# Patient Record
Sex: MALE | Race: WHITE | Employment: OTHER | URBAN - METROPOLITAN AREA
[De-identification: names, ages, dates, MRNs, and addresses within clinical notes are randomized per-mention and may not be internally consistent; named-entity substitution may affect disease eponyms.]

---

## 2017-01-05 RX ORDER — INSULIN LISPRO 100 [IU]/ML
INJECTION, SUSPENSION SUBCUTANEOUS
Qty: 5 PEN | Refills: 3 | Status: SHIPPED | OUTPATIENT
Start: 2017-01-05 | End: 2017-01-06 | Stop reason: SDUPTHER

## 2017-01-06 RX ORDER — INSULIN LISPRO 100 [IU]/ML
60 INJECTION, SUSPENSION SUBCUTANEOUS 2 TIMES DAILY WITH MEALS
Qty: 15 PEN | Refills: 3 | Status: SHIPPED | OUTPATIENT
Start: 2017-01-06 | End: 2017-03-08 | Stop reason: SDUPTHER

## 2017-03-08 RX ORDER — INSULIN LISPRO 100 [IU]/ML
INJECTION, SUSPENSION SUBCUTANEOUS
Qty: 5 PEN | Refills: 2 | Status: SHIPPED | OUTPATIENT
Start: 2017-03-08 | End: 2017-04-21 | Stop reason: SDUPTHER

## 2017-04-10 ENCOUNTER — OFFICE VISIT (OUTPATIENT)
Dept: CARDIOLOGY CLINIC | Age: 77
End: 2017-04-10

## 2017-04-10 VITALS
SYSTOLIC BLOOD PRESSURE: 134 MMHG | DIASTOLIC BLOOD PRESSURE: 60 MMHG | OXYGEN SATURATION: 95 % | RESPIRATION RATE: 16 BRPM | BODY MASS INDEX: 28.32 KG/M2 | WEIGHT: 197.8 LBS | HEART RATE: 66 BPM | HEIGHT: 70 IN

## 2017-04-10 DIAGNOSIS — E11.42 DM TYPE 2 WITH DIABETIC PERIPHERAL NEUROPATHY (HCC): ICD-10-CM

## 2017-04-10 DIAGNOSIS — E78.49 OTHER HYPERLIPIDEMIA: ICD-10-CM

## 2017-04-10 DIAGNOSIS — I25.10 CORONARY ARTERY DISEASE INVOLVING NATIVE HEART WITHOUT ANGINA PECTORIS, UNSPECIFIED VESSEL OR LESION TYPE: Primary | ICD-10-CM

## 2017-04-10 PROCEDURE — 1123F ACP DISCUSS/DSCN MKR DOCD: CPT | Performed by: INTERNAL MEDICINE

## 2017-04-10 PROCEDURE — G8598 ASA/ANTIPLAT THER USED: HCPCS | Performed by: INTERNAL MEDICINE

## 2017-04-10 PROCEDURE — 99214 OFFICE O/P EST MOD 30 MIN: CPT | Performed by: INTERNAL MEDICINE

## 2017-04-10 PROCEDURE — 4040F PNEUMOC VAC/ADMIN/RCVD: CPT | Performed by: INTERNAL MEDICINE

## 2017-04-10 PROCEDURE — G8427 DOCREV CUR MEDS BY ELIG CLIN: HCPCS | Performed by: INTERNAL MEDICINE

## 2017-04-10 PROCEDURE — 1036F TOBACCO NON-USER: CPT | Performed by: INTERNAL MEDICINE

## 2017-04-10 PROCEDURE — G8420 CALC BMI NORM PARAMETERS: HCPCS | Performed by: INTERNAL MEDICINE

## 2017-04-10 RX ORDER — ROSUVASTATIN CALCIUM 10 MG/1
10 TABLET, COATED ORAL DAILY
Qty: 90 TABLET | Refills: 3
Start: 2017-04-10 | End: 2017-06-19 | Stop reason: SDUPTHER

## 2017-04-24 RX ORDER — INSULIN LISPRO 100 [IU]/ML
INJECTION, SUSPENSION SUBCUTANEOUS
Qty: 15 PEN | Refills: 1 | Status: SHIPPED | OUTPATIENT
Start: 2017-04-24 | End: 2017-05-25 | Stop reason: SDUPTHER

## 2017-05-24 RX ORDER — LOSARTAN POTASSIUM 25 MG/1
TABLET ORAL
Qty: 90 TABLET | Refills: 3 | Status: SHIPPED | OUTPATIENT
Start: 2017-05-24 | End: 2018-04-16

## 2017-05-26 RX ORDER — INSULIN LISPRO 100 [IU]/ML
INJECTION, SUSPENSION SUBCUTANEOUS
Qty: 15 PEN | Refills: 1 | Status: SHIPPED | OUTPATIENT
Start: 2017-05-26 | End: 2017-06-26 | Stop reason: SDUPTHER

## 2017-06-12 ENCOUNTER — HOSPITAL ENCOUNTER (OUTPATIENT)
Dept: OTHER | Age: 77
Discharge: HOME | End: 2017-06-12
Attending: INTERNAL MEDICINE | Admitting: INTERNAL MEDICINE

## 2017-06-12 ENCOUNTER — HOSPITAL ENCOUNTER (OUTPATIENT)
Dept: OTHER | Age: 77
Discharge: HOME | End: 2017-06-12
Attending: FAMILY MEDICINE | Admitting: FAMILY MEDICINE

## 2017-06-12 LAB
A/G RATIO: 1.1 (ref 1.1–2.2)
ALBUMIN SERPL-MCNC: 4.2 G/DL (ref 3.4–5)
ALP BLD-CCNC: 47 U/L (ref 40–129)
ALT SERPL-CCNC: 20 U/L (ref 10–40)
ANION GAP SERPL CALCULATED.3IONS-SCNC: 16 MMOL/L (ref 3–16)
AST SERPL-CCNC: 29 U/L (ref 15–37)
BASOPHILS ABSOLUTE: 0 K/UL (ref 0–0.2)
BASOPHILS RELATIVE PERCENT: 0.3 %
BILIRUB SERPL-MCNC: 1 MG/DL (ref 0–1)
BUN BLDV-MCNC: 23 MG/DL (ref 7–20)
CALCIUM SERPL-MCNC: 9.5 MG/DL (ref 8.3–10.6)
CHLORIDE BLD-SCNC: 102 MMOL/L (ref 99–110)
CHOLESTEROL, TOTAL: 106 MG/DL (ref 0–199)
CO2: 26 MMOL/L (ref 21–32)
CREAT SERPL-MCNC: 1 MG/DL (ref 0.8–1.3)
CREATININE URINE: 77.3 MG/DL (ref 39–259)
EOSINOPHILS ABSOLUTE: 0.3 K/UL (ref 0–0.6)
EOSINOPHILS RELATIVE PERCENT: 4.6 %
ESTIMATED AVERAGE GLUCOSE: 139.9 MG/DL
GFR AFRICAN AMERICAN: >60
GFR NON-AFRICAN AMERICAN: >60
GLOBULIN: 3.9 G/DL
GLUCOSE BLD-MCNC: 87 MG/DL (ref 70–99)
HBA1C MFR BLD: 6.5 %
HCT VFR BLD CALC: 40.8 % (ref 40.5–52.5)
HDLC SERPL-MCNC: 50 MG/DL (ref 40–60)
HEMOGLOBIN: 13.7 G/DL (ref 13.5–17.5)
LDL CHOLESTEROL CALCULATED: 45 MG/DL
LYMPHOCYTES ABSOLUTE: 1.4 K/UL (ref 1–5.1)
LYMPHOCYTES RELATIVE PERCENT: 19.6 %
MCH RBC QN AUTO: 29.3 PG (ref 26–34)
MCHC RBC AUTO-ENTMCNC: 33.5 G/DL (ref 31–36)
MCV RBC AUTO: 87.4 FL (ref 80–100)
MICROALBUMIN UR-MCNC: 2.3 MG/DL
MICROALBUMIN/CREAT UR-RTO: 29.8 MG/G (ref 0–30)
MONOCYTES ABSOLUTE: 0.7 K/UL (ref 0–1.3)
MONOCYTES RELATIVE PERCENT: 9.4 %
NEUTROPHILS ABSOLUTE: 4.8 K/UL (ref 1.7–7.7)
NEUTROPHILS RELATIVE PERCENT: 66.1 %
PDW BLD-RTO: 15.5 % (ref 12.4–15.4)
PLATELET # BLD: 233 K/UL (ref 135–450)
PMV BLD AUTO: 7.8 FL (ref 5–10.5)
POTASSIUM SERPL-SCNC: 4.3 MMOL/L (ref 3.5–5.1)
PROSTATE SPECIFIC ANTIGEN: 4.9 NG/ML (ref 0–4)
RBC # BLD: 4.66 M/UL (ref 4.2–5.9)
SODIUM BLD-SCNC: 144 MMOL/L (ref 136–145)
TOTAL CK: 266 U/L (ref 39–308)
TOTAL PROTEIN: 8.1 G/DL (ref 6.4–8.2)
TRIGL SERPL-MCNC: 57 MG/DL (ref 0–150)
VLDLC SERPL CALC-MCNC: 11 MG/DL
WBC # BLD: 7.3 K/UL (ref 4–11)

## 2017-06-19 PROBLEM — R25.1 TREMOR OF LEFT HAND: Status: ACTIVE | Noted: 2017-06-19

## 2017-06-26 RX ORDER — INSULIN LISPRO 100 [IU]/ML
INJECTION, SUSPENSION SUBCUTANEOUS
Qty: 15 PEN | Refills: 2 | Status: SHIPPED | OUTPATIENT
Start: 2017-06-26 | End: 2017-08-17 | Stop reason: SDUPTHER

## 2017-08-18 RX ORDER — INSULIN LISPRO 100 [IU]/ML
INJECTION, SUSPENSION SUBCUTANEOUS
Qty: 15 PEN | Refills: 1 | Status: SHIPPED | OUTPATIENT
Start: 2017-08-18 | End: 2017-09-18 | Stop reason: SDUPTHER

## 2017-09-07 RX ORDER — CLOPIDOGREL BISULFATE 75 MG/1
TABLET ORAL
Qty: 90 TABLET | Refills: 2 | Status: SHIPPED | OUTPATIENT
Start: 2017-09-07 | End: 2018-06-10 | Stop reason: SDUPTHER

## 2017-09-18 RX ORDER — INSULIN LISPRO 100 [IU]/ML
INJECTION, SUSPENSION SUBCUTANEOUS
Qty: 15 PEN | Refills: 2 | Status: SHIPPED | OUTPATIENT
Start: 2017-09-18 | End: 2017-11-06 | Stop reason: SDUPTHER

## 2017-10-09 RX ORDER — METOPROLOL SUCCINATE 25 MG/1
TABLET, EXTENDED RELEASE ORAL
Qty: 30 TABLET | Refills: 6 | Status: SHIPPED | OUTPATIENT
Start: 2017-10-09 | End: 2018-05-03 | Stop reason: SDUPTHER

## 2017-10-10 ENCOUNTER — TELEPHONE (OUTPATIENT)
Dept: CARDIOLOGY CLINIC | Age: 77
End: 2017-10-10

## 2017-10-10 ENCOUNTER — OFFICE VISIT (OUTPATIENT)
Dept: CARDIOLOGY CLINIC | Age: 77
End: 2017-10-10

## 2017-10-10 VITALS
BODY MASS INDEX: 28.77 KG/M2 | WEIGHT: 201 LBS | SYSTOLIC BLOOD PRESSURE: 148 MMHG | HEART RATE: 65 BPM | DIASTOLIC BLOOD PRESSURE: 70 MMHG | HEIGHT: 70 IN

## 2017-10-10 DIAGNOSIS — E78.00 PURE HYPERCHOLESTEROLEMIA: ICD-10-CM

## 2017-10-10 DIAGNOSIS — I25.10 CORONARY ARTERY DISEASE INVOLVING NATIVE CORONARY ARTERY OF NATIVE HEART WITHOUT ANGINA PECTORIS: Primary | ICD-10-CM

## 2017-10-10 DIAGNOSIS — I10 ESSENTIAL HYPERTENSION: ICD-10-CM

## 2017-10-10 PROCEDURE — G8427 DOCREV CUR MEDS BY ELIG CLIN: HCPCS | Performed by: NURSE PRACTITIONER

## 2017-10-10 PROCEDURE — G8417 CALC BMI ABV UP PARAM F/U: HCPCS | Performed by: NURSE PRACTITIONER

## 2017-10-10 PROCEDURE — 1036F TOBACCO NON-USER: CPT | Performed by: NURSE PRACTITIONER

## 2017-10-10 PROCEDURE — G8598 ASA/ANTIPLAT THER USED: HCPCS | Performed by: NURSE PRACTITIONER

## 2017-10-10 PROCEDURE — 93000 ELECTROCARDIOGRAM COMPLETE: CPT | Performed by: NURSE PRACTITIONER

## 2017-10-10 PROCEDURE — G8484 FLU IMMUNIZE NO ADMIN: HCPCS | Performed by: NURSE PRACTITIONER

## 2017-10-10 PROCEDURE — 99214 OFFICE O/P EST MOD 30 MIN: CPT | Performed by: NURSE PRACTITIONER

## 2017-10-10 PROCEDURE — 4040F PNEUMOC VAC/ADMIN/RCVD: CPT | Performed by: NURSE PRACTITIONER

## 2017-10-10 PROCEDURE — 1123F ACP DISCUSS/DSCN MKR DOCD: CPT | Performed by: NURSE PRACTITIONER

## 2017-10-10 RX ORDER — ROSUVASTATIN CALCIUM 20 MG/1
20 TABLET, COATED ORAL DAILY
COMMUNITY
End: 2018-06-19 | Stop reason: SDUPTHER

## 2017-10-10 RX ORDER — COVID-19 ANTIGEN TEST
1 KIT MISCELLANEOUS 2 TIMES DAILY PRN
COMMUNITY

## 2017-10-10 NOTE — PROGRESS NOTES
[address]     Outpatient Follow Up Note    Amisha Saxena is 65 y.o. male who presents today with a history of CAD s/p PTCA LAD '14, HTN and hyperlipidemia. His other hx includes rhabdomyolysis on lipitor. CHIEF COMPLAINT / HPI:  Follow Up secondary to coronary artery disease    Subjective:   He denies significant chest pain. There is no SOB/HOLLEY. The patient denies orthopnea/PND. The patient does not have swelling. The patients weight is stable . The patient is not experiencing palpitations or dizziness. These symptoms show no change since the last OV. With regard to medication therapy the patient has been compliant with prescribed regimen. They have not tolerated therapy to date.      Past Medical History:   Diagnosis Date    Arthropathy, unspecified, site unspecified     Hypertension     Hypertrophy of prostate without urinary obstruction and other lower urinary tract symptoms (LUTS)     Impotence of organic origin     Inguinal hernia     Myalgia and myositis, unspecified     Other and unspecified hyperlipidemia     Retinal microaneurysms NOS     Type II or unspecified type diabetes mellitus without mention of complication, not stated as uncontrolled      Social History:    History   Smoking Status    Never Smoker   Smokeless Tobacco    Never Used     Current Medications:  Current Outpatient Prescriptions   Medication Sig Dispense Refill    Naproxen Sodium (ALEVE) 220 MG CAPS Take 1 tablet by mouth 2 times daily as needed for Pain      rosuvastatin (CRESTOR) 20 MG tablet Take 20 mg by mouth daily      metoprolol succinate (TOPROL XL) 25 MG extended release tablet TAKE ONE TABLET BY MOUTH DAILY 30 tablet 6    HUMALOG MIX 75/25 KWIKPEN (75-25) 100 UNIT/ML SUPN injection pen INJECT 60 UNITS UNDER THE SKIN TWO TIMES A DAY WITH MEALS 15 Pen 2    clopidogrel (PLAVIX) 75 MG tablet TAKE ONE TABLET BY MOUTH DAILY 90 tablet 2    losartan (COZAAR) 25 MG tablet TAKE ONE TABLET BY MOUTH DAILY 90 tablet 3    metFORMIN (GLUCOPHAGE) 500 MG tablet TAKE ONE TABLET BY MOUTH TWICE A DAY WITH MEALS 180 tablet 1    hydrochlorothiazide (HYDRODIURIL) 50 MG tablet TAKE ONE TABLET BY MOUTH DAILY 90 tablet 2    Cholecalciferol (VITAMIN D3) 38803 UNITS CAPS Take one every two weeks. 6 capsule 5    nitroGLYCERIN (NITROSTAT) 0.4 MG SL tablet Place 1 tablet under the tongue every 5 minutes as needed for Chest pain. 25 tablet 1    aspirin 81 MG EC tablet Take 81 mg by mouth daily.  Insulin Pen Needle (NOVOFINE) 30G X 8 MM MISC Apply 1 each topically 2 times daily 100 each 3    ROM MICROLET LANCETS MISC TEST BLOOD SUGAR THREE TIMES A DAY AS NEEDED 100 each 11    glucose blood VI test strips (FREESTYLE TEST STRIPS) strip Apply 1 each topically 3 times daily Dx- E11.9 100 strip 11    L-Methylfolate-B6-B12 (FOLTANX) 3-35-2 MG TABS Take 1 tablet by mouth 2 times daily 180 tablet 3     No current facility-administered medications for this visit. REVIEW OF SYSTEMS:    CONSTITUTIONAL: No major weight gain or loss, fatigue, weakness, night sweats or fever. HEENT: No new vision difficulties or ringing in the ears. RESPIRATORY: No new SOB, PND, orthopnea or cough. CARDIOVASCULAR: See HPI  GI: No nausea, vomiting, diarrhea, constipation, abdominal pain or changes in bowel habits. : No urinary frequency, urgency, incontinence hematuria or dysuria. SKIN: No cyanosis or skin lesions. MUSCULOSKELETAL: No new muscle or joint pain. NEUROLOGICAL: No syncope or TIA-like symptoms.   PSYCHIATRIC: No anxiety, pain, insomnia or depression    Objective:   PHYSICAL EXAM:        Vitals:    10/10/17 0944 10/10/17 0952 10/10/17 1007   BP: (!) 160/90 (!) 170/90 (!) 148/70   Site:   Left Arm   Cuff Size: Medium Adult  Medium Adult   Pulse: 65     Weight: 201 lb (91.2 kg)     Height: 5' 10\" (1.778 m)         VITALS:  BP (!) 170/90   Ht 5' 10\" (1.778 m)   Wt 201 lb (91.2 kg)   BMI 28.84 kg/m²   CONSTITUTIONAL: Cooperative, no apparent distress, and appears well nourished / developed  NEUROLOGIC:  Awake and orientated to person, place and time. PSYCH: Calm affect. SKIN: Warm and dry. HEENT: Sclera non-icteric, normocephalic, neck supple, no elevation of JVP, normal carotid pulses with no bruits and thyroid normal size. LUNGS:  No increased work of breathing and clear to auscultation, no crackles or wheezing  CARDIOVASCULAR:  Regular rate 68 and rhythm with no murmurs, gallops, rubs, or abnormal heart sounds, normal PMI. The apical impulses not displaced  JVP less than 8 cm H2O  Heart tones are crisp and normal  Cervical veins are not engorged  The carotid upstroke is normal in amplitude and contour without delay or bruit  JVP is not elevated  ABDOMEN:  Normal bowel sounds, non-distended and non-tender to palpation  EXT: No edema, no calf tenderness. Pulses are present bilaterally.     DATA:    Lab Results   Component Value Date    ALT 20 06/12/2017    AST 29 06/12/2017    ALKPHOS 47 06/12/2017    BILITOT 1.0 06/12/2017     Lab Results   Component Value Date    CREATININE 1.0 06/12/2017    BUN 23 (H) 06/12/2017     06/12/2017    K 4.3 06/12/2017     06/12/2017    CO2 26 06/12/2017     Lab Results   Component Value Date    WBC 7.3 06/12/2017    HGB 13.7 06/12/2017    HCT 40.8 06/12/2017    MCV 87.4 06/12/2017     06/12/2017     No components found for: CHLPL  Lab Results   Component Value Date    TRIG 57 06/12/2017    TRIG 61 12/12/2016    TRIG 97 08/23/2016     Lab Results   Component Value Date    HDL 50 06/12/2017    HDL 47 12/12/2016    HDL 48 08/23/2016     Lab Results   Component Value Date    LDLCALC 45 06/12/2017    LDLCALC 25 12/12/2016    LDLCALC 22 08/23/2016     Lab Results   Component Value Date    LABVLDL 11 06/12/2017    LABVLDL 12 12/12/2016    LABVLDL 35 12/03/2015     Radiology Review:  Pertinent images / reports were reviewed as a part of this visit and reveals the following: Angiogram: 12/26/13:  Cath with 40% proximal RCA  50% PDA  99% prox LAD  EF 60%  PCI with 3.5 x 15 expedition with excellent result,small diagonal occluded after case without symptoms      Last Stress Test: Aug '16:  Summary    Small-moderate sized inferolateral completely reversible defect consistent    with ischemia in the territory of the distal LCx .    Normal LV size and systolic function.    No change from prior study in 2014.             Assessment:     1. Coronary artery disease involving native coronary artery of native heart without angina pectoris   ~stable : s/p PTCA LAD '13  ~DAPT / BB  ~exercise : marginal. Verbalizes need to increase amt of time/freq using the nu-step  ~nucl stress: reversible defect noted '16 and consider stable  EKG 12 Lead   2. Pure hypercholesterolemia   ~tolerating current dose of crestor (20mg reported; ? 10 mg as planned at last appt; was also seeing endocrinology)  ~LDL improved from 25 > 45    3. Essential hypertension   ~suboptimal today in office  ~states to run ~ 130-140/60 at home  ~low dose losartan / HCTZ / metoprolol           I had the opportunity to review the clinical symptoms and presentation of Misty Castañeda. Plan:     1. EKG: sinus rhythm   2. He will check his BP later today and call back with his reading   ~he will also check once weekly and call after 4 weeks prior to making med changes. May need to consider increasing losartan  3. F/U in 6 months / BP dependent     Overall the patient is stable from CV standpoint    I have addresed the patient's cardiac risk factors and adjusted pharmacologic treatment as needed. In addition, I have reinforced the need for patient directed risk factor modification. Further evaluation will be based upon the patient's clinical course and testing results. All questions and concerns were addressed to the patient. Alternatives to my treatment were discussed. The patient is not currently smoking.  The risks related to

## 2017-10-10 NOTE — PATIENT INSTRUCTIONS
Check your BP later this evening and call us with tomorrow with your reading    Check your BP weekly and call me after four weeks with your numbers    appt in six months with Dr. Francis Watson

## 2017-11-06 RX ORDER — INSULIN LISPRO 100 [IU]/ML
INJECTION, SUSPENSION SUBCUTANEOUS
Qty: 15 PEN | Refills: 0 | Status: SHIPPED | OUTPATIENT
Start: 2017-11-06 | End: 2017-11-21 | Stop reason: SDUPTHER

## 2017-11-22 RX ORDER — INSULIN LISPRO 100 [IU]/ML
INJECTION, SUSPENSION SUBCUTANEOUS
Qty: 15 PEN | Refills: 1 | Status: SHIPPED | OUTPATIENT
Start: 2017-11-22 | End: 2018-06-01 | Stop reason: SDUPTHER

## 2017-11-24 ENCOUNTER — TELEPHONE (OUTPATIENT)
Dept: FAMILY MEDICINE CLINIC | Age: 77
End: 2017-11-24

## 2017-12-11 ENCOUNTER — HOSPITAL ENCOUNTER (OUTPATIENT)
Dept: OTHER | Age: 77
Discharge: HOME | End: 2017-12-11
Attending: FAMILY MEDICINE | Admitting: FAMILY MEDICINE

## 2017-12-11 LAB
A/G RATIO: 1.2 (ref 1.1–2.2)
ALBUMIN SERPL-MCNC: 4.3 G/DL (ref 3.4–5)
ALP BLD-CCNC: 58 U/L (ref 40–129)
ALT SERPL-CCNC: 13 U/L (ref 10–40)
ANION GAP SERPL CALCULATED.3IONS-SCNC: 13 MMOL/L (ref 3–16)
AST SERPL-CCNC: 23 U/L (ref 15–37)
BASOPHILS ABSOLUTE: 0.1 K/UL (ref 0–0.2)
BASOPHILS RELATIVE PERCENT: 1.1 %
BILIRUB SERPL-MCNC: 1.1 MG/DL (ref 0–1)
BUN BLDV-MCNC: 26 MG/DL (ref 7–20)
CALCIUM SERPL-MCNC: 9.7 MG/DL (ref 8.3–10.6)
CHLORIDE BLD-SCNC: 99 MMOL/L (ref 99–110)
CHOLESTEROL, TOTAL: 116 MG/DL (ref 0–199)
CO2: 27 MMOL/L (ref 21–32)
CREAT SERPL-MCNC: 1.2 MG/DL (ref 0.8–1.3)
EOSINOPHILS ABSOLUTE: 0.5 K/UL (ref 0–0.6)
EOSINOPHILS RELATIVE PERCENT: 6.4 %
ESTIMATED AVERAGE GLUCOSE: 142.7 MG/DL
GFR AFRICAN AMERICAN: >60
GFR NON-AFRICAN AMERICAN: 59
GLOBULIN: 3.7 G/DL
GLUCOSE BLD-MCNC: 131 MG/DL (ref 70–99)
HBA1C MFR BLD: 6.6 %
HCT VFR BLD CALC: 43.1 % (ref 40.5–52.5)
HDLC SERPL-MCNC: 50 MG/DL (ref 40–60)
HEMOGLOBIN: 14.4 G/DL (ref 13.5–17.5)
LDL CHOLESTEROL CALCULATED: 47 MG/DL
LYMPHOCYTES ABSOLUTE: 1.5 K/UL (ref 1–5.1)
LYMPHOCYTES RELATIVE PERCENT: 20.8 %
MCH RBC QN AUTO: 30 PG (ref 26–34)
MCHC RBC AUTO-ENTMCNC: 33.4 G/DL (ref 31–36)
MCV RBC AUTO: 89.7 FL (ref 80–100)
MONOCYTES ABSOLUTE: 0.8 K/UL (ref 0–1.3)
MONOCYTES RELATIVE PERCENT: 10.4 %
NEUTROPHILS ABSOLUTE: 4.5 K/UL (ref 1.7–7.7)
NEUTROPHILS RELATIVE PERCENT: 61.3 %
PDW BLD-RTO: 14.8 % (ref 12.4–15.4)
PLATELET # BLD: 252 K/UL (ref 135–450)
PMV BLD AUTO: 8 FL (ref 5–10.5)
POTASSIUM SERPL-SCNC: 4.7 MMOL/L (ref 3.5–5.1)
RBC # BLD: 4.8 M/UL (ref 4.2–5.9)
SODIUM BLD-SCNC: 139 MMOL/L (ref 136–145)
TOTAL CK: 245 U/L (ref 39–308)
TOTAL PROTEIN: 8 G/DL (ref 6.4–8.2)
TRIGL SERPL-MCNC: 95 MG/DL (ref 0–150)
VITAMIN D 25-HYDROXY: 43.5 NG/ML
VLDLC SERPL CALC-MCNC: 19 MG/DL
WBC # BLD: 7.3 K/UL (ref 4–11)

## 2018-02-07 RX ORDER — BLOOD SUGAR DIAGNOSTIC
STRIP MISCELLANEOUS
Qty: 100 STRIP | Refills: 10 | Status: SHIPPED | OUTPATIENT
Start: 2018-02-07 | End: 2019-02-13 | Stop reason: SDUPTHER

## 2018-04-16 ENCOUNTER — OFFICE VISIT (OUTPATIENT)
Dept: CARDIOLOGY CLINIC | Age: 78
End: 2018-04-16

## 2018-04-16 VITALS
OXYGEN SATURATION: 95 % | WEIGHT: 200 LBS | BODY MASS INDEX: 28.63 KG/M2 | HEART RATE: 69 BPM | SYSTOLIC BLOOD PRESSURE: 138 MMHG | DIASTOLIC BLOOD PRESSURE: 76 MMHG | HEIGHT: 70 IN

## 2018-04-16 DIAGNOSIS — I25.10 CORONARY ARTERY DISEASE INVOLVING NATIVE CORONARY ARTERY OF NATIVE HEART WITHOUT ANGINA PECTORIS: Primary | ICD-10-CM

## 2018-04-16 DIAGNOSIS — E78.49 OTHER HYPERLIPIDEMIA: ICD-10-CM

## 2018-04-16 DIAGNOSIS — I10 ESSENTIAL HYPERTENSION: ICD-10-CM

## 2018-04-16 PROCEDURE — 99214 OFFICE O/P EST MOD 30 MIN: CPT | Performed by: INTERNAL MEDICINE

## 2018-04-16 PROCEDURE — G8598 ASA/ANTIPLAT THER USED: HCPCS | Performed by: INTERNAL MEDICINE

## 2018-04-16 PROCEDURE — G8427 DOCREV CUR MEDS BY ELIG CLIN: HCPCS | Performed by: INTERNAL MEDICINE

## 2018-04-16 PROCEDURE — 1036F TOBACCO NON-USER: CPT | Performed by: INTERNAL MEDICINE

## 2018-04-16 PROCEDURE — 1123F ACP DISCUSS/DSCN MKR DOCD: CPT | Performed by: INTERNAL MEDICINE

## 2018-04-16 PROCEDURE — 4040F PNEUMOC VAC/ADMIN/RCVD: CPT | Performed by: INTERNAL MEDICINE

## 2018-04-16 PROCEDURE — G8417 CALC BMI ABV UP PARAM F/U: HCPCS | Performed by: INTERNAL MEDICINE

## 2018-04-16 RX ORDER — LOSARTAN POTASSIUM 25 MG/1
25 TABLET ORAL DAILY
COMMUNITY
End: 2018-06-19

## 2018-05-04 RX ORDER — METOPROLOL SUCCINATE 25 MG/1
TABLET, EXTENDED RELEASE ORAL
Qty: 30 TABLET | Refills: 11 | Status: SHIPPED | OUTPATIENT
Start: 2018-05-04 | End: 2019-05-05 | Stop reason: SDUPTHER

## 2018-06-01 RX ORDER — INSULIN LISPRO 100 [IU]/ML
INJECTION, SUSPENSION SUBCUTANEOUS
Qty: 2 PEN | Refills: 0 | Status: SHIPPED | OUTPATIENT
Start: 2018-06-01 | End: 2018-07-02 | Stop reason: SDUPTHER

## 2018-06-11 ENCOUNTER — TELEPHONE (OUTPATIENT)
Dept: FAMILY MEDICINE CLINIC | Age: 78
End: 2018-06-11

## 2018-06-11 DIAGNOSIS — E11.42 DM TYPE 2 WITH DIABETIC PERIPHERAL NEUROPATHY (HCC): Primary | ICD-10-CM

## 2018-06-11 DIAGNOSIS — I10 ESSENTIAL HYPERTENSION: ICD-10-CM

## 2018-06-11 DIAGNOSIS — R97.20 ELEVATED PSA: ICD-10-CM

## 2018-06-11 DIAGNOSIS — E78.49 OTHER HYPERLIPIDEMIA: ICD-10-CM

## 2018-06-11 RX ORDER — CLOPIDOGREL BISULFATE 75 MG/1
TABLET ORAL
Qty: 90 TABLET | Refills: 3 | Status: SHIPPED | OUTPATIENT
Start: 2018-06-11 | End: 2019-06-08 | Stop reason: SDUPTHER

## 2018-06-12 DIAGNOSIS — R79.89 LOW VITAMIN D LEVEL: ICD-10-CM

## 2018-06-12 DIAGNOSIS — I25.10 CORONARY ARTERY DISEASE INVOLVING NATIVE CORONARY ARTERY OF NATIVE HEART WITHOUT ANGINA PECTORIS: Primary | ICD-10-CM

## 2018-06-12 RX ORDER — LOSARTAN POTASSIUM 25 MG/1
TABLET ORAL
Qty: 90 TABLET | Refills: 2 | Status: SHIPPED | OUTPATIENT
Start: 2018-06-12 | End: 2018-07-27 | Stop reason: SDUPTHER

## 2018-06-13 ENCOUNTER — HOSPITAL ENCOUNTER (OUTPATIENT)
Dept: OTHER | Age: 78
Discharge: HOME | End: 2018-06-13
Attending: FAMILY MEDICINE | Admitting: FAMILY MEDICINE

## 2018-06-13 DIAGNOSIS — R97.20 ELEVATED PSA: ICD-10-CM

## 2018-06-13 DIAGNOSIS — E11.42 DM TYPE 2 WITH DIABETIC PERIPHERAL NEUROPATHY (HCC): ICD-10-CM

## 2018-06-13 DIAGNOSIS — I25.10 CORONARY ARTERY DISEASE INVOLVING NATIVE CORONARY ARTERY OF NATIVE HEART WITHOUT ANGINA PECTORIS: ICD-10-CM

## 2018-06-13 DIAGNOSIS — E78.49 OTHER HYPERLIPIDEMIA: ICD-10-CM

## 2018-06-13 DIAGNOSIS — R79.89 LOW VITAMIN D LEVEL: ICD-10-CM

## 2018-06-13 LAB
A/G RATIO: 1.2 (ref 1.1–2.2)
ALBUMIN SERPL-MCNC: 4.5 G/DL (ref 3.4–5)
ALP BLD-CCNC: 63 U/L (ref 40–129)
ALT SERPL-CCNC: 15 U/L (ref 10–40)
ANION GAP SERPL CALCULATED.3IONS-SCNC: 15 MMOL/L (ref 3–16)
AST SERPL-CCNC: 25 U/L (ref 15–37)
BASOPHILS ABSOLUTE: 0.1 K/UL (ref 0–0.2)
BASOPHILS RELATIVE PERCENT: 1.5 %
BILIRUB SERPL-MCNC: 1.1 MG/DL (ref 0–1)
BUN BLDV-MCNC: 25 MG/DL (ref 7–20)
CALCIUM SERPL-MCNC: 10 MG/DL (ref 8.3–10.6)
CHLORIDE BLD-SCNC: 99 MMOL/L (ref 99–110)
CHOLESTEROL, TOTAL: 109 MG/DL (ref 0–199)
CO2: 25 MMOL/L (ref 21–32)
CREAT SERPL-MCNC: 1.1 MG/DL (ref 0.8–1.3)
CREATININE URINE: 48.2 MG/DL (ref 39–259)
EOSINOPHILS ABSOLUTE: 0.5 K/UL (ref 0–0.6)
EOSINOPHILS RELATIVE PERCENT: 6.7 %
ESTIMATED AVERAGE GLUCOSE: 145.6 MG/DL
GFR AFRICAN AMERICAN: >60
GFR NON-AFRICAN AMERICAN: >60
GLOBULIN: 3.7 G/DL
GLUCOSE BLD-MCNC: 137 MG/DL (ref 70–99)
HBA1C MFR BLD: 6.7 %
HCT VFR BLD CALC: 41.7 % (ref 40.5–52.5)
HDLC SERPL-MCNC: 50 MG/DL (ref 40–60)
HEMOGLOBIN: 14.5 G/DL (ref 13.5–17.5)
LDL CHOLESTEROL CALCULATED: 39 MG/DL
LYMPHOCYTES ABSOLUTE: 1.7 K/UL (ref 1–5.1)
LYMPHOCYTES RELATIVE PERCENT: 21.6 %
MCH RBC QN AUTO: 30.2 PG (ref 26–34)
MCHC RBC AUTO-ENTMCNC: 34.7 G/DL (ref 31–36)
MCV RBC AUTO: 87 FL (ref 80–100)
MICROALBUMIN UR-MCNC: 2.3 MG/DL
MICROALBUMIN/CREAT UR-RTO: 47.7 MG/G (ref 0–30)
MONOCYTES ABSOLUTE: 0.8 K/UL (ref 0–1.3)
MONOCYTES RELATIVE PERCENT: 10.1 %
NEUTROPHILS ABSOLUTE: 4.8 K/UL (ref 1.7–7.7)
NEUTROPHILS RELATIVE PERCENT: 60.1 %
PDW BLD-RTO: 15 % (ref 12.4–15.4)
PLATELET # BLD: 268 K/UL (ref 135–450)
PMV BLD AUTO: 7.9 FL (ref 5–10.5)
POTASSIUM SERPL-SCNC: 4.3 MMOL/L (ref 3.5–5.1)
PROSTATE SPECIFIC ANTIGEN: 5.92 NG/ML (ref 0–4)
RBC # BLD: 4.79 M/UL (ref 4.2–5.9)
SODIUM BLD-SCNC: 139 MMOL/L (ref 136–145)
TOTAL CK: 347 U/L (ref 39–308)
TOTAL PROTEIN: 8.2 G/DL (ref 6.4–8.2)
TRIGL SERPL-MCNC: 99 MG/DL (ref 0–150)
TSH SERPL DL<=0.05 MIU/L-ACNC: 7.24 UIU/ML (ref 0.27–4.2)
VITAMIN D 25-HYDROXY: 47.2 NG/ML
VLDLC SERPL CALC-MCNC: 20 MG/DL
WBC # BLD: 7.9 K/UL (ref 4–11)

## 2018-06-19 ENCOUNTER — OFFICE VISIT (OUTPATIENT)
Dept: FAMILY MEDICINE CLINIC | Age: 78
End: 2018-06-19

## 2018-06-19 VITALS
DIASTOLIC BLOOD PRESSURE: 60 MMHG | HEART RATE: 64 BPM | SYSTOLIC BLOOD PRESSURE: 158 MMHG | WEIGHT: 202 LBS | OXYGEN SATURATION: 96 % | BODY MASS INDEX: 28.98 KG/M2

## 2018-06-19 DIAGNOSIS — R74.8 ELEVATED CK: ICD-10-CM

## 2018-06-19 DIAGNOSIS — E03.9 HYPOTHYROIDISM, UNSPECIFIED TYPE: ICD-10-CM

## 2018-06-19 DIAGNOSIS — Z79.4 TYPE 2 DIABETES MELLITUS WITH DIABETIC NEUROPATHY, WITH LONG-TERM CURRENT USE OF INSULIN (HCC): Primary | ICD-10-CM

## 2018-06-19 DIAGNOSIS — I25.10 CORONARY ARTERY DISEASE INVOLVING NATIVE CORONARY ARTERY OF NATIVE HEART WITHOUT ANGINA PECTORIS: ICD-10-CM

## 2018-06-19 DIAGNOSIS — Z12.11 SCREENING FOR COLON CANCER: ICD-10-CM

## 2018-06-19 DIAGNOSIS — Z85.828 HISTORY OF SKIN CANCER: ICD-10-CM

## 2018-06-19 DIAGNOSIS — E78.49 OTHER HYPERLIPIDEMIA: ICD-10-CM

## 2018-06-19 DIAGNOSIS — E11.40 TYPE 2 DIABETES MELLITUS WITH DIABETIC NEUROPATHY, WITH LONG-TERM CURRENT USE OF INSULIN (HCC): Primary | ICD-10-CM

## 2018-06-19 DIAGNOSIS — I10 ESSENTIAL HYPERTENSION: ICD-10-CM

## 2018-06-19 DIAGNOSIS — M25.512 LEFT SHOULDER PAIN, UNSPECIFIED CHRONICITY: ICD-10-CM

## 2018-06-19 PROCEDURE — 99214 OFFICE O/P EST MOD 30 MIN: CPT | Performed by: FAMILY MEDICINE

## 2018-06-19 PROCEDURE — G8427 DOCREV CUR MEDS BY ELIG CLIN: HCPCS | Performed by: FAMILY MEDICINE

## 2018-06-19 PROCEDURE — 1036F TOBACCO NON-USER: CPT | Performed by: FAMILY MEDICINE

## 2018-06-19 PROCEDURE — G8598 ASA/ANTIPLAT THER USED: HCPCS | Performed by: FAMILY MEDICINE

## 2018-06-19 PROCEDURE — G8417 CALC BMI ABV UP PARAM F/U: HCPCS | Performed by: FAMILY MEDICINE

## 2018-06-19 PROCEDURE — 4040F PNEUMOC VAC/ADMIN/RCVD: CPT | Performed by: FAMILY MEDICINE

## 2018-06-19 PROCEDURE — 1123F ACP DISCUSS/DSCN MKR DOCD: CPT | Performed by: FAMILY MEDICINE

## 2018-06-19 RX ORDER — LEVOTHYROXINE SODIUM 0.03 MG/1
25 TABLET ORAL DAILY
Qty: 30 TABLET | Refills: 2 | Status: SHIPPED | OUTPATIENT
Start: 2018-06-19 | End: 2018-09-13 | Stop reason: SDUPTHER

## 2018-06-19 RX ORDER — ROSUVASTATIN CALCIUM 20 MG/1
20 TABLET, COATED ORAL DAILY
Qty: 90 TABLET | Refills: 1 | Status: SHIPPED | OUTPATIENT
Start: 2018-06-19 | End: 2018-06-20 | Stop reason: DRUGHIGH

## 2018-06-19 RX ORDER — PREDNISONE 20 MG/1
TABLET ORAL
Qty: 19 TABLET | Refills: 0 | Status: SHIPPED | OUTPATIENT
Start: 2018-06-19 | End: 2018-10-22 | Stop reason: ALTCHOICE

## 2018-06-19 ASSESSMENT — PATIENT HEALTH QUESTIONNAIRE - PHQ9
2. FEELING DOWN, DEPRESSED OR HOPELESS: 0
SUM OF ALL RESPONSES TO PHQ9 QUESTIONS 1 & 2: 0
SUM OF ALL RESPONSES TO PHQ QUESTIONS 1-9: 0
1. LITTLE INTEREST OR PLEASURE IN DOING THINGS: 0

## 2018-06-19 ASSESSMENT — ENCOUNTER SYMPTOMS
SHORTNESS OF BREATH: 0
COUGH: 1

## 2018-06-20 RX ORDER — ROSUVASTATIN CALCIUM 5 MG/1
5 TABLET, COATED ORAL DAILY
Qty: 90 TABLET | Refills: 1 | Status: SHIPPED | OUTPATIENT
Start: 2018-06-20 | End: 2018-12-17 | Stop reason: SDUPTHER

## 2018-06-20 ASSESSMENT — ENCOUNTER SYMPTOMS
ABDOMINAL PAIN: 0
CONSTIPATION: 0
DIARRHEA: 0

## 2018-06-29 ENCOUNTER — NURSE ONLY (OUTPATIENT)
Dept: FAMILY MEDICINE CLINIC | Age: 78
End: 2018-06-29

## 2018-06-29 DIAGNOSIS — Z12.11 SCREENING FOR COLON CANCER: ICD-10-CM

## 2018-06-29 LAB
CONTROL: NORMAL
HEMOCCULT STL QL: NEGATIVE

## 2018-06-29 PROCEDURE — 82274 ASSAY TEST FOR BLOOD FECAL: CPT | Performed by: FAMILY MEDICINE

## 2018-07-02 ENCOUNTER — TELEPHONE (OUTPATIENT)
Dept: FAMILY MEDICINE CLINIC | Age: 78
End: 2018-07-02

## 2018-07-02 RX ORDER — INSULIN LISPRO 100 [IU]/ML
INJECTION, SUSPENSION SUBCUTANEOUS
Qty: 11 PEN | Refills: 0 | Status: SHIPPED | OUTPATIENT
Start: 2018-07-02 | End: 2018-08-04 | Stop reason: SDUPTHER

## 2018-07-03 NOTE — TELEPHONE ENCOUNTER
Can check with patient what he prefers. Looks like it has been 11-15 pens for last few rx. Not sure if he has been getting differently per pharmacy or what he prefers. If consistently using 60 units BID we can also just write for 1 month or 3 month supply with that dosing.

## 2018-07-20 ENCOUNTER — HOSPITAL ENCOUNTER (OUTPATIENT)
Dept: OTHER | Age: 78
Discharge: HOME | End: 2018-07-20
Attending: FAMILY MEDICINE | Admitting: FAMILY MEDICINE

## 2018-07-20 DIAGNOSIS — E03.9 HYPOTHYROIDISM, UNSPECIFIED TYPE: ICD-10-CM

## 2018-07-20 DIAGNOSIS — R74.8 ELEVATED CK: ICD-10-CM

## 2018-07-20 LAB
TOTAL CK: 210 U/L (ref 39–308)
TSH SERPL DL<=0.05 MIU/L-ACNC: 4.82 UIU/ML (ref 0.27–4.2)

## 2018-07-23 DIAGNOSIS — E03.9 HYPOTHYROIDISM, UNSPECIFIED TYPE: Primary | ICD-10-CM

## 2018-07-27 ENCOUNTER — TELEPHONE (OUTPATIENT)
Dept: FAMILY MEDICINE CLINIC | Age: 78
End: 2018-07-27

## 2018-07-27 RX ORDER — LOSARTAN POTASSIUM 100 MG/1
TABLET ORAL
Qty: 30 TABLET | Refills: 1 | Status: SHIPPED | OUTPATIENT
Start: 2018-07-27 | End: 2018-09-26 | Stop reason: SDUPTHER

## 2018-08-06 RX ORDER — INSULIN LISPRO 100 [IU]/ML
INJECTION, SUSPENSION SUBCUTANEOUS
Qty: 11 PEN | Refills: 1 | Status: SHIPPED | OUTPATIENT
Start: 2018-08-06 | End: 2018-10-22 | Stop reason: SDUPTHER

## 2018-09-04 DIAGNOSIS — E11.42 DM TYPE 2 WITH DIABETIC PERIPHERAL NEUROPATHY (HCC): Primary | ICD-10-CM

## 2018-09-04 DIAGNOSIS — E78.49 OTHER HYPERLIPIDEMIA: ICD-10-CM

## 2018-09-04 DIAGNOSIS — R74.8 ELEVATED CK: ICD-10-CM

## 2018-09-13 DIAGNOSIS — E03.9 HYPOTHYROIDISM, UNSPECIFIED TYPE: ICD-10-CM

## 2018-09-14 RX ORDER — LEVOTHYROXINE SODIUM 0.03 MG/1
TABLET ORAL
Qty: 30 TABLET | Refills: 1 | Status: SHIPPED | OUTPATIENT
Start: 2018-09-14 | End: 2018-11-12 | Stop reason: SDUPTHER

## 2018-09-27 RX ORDER — LOSARTAN POTASSIUM 100 MG/1
TABLET ORAL
Qty: 30 TABLET | Refills: 0 | Status: SHIPPED | OUTPATIENT
Start: 2018-09-27 | End: 2018-10-25 | Stop reason: SDUPTHER

## 2018-10-20 NOTE — PROGRESS NOTES
[address]   Cardiac Follow up    Referring Provider:  Meseret Kelly MD     Chief Complaint   Patient presents with    6 Month Follow-Up    Coronary Artery Disease    Hypertension    Hyperlipidemia      History of Present Illness:  Mr. Evelin Junior is seen today as a regular follow up to known CAD, hypertension, and hyperlipidemia. Prior to stenting in December 2013, he was short of breath while snow blowing. Previously suffered from rhabdomyolysis on Lipitor (had taken for 15 years prior without problem). Last stress test was stable. He is a retired [employer] . Today, he reports he feels fairly well overall but has noted LE edema recently. He denies exertional chest pain, HOLLEY/PND, palpitations, light-headedness. He has mild lower arm tremors noted. He stays busy, does some consulting work, does not smoke. Past Medical History:   has a past medical history of Arthropathy, unspecified, site unspecified; Hypertension; Hypertrophy of prostate without urinary obstruction and other lower urinary tract symptoms (LUTS); Impotence of organic origin; Inguinal hernia; Myalgia and myositis, unspecified; Other and unspecified hyperlipidemia; Retinal microaneurysms NOS; and Type II or unspecified type diabetes mellitus without mention of complication, not stated as uncontrolled. Surgical History:   has a past surgical history that includes Lumbar disc surgery (1998); Cataract removal; Inguinal hernia repair (Left); Coronary angioplasty with stent ([id number]); and Skin cancer excision. Social History:   reports that he has never smoked. He has never used smokeless tobacco. He reports that he drinks alcohol. He reports that he does not use drugs. Family History:  family history includes Arthritis in his father. Home Medications:  Prior to Admission medications    Medication Sig Start Date End Date Taking?  Authorizing Provider   losartan (COZAAR) 100 MG tablet TAKE ONE TABLET BY MOUTH rachelle's documentation has been prepared under my direction and personally reviewed by me in its entirety. I confirm that the note above accurately reflects all work, treatment, procedures, and medical decision making performed by me.

## 2018-10-22 ENCOUNTER — OFFICE VISIT (OUTPATIENT)
Dept: CARDIOLOGY CLINIC | Age: 78
End: 2018-10-22
Payer: MEDICARE

## 2018-10-22 VITALS
BODY MASS INDEX: 29.06 KG/M2 | HEART RATE: 68 BPM | HEIGHT: 70 IN | SYSTOLIC BLOOD PRESSURE: 156 MMHG | WEIGHT: 203 LBS | DIASTOLIC BLOOD PRESSURE: 74 MMHG

## 2018-10-22 DIAGNOSIS — I25.10 CORONARY ARTERY DISEASE INVOLVING NATIVE CORONARY ARTERY OF NATIVE HEART WITHOUT ANGINA PECTORIS: ICD-10-CM

## 2018-10-22 DIAGNOSIS — R60.0 EDEMA OF BOTH LEGS: Primary | ICD-10-CM

## 2018-10-22 DIAGNOSIS — E78.49 OTHER HYPERLIPIDEMIA: ICD-10-CM

## 2018-10-22 DIAGNOSIS — I10 ESSENTIAL HYPERTENSION: ICD-10-CM

## 2018-10-22 PROCEDURE — 1123F ACP DISCUSS/DSCN MKR DOCD: CPT | Performed by: INTERNAL MEDICINE

## 2018-10-22 PROCEDURE — G8598 ASA/ANTIPLAT THER USED: HCPCS | Performed by: INTERNAL MEDICINE

## 2018-10-22 PROCEDURE — 1036F TOBACCO NON-USER: CPT | Performed by: INTERNAL MEDICINE

## 2018-10-22 PROCEDURE — 99214 OFFICE O/P EST MOD 30 MIN: CPT | Performed by: INTERNAL MEDICINE

## 2018-10-22 PROCEDURE — 1101F PT FALLS ASSESS-DOCD LE1/YR: CPT | Performed by: INTERNAL MEDICINE

## 2018-10-22 PROCEDURE — G8484 FLU IMMUNIZE NO ADMIN: HCPCS | Performed by: INTERNAL MEDICINE

## 2018-10-22 PROCEDURE — G8427 DOCREV CUR MEDS BY ELIG CLIN: HCPCS | Performed by: INTERNAL MEDICINE

## 2018-10-22 PROCEDURE — G8417 CALC BMI ABV UP PARAM F/U: HCPCS | Performed by: INTERNAL MEDICINE

## 2018-10-22 PROCEDURE — 4040F PNEUMOC VAC/ADMIN/RCVD: CPT | Performed by: INTERNAL MEDICINE

## 2018-10-22 RX ORDER — MULTIVIT-MIN/IRON/FOLIC ACID/K 18-600-40
2000 CAPSULE ORAL DAILY
Qty: 30 CAPSULE | Refills: 5
Start: 2018-10-22

## 2018-10-23 RX ORDER — INSULIN LISPRO 100 [IU]/ML
INJECTION, SUSPENSION SUBCUTANEOUS
Qty: 11 PEN | Refills: 0 | Status: SHIPPED | OUTPATIENT
Start: 2018-10-23 | End: 2018-11-08 | Stop reason: SDUPTHER

## 2018-10-24 ENCOUNTER — HOSPITAL ENCOUNTER (OUTPATIENT)
Dept: NON INVASIVE DIAGNOSTICS | Age: 78
Discharge: HOME | End: 2018-10-24
Payer: MEDICARE

## 2018-10-24 DIAGNOSIS — R60.0 EDEMA OF BOTH LEGS: ICD-10-CM

## 2018-10-24 DIAGNOSIS — I25.10 CORONARY ARTERY DISEASE INVOLVING NATIVE CORONARY ARTERY OF NATIVE HEART WITHOUT ANGINA PECTORIS: ICD-10-CM

## 2018-10-24 DIAGNOSIS — I10 ESSENTIAL HYPERTENSION: ICD-10-CM

## 2018-10-24 LAB
LV EF: 58 %
LVEF MODALITY: NORMAL

## 2018-10-24 PROCEDURE — 93306 TTE W/DOPPLER COMPLETE: CPT

## 2018-10-26 RX ORDER — LOSARTAN POTASSIUM 100 MG/1
TABLET ORAL
Qty: 30 TABLET | Refills: 1 | Status: SHIPPED | OUTPATIENT
Start: 2018-10-26 | End: 2018-11-26

## 2018-11-09 RX ORDER — INSULIN LISPRO 100 [IU]/ML
INJECTION, SUSPENSION SUBCUTANEOUS
Qty: 11 PEN | Refills: 0 | Status: SHIPPED | OUTPATIENT
Start: 2018-11-09 | End: 2018-12-28 | Stop reason: SDUPTHER

## 2018-11-12 DIAGNOSIS — E03.9 HYPOTHYROIDISM, UNSPECIFIED TYPE: ICD-10-CM

## 2018-11-13 RX ORDER — LEVOTHYROXINE SODIUM 0.03 MG/1
TABLET ORAL
Qty: 30 TABLET | Refills: 0 | Status: SHIPPED | OUTPATIENT
Start: 2018-11-13 | End: 2018-12-10 | Stop reason: SDUPTHER

## 2018-11-14 ENCOUNTER — HOSPITAL ENCOUNTER (OUTPATIENT)
Age: 78
Discharge: HOME | End: 2018-11-14
Payer: MEDICARE

## 2018-11-14 LAB
A/G RATIO: 1.3 (ref 1.1–2.2)
ALBUMIN SERPL-MCNC: 4.5 G/DL (ref 3.4–5)
ALP BLD-CCNC: 58 U/L (ref 40–129)
ALT SERPL-CCNC: 11 U/L (ref 10–40)
ANION GAP SERPL CALCULATED.3IONS-SCNC: 13 MMOL/L (ref 3–16)
AST SERPL-CCNC: 20 U/L (ref 15–37)
BASOPHILS ABSOLUTE: 0.1 K/UL (ref 0–0.2)
BASOPHILS RELATIVE PERCENT: 1.3 %
BILIRUB SERPL-MCNC: 1.1 MG/DL (ref 0–1)
BUN BLDV-MCNC: 28 MG/DL (ref 7–20)
CALCIUM SERPL-MCNC: 10 MG/DL (ref 8.3–10.6)
CHLORIDE BLD-SCNC: 105 MMOL/L (ref 99–110)
CHOLESTEROL, TOTAL: 92 MG/DL (ref 0–199)
CO2: 24 MMOL/L (ref 21–32)
CREAT SERPL-MCNC: 1.2 MG/DL (ref 0.8–1.3)
EOSINOPHILS ABSOLUTE: 0.6 K/UL (ref 0–0.6)
EOSINOPHILS RELATIVE PERCENT: 7 %
GFR AFRICAN AMERICAN: >60
GFR NON-AFRICAN AMERICAN: 58
GLOBULIN: 3.5 G/DL
GLUCOSE BLD-MCNC: 97 MG/DL (ref 70–99)
HCT VFR BLD CALC: 42.2 % (ref 40.5–52.5)
HDLC SERPL-MCNC: 46 MG/DL (ref 40–60)
HEMOGLOBIN: 13.8 G/DL (ref 13.5–17.5)
LDL CHOLESTEROL CALCULATED: 30 MG/DL
LYMPHOCYTES ABSOLUTE: 1.5 K/UL (ref 1–5.1)
LYMPHOCYTES RELATIVE PERCENT: 17.2 %
MCH RBC QN AUTO: 28.5 PG (ref 26–34)
MCHC RBC AUTO-ENTMCNC: 32.6 G/DL (ref 31–36)
MCV RBC AUTO: 87.5 FL (ref 80–100)
MONOCYTES ABSOLUTE: 0.9 K/UL (ref 0–1.3)
MONOCYTES RELATIVE PERCENT: 10.4 %
NEUTROPHILS ABSOLUTE: 5.5 K/UL (ref 1.7–7.7)
NEUTROPHILS RELATIVE PERCENT: 64.1 %
PDW BLD-RTO: 16.5 % (ref 12.4–15.4)
PLATELET # BLD: 275 K/UL (ref 135–450)
PMV BLD AUTO: 7.7 FL (ref 5–10.5)
POTASSIUM SERPL-SCNC: 4.9 MMOL/L (ref 3.5–5.1)
RBC # BLD: 4.82 M/UL (ref 4.2–5.9)
SODIUM BLD-SCNC: 142 MMOL/L (ref 136–145)
TOTAL CK: 246 U/L (ref 39–308)
TOTAL PROTEIN: 8 G/DL (ref 6.4–8.2)
TRIGL SERPL-MCNC: 81 MG/DL (ref 0–150)
TSH SERPL DL<=0.05 MIU/L-ACNC: 3.46 UIU/ML (ref 0.27–4.2)
VLDLC SERPL CALC-MCNC: 16 MG/DL
WBC # BLD: 8.6 K/UL (ref 4–11)

## 2018-11-14 PROCEDURE — 83036 HEMOGLOBIN GLYCOSYLATED A1C: CPT

## 2018-11-14 PROCEDURE — 80053 COMPREHEN METABOLIC PANEL: CPT

## 2018-11-14 PROCEDURE — 82550 ASSAY OF CK (CPK): CPT

## 2018-11-14 PROCEDURE — 80061 LIPID PANEL: CPT

## 2018-11-14 PROCEDURE — 36415 COLL VENOUS BLD VENIPUNCTURE: CPT

## 2018-11-14 PROCEDURE — 85025 COMPLETE CBC W/AUTO DIFF WBC: CPT

## 2018-11-14 PROCEDURE — 84443 ASSAY THYROID STIM HORMONE: CPT

## 2018-11-15 LAB
ESTIMATED AVERAGE GLUCOSE: 154.2 MG/DL
HBA1C MFR BLD: 7 %

## 2018-11-26 ENCOUNTER — OFFICE VISIT (OUTPATIENT)
Dept: FAMILY MEDICINE CLINIC | Age: 78
End: 2018-11-26
Payer: MEDICARE

## 2018-11-26 ENCOUNTER — HOSPITAL ENCOUNTER (OUTPATIENT)
Dept: GENERAL RADIOLOGY | Age: 78
Discharge: HOME | End: 2018-11-26
Payer: MEDICARE

## 2018-11-26 ENCOUNTER — HOSPITAL ENCOUNTER (OUTPATIENT)
Age: 78
Discharge: HOME | End: 2018-11-26
Payer: MEDICARE

## 2018-11-26 VITALS
DIASTOLIC BLOOD PRESSURE: 74 MMHG | OXYGEN SATURATION: 95 % | HEART RATE: 72 BPM | SYSTOLIC BLOOD PRESSURE: 144 MMHG | WEIGHT: 200 LBS | BODY MASS INDEX: 28.7 KG/M2

## 2018-11-26 DIAGNOSIS — R05.9 COUGH: ICD-10-CM

## 2018-11-26 DIAGNOSIS — G89.29 CHRONIC LEFT SHOULDER PAIN: ICD-10-CM

## 2018-11-26 DIAGNOSIS — G89.29 CHRONIC LEFT SHOULDER PAIN: Primary | ICD-10-CM

## 2018-11-26 DIAGNOSIS — M25.512 CHRONIC LEFT SHOULDER PAIN: ICD-10-CM

## 2018-11-26 DIAGNOSIS — M25.512 CHRONIC LEFT SHOULDER PAIN: Primary | ICD-10-CM

## 2018-11-26 DIAGNOSIS — R06.2 WHEEZING: ICD-10-CM

## 2018-11-26 DIAGNOSIS — I10 ESSENTIAL HYPERTENSION: ICD-10-CM

## 2018-11-26 PROCEDURE — 1123F ACP DISCUSS/DSCN MKR DOCD: CPT | Performed by: FAMILY MEDICINE

## 2018-11-26 PROCEDURE — G8482 FLU IMMUNIZE ORDER/ADMIN: HCPCS | Performed by: FAMILY MEDICINE

## 2018-11-26 PROCEDURE — 1101F PT FALLS ASSESS-DOCD LE1/YR: CPT | Performed by: FAMILY MEDICINE

## 2018-11-26 PROCEDURE — G8427 DOCREV CUR MEDS BY ELIG CLIN: HCPCS | Performed by: FAMILY MEDICINE

## 2018-11-26 PROCEDURE — G8598 ASA/ANTIPLAT THER USED: HCPCS | Performed by: FAMILY MEDICINE

## 2018-11-26 PROCEDURE — 1036F TOBACCO NON-USER: CPT | Performed by: FAMILY MEDICINE

## 2018-11-26 PROCEDURE — G8417 CALC BMI ABV UP PARAM F/U: HCPCS | Performed by: FAMILY MEDICINE

## 2018-11-26 PROCEDURE — 99214 OFFICE O/P EST MOD 30 MIN: CPT | Performed by: FAMILY MEDICINE

## 2018-11-26 PROCEDURE — 4040F PNEUMOC VAC/ADMIN/RCVD: CPT | Performed by: FAMILY MEDICINE

## 2018-11-26 PROCEDURE — 71046 X-RAY EXAM CHEST 2 VIEWS: CPT

## 2018-11-26 PROCEDURE — 94640 AIRWAY INHALATION TREATMENT: CPT | Performed by: FAMILY MEDICINE

## 2018-11-26 PROCEDURE — 73030 X-RAY EXAM OF SHOULDER: CPT

## 2018-11-26 RX ORDER — LEVOFLOXACIN 750 MG/1
750 TABLET ORAL DAILY
Qty: 5 TABLET | Refills: 0 | Status: SHIPPED | OUTPATIENT
Start: 2018-11-26 | End: 2018-12-01

## 2018-11-26 RX ORDER — ALBUTEROL SULFATE 2.5 MG/3ML
2.5 SOLUTION RESPIRATORY (INHALATION) ONCE
Status: COMPLETED | OUTPATIENT
Start: 2018-11-26 | End: 2018-11-26

## 2018-11-26 RX ORDER — IRBESARTAN AND HYDROCHLOROTHIAZIDE 150; 12.5 MG/1; MG/1
2 TABLET, FILM COATED ORAL DAILY
Qty: 60 TABLET | Refills: 2 | Status: SHIPPED | OUTPATIENT
Start: 2018-11-26 | End: 2019-02-06

## 2018-11-26 RX ADMIN — ALBUTEROL SULFATE 2.5 MG: 2.5 SOLUTION RESPIRATORY (INHALATION) at 14:24

## 2018-11-26 ASSESSMENT — ENCOUNTER SYMPTOMS
WHEEZING: 1
COUGH: 1
SHORTNESS OF BREATH: 0

## 2018-11-26 NOTE — PROGRESS NOTES
Jackie Ricks  : 1940  Encounter date: 2018    This jackson 77 y.o. male who presents with  Chief Complaint   Patient presents with    Shoulder Pain     Patient presents with left shoulder pain. Patient states pain comes and goes. Patient is also experiencing left knee pain. Patient would like to discuss lab work also. History of present illness:    Shoulder pain did resolve with prednisone burst previously. Now also with left knee pain. No weakness of shoulder, just hurts with use and pushing self out of chair. Wondering about exercises that may help him. Has been bothering him for a couple of months; getting worse gradually but now pretty significant. He has had steroid injection in left shoulder in very distant past that did help with pain. No known injury/trauma. Left knee pain has been there for years. Anterior knee. Riding in car more than an hour gets stiffness. Will be painful then when he starts walking again. Feels that he will get some        blood pressures at home are running in 140-150's/70's mostly with HR in 60-70's. Down about 5-10 points average with increase in the losartan. Allergies   Allergen Reactions    Lipitor     Procardia [Nifedipine]     Statins Other (See Comments)     Myalgias: severe reaction.    Now taking vitamin D and can take low dose statin    Tetracyclines & Related     Zestril [Lisinopril] Swelling     Ankles ~ 20 yrs ago     Outpatient Prescriptions Marked as Taking for the 18 encounter (Office Visit) with Miya Dickson MD   Medication Sig Dispense Refill    irbesartan-hydrochlorothiazide (AVALIDE) 150-12.5 MG per tablet Take 2 tablets by mouth daily 60 tablet 2    levofloxacin (LEVAQUIN) 750 MG tablet Take 1 tablet by mouth daily for 5 days 5 tablet 0    levothyroxine (SYNTHROID) 25 MCG tablet TAKE ONE TABLET BY MOUTH DAILY 30 tablet 0    NOVOFINE 32G X 6 MM MISC USE TO INJECT INSULIN TWO TIMES A  each 1    HUMALOG MIX DANIE.    4. Cough  Xray results returned before note completed; possible RLL pneumonia. Will cover with levaquin.  - SC PRESSURIZED/NONPRESSURIZED INHALATION TREATMENT  - albuterol (PROVENTIL) nebulizer solution 2.5 mg; Take 3 mLs by nebulization once      Return for pending imaging.

## 2018-11-26 NOTE — Clinical Note
I am seeing Mr. Navid Liriano today in the office and we were reviewing bloodwork. He is currently taking just 5mg of crestor daily; we were wondering about your opinion for LDL being too low? If you would recommend keeping things the same or possible adjustment. Thank you!

## 2018-11-27 ENCOUNTER — TELEPHONE (OUTPATIENT)
Dept: RHEUMATOLOGY | Age: 78
End: 2018-11-27

## 2018-11-28 ENCOUNTER — HOSPITAL ENCOUNTER (OUTPATIENT)
Dept: MRI IMAGING | Age: 78
Discharge: HOME | End: 2018-11-28
Payer: MEDICARE

## 2018-11-28 DIAGNOSIS — G89.29 CHRONIC LEFT SHOULDER PAIN: ICD-10-CM

## 2018-11-28 DIAGNOSIS — M25.512 CHRONIC LEFT SHOULDER PAIN: ICD-10-CM

## 2018-11-28 PROCEDURE — 73221 MRI JOINT UPR EXTREM W/O DYE: CPT

## 2018-11-29 DIAGNOSIS — M75.102 TEAR OF LEFT ROTATOR CUFF, UNSPECIFIED TEAR EXTENT: Primary | ICD-10-CM

## 2018-11-30 ENCOUNTER — TELEPHONE (OUTPATIENT)
Dept: FAMILY MEDICINE CLINIC | Age: 78
End: 2018-11-30

## 2018-12-07 ENCOUNTER — TELEPHONE (OUTPATIENT)
Dept: CARDIOLOGY CLINIC | Age: 78
End: 2018-12-07

## 2018-12-07 NOTE — TELEPHONE ENCOUNTER
Please advise  Currently on aspirin 81 mg and plavix 75 mg needs to be off for 7 days prior     Assessment: on 10/22/18  1. CAD (coronary artery disease): Stable, no anginal symptoms. Mild LE edema (suggested support hose & elevation) -- ?proteinuria or venous insufficiency. 12/26/13 Cath> 40% proximal RCA,50% PDA,99% prox LAD EF 60%PCI with 3.5 x 15 expedition with excellent result,small diagonal occluded after case without symptoms. 12/15/14 GXT Aftab> Small-moderate sized inferolateral completely reversible defect consistent with ischemia in the territory of the distal LCx   Lexiscan stress 8/25/16> Stable   2. Hypertension: Stable. Blood pressure (!) 156/74, pulse 68, height 5' 10\" (1.778 m), weight 203 lb (92.1 kg). 3. Hyperlipemia:  6/13/18> , TG 99, HDL 50, LDL 39. History of Rhabdomyolysis on lipitor. Tolerating Crestor 20mg. 4. Type II or unspecified type diabetes mellitus without mention of complication, not stated as uncontrolled: Stable. Managed per pcp.   5.  Diabetic neuropathy: Stable        Plan:   Mr. Rose Sousa has a stable cardiac status. Rev'd labs from June 2018. 1. ECHO to assess cardiac/valvular function. 2. No med changes. Should try to avoid taking Naproxen as this can contribute to edema. 3. Will continue with risk factor modifications. 4. Return for regular follow up in 6 months if ECHO stable.

## 2018-12-10 DIAGNOSIS — E03.9 HYPOTHYROIDISM, UNSPECIFIED TYPE: ICD-10-CM

## 2018-12-10 RX ORDER — LEVOTHYROXINE SODIUM 0.03 MG/1
TABLET ORAL
Qty: 30 TABLET | Refills: 0 | Status: SHIPPED | OUTPATIENT
Start: 2018-12-10 | End: 2019-01-07 | Stop reason: SDUPTHER

## 2018-12-13 ENCOUNTER — TELEPHONE (OUTPATIENT)
Dept: FAMILY MEDICINE CLINIC | Age: 78
End: 2018-12-13

## 2018-12-17 ENCOUNTER — TELEPHONE (OUTPATIENT)
Dept: CARDIOLOGY CLINIC | Age: 78
End: 2018-12-17

## 2018-12-18 RX ORDER — ROSUVASTATIN CALCIUM 5 MG/1
TABLET, COATED ORAL
Qty: 90 TABLET | Refills: 0 | Status: SHIPPED | OUTPATIENT
Start: 2018-12-18 | End: 2019-03-20 | Stop reason: SDUPTHER

## 2018-12-28 ENCOUNTER — OFFICE VISIT (OUTPATIENT)
Dept: FAMILY MEDICINE CLINIC | Age: 78
End: 2018-12-28
Payer: MEDICARE

## 2018-12-28 VITALS
TEMPERATURE: 98 F | OXYGEN SATURATION: 97 % | HEART RATE: 70 BPM | SYSTOLIC BLOOD PRESSURE: 148 MMHG | DIASTOLIC BLOOD PRESSURE: 61 MMHG | BODY MASS INDEX: 29.25 KG/M2 | WEIGHT: 193 LBS | HEIGHT: 68 IN

## 2018-12-28 DIAGNOSIS — R25.1 TREMOR OF BOTH HANDS: ICD-10-CM

## 2018-12-28 DIAGNOSIS — E11.42 DM TYPE 2 WITH DIABETIC PERIPHERAL NEUROPATHY (HCC): Primary | ICD-10-CM

## 2018-12-28 DIAGNOSIS — I25.10 CORONARY ARTERY DISEASE INVOLVING NATIVE CORONARY ARTERY OF NATIVE HEART WITHOUT ANGINA PECTORIS: ICD-10-CM

## 2018-12-28 DIAGNOSIS — I10 ESSENTIAL HYPERTENSION: ICD-10-CM

## 2018-12-28 DIAGNOSIS — E78.49 OTHER HYPERLIPIDEMIA: ICD-10-CM

## 2018-12-28 PROCEDURE — G8598 ASA/ANTIPLAT THER USED: HCPCS | Performed by: FAMILY MEDICINE

## 2018-12-28 PROCEDURE — 1101F PT FALLS ASSESS-DOCD LE1/YR: CPT | Performed by: FAMILY MEDICINE

## 2018-12-28 PROCEDURE — 1036F TOBACCO NON-USER: CPT | Performed by: FAMILY MEDICINE

## 2018-12-28 PROCEDURE — 4040F PNEUMOC VAC/ADMIN/RCVD: CPT | Performed by: FAMILY MEDICINE

## 2018-12-28 PROCEDURE — 1123F ACP DISCUSS/DSCN MKR DOCD: CPT | Performed by: FAMILY MEDICINE

## 2018-12-28 PROCEDURE — G8417 CALC BMI ABV UP PARAM F/U: HCPCS | Performed by: FAMILY MEDICINE

## 2018-12-28 PROCEDURE — G8427 DOCREV CUR MEDS BY ELIG CLIN: HCPCS | Performed by: FAMILY MEDICINE

## 2018-12-28 PROCEDURE — G8482 FLU IMMUNIZE ORDER/ADMIN: HCPCS | Performed by: FAMILY MEDICINE

## 2018-12-28 PROCEDURE — 99214 OFFICE O/P EST MOD 30 MIN: CPT | Performed by: FAMILY MEDICINE

## 2018-12-28 RX ORDER — INSULIN LISPRO 100 [IU]/ML
55 INJECTION, SUSPENSION SUBCUTANEOUS 2 TIMES DAILY WITH MEALS
Qty: 11 PEN | Refills: 0
Start: 2018-12-28 | End: 2019-08-07

## 2019-01-07 DIAGNOSIS — E03.9 HYPOTHYROIDISM, UNSPECIFIED TYPE: ICD-10-CM

## 2019-01-08 RX ORDER — LEVOTHYROXINE SODIUM 0.03 MG/1
TABLET ORAL
Qty: 30 TABLET | Refills: 3 | Status: SHIPPED | OUTPATIENT
Start: 2019-01-08 | End: 2019-05-14 | Stop reason: SDUPTHER

## 2019-01-14 ENCOUNTER — TELEPHONE (OUTPATIENT)
Dept: FAMILY MEDICINE CLINIC | Age: 79
End: 2019-01-14

## 2019-01-15 ENCOUNTER — NURSE ONLY (OUTPATIENT)
Dept: FAMILY MEDICINE CLINIC | Age: 79
End: 2019-01-15
Payer: MEDICARE

## 2019-01-15 DIAGNOSIS — Z01.818 PRE-OP EXAM: Primary | ICD-10-CM

## 2019-01-15 PROCEDURE — 93000 ELECTROCARDIOGRAM COMPLETE: CPT | Performed by: FAMILY MEDICINE

## 2019-01-24 ENCOUNTER — TELEPHONE (OUTPATIENT)
Dept: FAMILY MEDICINE CLINIC | Age: 79
End: 2019-01-24

## 2019-01-24 RX ORDER — INSULIN LISPRO 100 [IU]/ML
INJECTION, SUSPENSION SUBCUTANEOUS
Qty: 15 PEN | Refills: 0 | Status: SHIPPED | OUTPATIENT
Start: 2019-01-24 | End: 2019-02-24 | Stop reason: SDUPTHER

## 2019-02-06 ENCOUNTER — OFFICE VISIT (OUTPATIENT)
Dept: FAMILY MEDICINE CLINIC | Age: 79
End: 2019-02-06
Payer: MEDICARE

## 2019-02-06 VITALS
WEIGHT: 190 LBS | BODY MASS INDEX: 29.32 KG/M2 | SYSTOLIC BLOOD PRESSURE: 140 MMHG | OXYGEN SATURATION: 93 % | HEART RATE: 47 BPM | DIASTOLIC BLOOD PRESSURE: 72 MMHG

## 2019-02-06 DIAGNOSIS — Z12.5 SCREENING FOR PROSTATE CANCER: ICD-10-CM

## 2019-02-06 DIAGNOSIS — E11.9 DIABETES MELLITUS WITHOUT COMPLICATION (HCC): ICD-10-CM

## 2019-02-06 DIAGNOSIS — E78.5 HYPERLIPIDEMIA, UNSPECIFIED HYPERLIPIDEMIA TYPE: Primary | ICD-10-CM

## 2019-02-06 DIAGNOSIS — E03.9 HYPOTHYROIDISM, UNSPECIFIED TYPE: ICD-10-CM

## 2019-02-06 DIAGNOSIS — I10 ESSENTIAL HYPERTENSION: ICD-10-CM

## 2019-02-06 DIAGNOSIS — E11.42 DM TYPE 2 WITH DIABETIC PERIPHERAL NEUROPATHY (HCC): ICD-10-CM

## 2019-02-06 PROCEDURE — G8598 ASA/ANTIPLAT THER USED: HCPCS | Performed by: INTERNAL MEDICINE

## 2019-02-06 PROCEDURE — 99214 OFFICE O/P EST MOD 30 MIN: CPT | Performed by: INTERNAL MEDICINE

## 2019-02-06 PROCEDURE — G8427 DOCREV CUR MEDS BY ELIG CLIN: HCPCS | Performed by: INTERNAL MEDICINE

## 2019-02-06 PROCEDURE — G8417 CALC BMI ABV UP PARAM F/U: HCPCS | Performed by: INTERNAL MEDICINE

## 2019-02-06 PROCEDURE — 1123F ACP DISCUSS/DSCN MKR DOCD: CPT | Performed by: INTERNAL MEDICINE

## 2019-02-06 PROCEDURE — 1036F TOBACCO NON-USER: CPT | Performed by: INTERNAL MEDICINE

## 2019-02-06 PROCEDURE — G8482 FLU IMMUNIZE ORDER/ADMIN: HCPCS | Performed by: INTERNAL MEDICINE

## 2019-02-06 PROCEDURE — 1101F PT FALLS ASSESS-DOCD LE1/YR: CPT | Performed by: INTERNAL MEDICINE

## 2019-02-06 PROCEDURE — 4040F PNEUMOC VAC/ADMIN/RCVD: CPT | Performed by: INTERNAL MEDICINE

## 2019-02-06 RX ORDER — HYDROCHLOROTHIAZIDE 25 MG/1
25 TABLET ORAL EVERY MORNING
Qty: 90 TABLET | Refills: 1 | Status: SHIPPED | OUTPATIENT
Start: 2019-02-06 | End: 2019-08-02 | Stop reason: SDUPTHER

## 2019-02-06 RX ORDER — IRBESARTAN 300 MG/1
300 TABLET ORAL DAILY
Qty: 90 TABLET | Refills: 1 | Status: SHIPPED | OUTPATIENT
Start: 2019-02-06 | End: 2019-08-07 | Stop reason: SDUPTHER

## 2019-02-06 ASSESSMENT — ENCOUNTER SYMPTOMS
SINUS PRESSURE: 0
CHEST TIGHTNESS: 0
COUGH: 0
SHORTNESS OF BREATH: 0
DIARRHEA: 0
CONSTIPATION: 0
ABDOMINAL PAIN: 0
NAUSEA: 0
VOMITING: 0

## 2019-02-06 ASSESSMENT — PATIENT HEALTH QUESTIONNAIRE - PHQ9
SUM OF ALL RESPONSES TO PHQ9 QUESTIONS 1 & 2: 0
2. FEELING DOWN, DEPRESSED OR HOPELESS: 0
SUM OF ALL RESPONSES TO PHQ QUESTIONS 1-9: 0
1. LITTLE INTEREST OR PLEASURE IN DOING THINGS: 0
SUM OF ALL RESPONSES TO PHQ QUESTIONS 1-9: 0

## 2019-02-13 DIAGNOSIS — E11.42 DM TYPE 2 WITH DIABETIC PERIPHERAL NEUROPATHY (HCC): Primary | ICD-10-CM

## 2019-02-25 ENCOUNTER — OFFICE VISIT (OUTPATIENT)
Dept: NEUROLOGY | Age: 79
End: 2019-02-25
Payer: MEDICARE

## 2019-02-25 VITALS
HEIGHT: 68 IN | HEART RATE: 68 BPM | SYSTOLIC BLOOD PRESSURE: 187 MMHG | DIASTOLIC BLOOD PRESSURE: 71 MMHG | BODY MASS INDEX: 28.79 KG/M2 | WEIGHT: 190 LBS

## 2019-02-25 DIAGNOSIS — R27.0 ATAXIA: ICD-10-CM

## 2019-02-25 DIAGNOSIS — G20 PARKINSON'S DISEASE (HCC): Primary | ICD-10-CM

## 2019-02-25 DIAGNOSIS — E11.42 DIABETIC PERIPHERAL NEUROPATHY (HCC): ICD-10-CM

## 2019-02-25 PROBLEM — G20.A1 PARKINSON'S DISEASE: Status: ACTIVE | Noted: 2019-02-25

## 2019-02-25 PROCEDURE — G8482 FLU IMMUNIZE ORDER/ADMIN: HCPCS | Performed by: PSYCHIATRY & NEUROLOGY

## 2019-02-25 PROCEDURE — 99205 OFFICE O/P NEW HI 60 MIN: CPT | Performed by: PSYCHIATRY & NEUROLOGY

## 2019-02-25 PROCEDURE — G8598 ASA/ANTIPLAT THER USED: HCPCS | Performed by: PSYCHIATRY & NEUROLOGY

## 2019-02-25 PROCEDURE — 1036F TOBACCO NON-USER: CPT | Performed by: PSYCHIATRY & NEUROLOGY

## 2019-02-25 PROCEDURE — 4040F PNEUMOC VAC/ADMIN/RCVD: CPT | Performed by: PSYCHIATRY & NEUROLOGY

## 2019-02-25 PROCEDURE — G8427 DOCREV CUR MEDS BY ELIG CLIN: HCPCS | Performed by: PSYCHIATRY & NEUROLOGY

## 2019-02-25 PROCEDURE — 1123F ACP DISCUSS/DSCN MKR DOCD: CPT | Performed by: PSYCHIATRY & NEUROLOGY

## 2019-02-25 PROCEDURE — 1101F PT FALLS ASSESS-DOCD LE1/YR: CPT | Performed by: PSYCHIATRY & NEUROLOGY

## 2019-02-25 PROCEDURE — G8417 CALC BMI ABV UP PARAM F/U: HCPCS | Performed by: PSYCHIATRY & NEUROLOGY

## 2019-02-25 RX ORDER — INSULIN LISPRO 100 [IU]/ML
INJECTION, SUSPENSION SUBCUTANEOUS
Qty: 15 PEN | Refills: 5 | Status: SHIPPED | OUTPATIENT
Start: 2019-02-25 | End: 2019-08-07 | Stop reason: SDUPTHER

## 2019-03-20 RX ORDER — ROSUVASTATIN CALCIUM 5 MG/1
TABLET, COATED ORAL
Qty: 90 TABLET | Refills: 3 | Status: SHIPPED | OUTPATIENT
Start: 2019-03-20 | End: 2020-03-20 | Stop reason: SDUPTHER

## 2019-04-18 ENCOUNTER — OFFICE VISIT (OUTPATIENT)
Dept: NEUROLOGY | Age: 79
End: 2019-04-18
Payer: MEDICARE

## 2019-04-18 VITALS
HEART RATE: 65 BPM | WEIGHT: 193 LBS | BODY MASS INDEX: 29.35 KG/M2 | DIASTOLIC BLOOD PRESSURE: 59 MMHG | SYSTOLIC BLOOD PRESSURE: 153 MMHG

## 2019-04-18 DIAGNOSIS — G20 PARKINSON'S DISEASE (HCC): Primary | ICD-10-CM

## 2019-04-18 DIAGNOSIS — E11.42 DIABETIC PERIPHERAL NEUROPATHY (HCC): ICD-10-CM

## 2019-04-18 DIAGNOSIS — R27.0 ATAXIA: ICD-10-CM

## 2019-04-18 PROCEDURE — 4040F PNEUMOC VAC/ADMIN/RCVD: CPT | Performed by: PSYCHIATRY & NEUROLOGY

## 2019-04-18 PROCEDURE — G8417 CALC BMI ABV UP PARAM F/U: HCPCS | Performed by: PSYCHIATRY & NEUROLOGY

## 2019-04-18 PROCEDURE — 1123F ACP DISCUSS/DSCN MKR DOCD: CPT | Performed by: PSYCHIATRY & NEUROLOGY

## 2019-04-18 PROCEDURE — G8598 ASA/ANTIPLAT THER USED: HCPCS | Performed by: PSYCHIATRY & NEUROLOGY

## 2019-04-18 PROCEDURE — 99214 OFFICE O/P EST MOD 30 MIN: CPT | Performed by: PSYCHIATRY & NEUROLOGY

## 2019-04-18 PROCEDURE — 1036F TOBACCO NON-USER: CPT | Performed by: PSYCHIATRY & NEUROLOGY

## 2019-04-18 PROCEDURE — G8427 DOCREV CUR MEDS BY ELIG CLIN: HCPCS | Performed by: PSYCHIATRY & NEUROLOGY

## 2019-04-18 NOTE — PROGRESS NOTES
[facility]   Neurology followup    Subjective:   CC/HP  History was obtained from the patient. Additional history was obtained from the patient's wife. Patient feels that the tremor has improved. His wife doesn't see much change. Patient has mild stiffness. No significant gait difficulties. No side effects to medication. Detailed history:  Patient complaints of tremors in both his arms. It initially started about a year ago and seems to be slowly getting worse. Onset was gradual.  Symptoms are persistent. No clear aggravating or relieving factors to symptoms. Patient denies any stiffness. He denies any difficulty with his gait but his wife states that sometimes he would lose his balance. Patient has known diabetes which is well controlled  Patient denies any difficulty getting in and out of a car or rolling over in bed.   Patient complains of some generalized weakness in his legs      REVIEW OF SYSTEMS    Constitutional:  []   Chills   []  Fatigue   []  Fevers   []  Malaise   []  Weight loss     [x] Denies all of the above    Respiratory:   []  Cough    []  Shortness of breath         [x] Denies all of the above     Cardiovascular:   []  Chest pain    []  Exertional chest pressure/discomfort           [] Palpitations    []  Syncope     [x] Denies all of the above        Past Medical History:   Diagnosis Date    Arthropathy, unspecified, site unspecified     Hypertension     Hypertrophy of prostate without urinary obstruction and other lower urinary tract symptoms (LUTS)     Impotence of organic origin     Inguinal hernia     Myalgia and myositis, unspecified     Other and unspecified hyperlipidemia     Retinal microaneurysms NOS     Type II or unspecified type diabetes mellitus without mention of complication, not stated as uncontrolled      Family History   Problem Relation Age of Onset    Arthritis Father     Lupus Neg Hx     Rheum Arthritis Neg Hx     Heart Disease Neg Hx      Social Component Value Date    WBC 8.6 11/14/2018    RBC 4.82 11/14/2018    HGB 13.8 11/14/2018    HCT 42.2 11/14/2018    MCV 87.5 11/14/2018    MCH 28.5 11/14/2018    MCHC 32.6 11/14/2018    RDW 16.5 11/14/2018     11/14/2018    MPV 7.7 11/14/2018     BMP:    Lab Results   Component Value Date     11/14/2018    K 4.9 11/14/2018     11/14/2018    CO2 24 11/14/2018    BUN 28 11/14/2018    LABALBU 4.5 11/14/2018    CREATININE 1.2 11/14/2018    CALCIUM 10.0 11/14/2018    GFRAA >60 11/14/2018    GFRAA >60 11/02/2012    LABGLOM 58 11/14/2018    GLUCOSE 97 11/14/2018       Impression :  Parkinson's disease  Tremor, mild improvement  Ataxia  Diabetic peripheral neuropathy    Plan :  Discussed with patient and his wife  Increase Sinemet dose to 25/100, one and one half tablet 3 times daily  Side effects were discussed. Continue with aggressive control of diabetes  Return in 3 months          Please note a portion of  this chart was generated using dragon dictation software. Although every effort was made to ensure the accuracy of this automated transcription, some errors in transcription may have occurred.

## 2019-04-18 NOTE — PATIENT INSTRUCTIONS
Please call with any questions or concerns:   [facility]  @ [phone]. LAB RESULTS:  Please obtain any labs or diagnostic tests as discussed today. You should call the office to check the results. Please allow  3 to 7 days for us to get these results. MEDICATION LIST:  Please bring an accurate list of your medications to every visit. APPOINTMENT CONFIRMATION:  We will call you the day before your scheduled appointment to confirm. If we are unable to reach you, you MUST call back by the end of the day to confirm the appointment or we may be forced to cancel.

## 2019-04-25 ENCOUNTER — OFFICE VISIT (OUTPATIENT)
Dept: CARDIOLOGY CLINIC | Age: 79
End: 2019-04-25
Payer: MEDICARE

## 2019-04-25 VITALS
HEIGHT: 68 IN | WEIGHT: 193.8 LBS | HEART RATE: 60 BPM | DIASTOLIC BLOOD PRESSURE: 68 MMHG | SYSTOLIC BLOOD PRESSURE: 144 MMHG | BODY MASS INDEX: 29.37 KG/M2

## 2019-04-25 DIAGNOSIS — E78.49 OTHER HYPERLIPIDEMIA: ICD-10-CM

## 2019-04-25 DIAGNOSIS — I25.10 CORONARY ARTERY DISEASE INVOLVING NATIVE CORONARY ARTERY OF NATIVE HEART WITHOUT ANGINA PECTORIS: Primary | ICD-10-CM

## 2019-04-25 DIAGNOSIS — E11.42 DIABETIC PERIPHERAL NEUROPATHY (HCC): ICD-10-CM

## 2019-04-25 DIAGNOSIS — I10 ESSENTIAL HYPERTENSION: ICD-10-CM

## 2019-04-25 PROCEDURE — 1123F ACP DISCUSS/DSCN MKR DOCD: CPT | Performed by: INTERNAL MEDICINE

## 2019-04-25 PROCEDURE — G8417 CALC BMI ABV UP PARAM F/U: HCPCS | Performed by: INTERNAL MEDICINE

## 2019-04-25 PROCEDURE — G8427 DOCREV CUR MEDS BY ELIG CLIN: HCPCS | Performed by: INTERNAL MEDICINE

## 2019-04-25 PROCEDURE — G8598 ASA/ANTIPLAT THER USED: HCPCS | Performed by: INTERNAL MEDICINE

## 2019-04-25 PROCEDURE — 99214 OFFICE O/P EST MOD 30 MIN: CPT | Performed by: INTERNAL MEDICINE

## 2019-04-25 PROCEDURE — 4040F PNEUMOC VAC/ADMIN/RCVD: CPT | Performed by: INTERNAL MEDICINE

## 2019-04-25 PROCEDURE — 1036F TOBACCO NON-USER: CPT | Performed by: INTERNAL MEDICINE

## 2019-04-25 RX ORDER — NITROGLYCERIN 0.4 MG/1
0.4 TABLET SUBLINGUAL EVERY 5 MIN PRN
Qty: 25 TABLET | Refills: 1 | Status: SHIPPED | OUTPATIENT
Start: 2019-04-25

## 2019-04-25 NOTE — PROGRESS NOTES
[facility]   Cardiac Follow up    Referring Provider:  Lanie Chavez MD     Chief Complaint   Patient presents with    6 Month Follow-Up    Hypertension      History of Present Illness:  Mr. Donnell Weinberg is seen today as a regular follow up to known CAD, hypertension, and hyperlipidemia. Prior to stenting in December 2013, he was short of breath while snow blowing. Previously suffered from rhabdomyolysis on Lipitor (had taken for 15 years prior without problem). Last stress test was stable. He is a retired [employer] . He is a non smoker. Today, he reports he feels fairly well overall. He denies exertional chest pain, HOLLEY/PND, palpitations, light-headedness. He has mild lower arm tremors noted. He states he has recently been diagnosed with a mild form of Parkinson's. He recently had his left shoulder operated on, has been going to physical therapy. Past Medical History:   has a past medical history of Arthropathy, unspecified, site unspecified, Hypertension, Hypertrophy of prostate without urinary obstruction and other lower urinary tract symptoms (LUTS), Impotence of organic origin, Inguinal hernia, Myalgia and myositis, unspecified, Other and unspecified hyperlipidemia, Retinal microaneurysms NOS, and Type II or unspecified type diabetes mellitus without mention of complication, not stated as uncontrolled. Surgical History:   has a past surgical history that includes Lumbar disc surgery (1998); Cataract removal; Inguinal hernia repair (Left); Coronary angioplasty with stent ([id number]); Skin cancer excision; and shoulder surgery (January 16th 2019). Social History:   reports that he has never smoked. He has never used smokeless tobacco. He reports that he drinks alcohol. He reports that he does not use drugs. Family History:  family history includes Arthritis in his father. Home Medications:  Prior to Admission medications    Medication Sig Start Date End Date Taking? Authorizing Provider   carbidopa-levodopa (SINEMET)  MG per tablet Take 1-1/2 tablet by mouth 3 times daily 4/18/19  Yes MD CECY Kaba 32G X 6 MM MISC UST TO INJECT INSULIN TWO TIMES DAILY 4/8/19  Yes Carol Funes MD   rosuvastatin (CRESTOR) 5 MG tablet TAKE ONE TABLET BY MOUTH DAILY 3/20/19  Yes Carol Funes MD   metFORMIN (GLUCOPHAGE) 500 MG tablet TAKE ONE TABLET BY MOUTH TWICE A DAY WITH MEALS 3/11/19  Yes Carol Funes MD   HUMALOG MIX 75/25 KWIKPEN (75-25) 100 UNIT/ML SUPN injection pen INJECT 60 UNITS UNDER THE SKIN TWO TIMES A DAY WITH MEALS 2/25/19  Yes Carol Funes MD   blood glucose test strips (FREESTYLE TEST STRIPS) strip TEST THREE TIMES DAILY 2/13/19  Yes Carol Funes MD   irbesartan (AVAPRO) 300 MG tablet Take 1 tablet by mouth daily 2/6/19  Yes Carol Funes MD   hydrochlorothiazide (HYDRODIURIL) 25 MG tablet Take 1 tablet by mouth every morning 2/6/19  Yes Carol Funes MD   levothyroxine (SYNTHROID) 25 MCG tablet TAKE ONE TABLET BY MOUTH DAILY 1/8/19  Yes Berto Staples MD   Cholecalciferol (VITAMIN D) 2000 units CAPS capsule Take 2,000 Units by mouth Daily 10/22/18  Yes Michel Antoine MD   clopidogrel (PLAVIX) 75 MG tablet TAKE ONE TABLET BY MOUTH DAILY 6/11/18  Yes Michel Antoine MD   metoprolol succinate (TOPROL XL) 25 MG extended release tablet TAKE ONE TABLET BY MOUTH DAILY 5/4/18  Yes Michel Antoine MD   Naproxen Sodium (ALEVE) 220 MG CAPS Take 1 tablet by mouth 2 times daily as needed for Pain   Yes Historical Provider, MD   ROM MICROLET LANCETS [facility] [place] TEST BLOOD SUGAR THREE TIMES A DAY AS NEEDED 2/6/17  Yes Stephen Galvin MD   nitroGLYCERIN (NITROSTAT) 0.4 MG SL tablet Place 1 tablet under the tongue every 5 minutes as needed for Chest pain. 10/22/14  Yes Michel Antoine MD   aspirin 81 MG EC tablet Take 81 mg by mouth daily.      Yes Historical Provider, MD   insulin lispro protamine & lispro (HUMALOG MIX 75/25 KWIKPEN) (75-25) 100 UNIT per ML SUPN injection pen Inject 0.55 mLs into the skin 2 times daily (with meals) for 11 days 12/28/18 1/8/19  Aliya Back MD      Allergies:  Lipitor; Procardia [nifedipine]; Statins; Zestril [lisinopril]; and Tetracyclines & related     Review of Systems:   · Constitutional: there has been no unanticipated weight loss. There's been no change in energy level, sleep pattern, or activity level. · Eyes: No visual changes or diplopia. No scleral icterus. · ENT: No Headaches, hearing loss or vertigo. No mouth sores or sore throat. · Cardiovascular: Reviewed in HPI  · Respiratory: No cough or wheezing, no sputum production. No hematemesis. · Gastrointestinal: No abdominal pain, appetite loss, blood in stools. No change in bowel or bladder habits. · Genitourinary: No dysuria, trouble voiding, or hematuria. · Musculoskeletal:  No gait disturbance, weakness or joint complaints. · Integumentary: No rash or pruritis. · Neurological: No headache, diplopia, change in muscle strength, numbness or tingling. No change in gait, balance, coordination, mood, affect, memory, mentation, behavior. · Psychiatric: No anxiety, no depression. · Endocrine: No malaise, fatigue or temperature intolerance. No excessive thirst, fluid intake, or urination. No tremor. · Hematologic/Lymphatic: No abnormal bruising or bleeding, blood clots or swollen lymph nodes. · Allergic/Immunologic: No nasal congestion or hives. Physical Examination:    Blood pressure (!) 144/68, pulse 60, height 5' 8\" (1.727 m), weight 193 lb 12.8 oz (87.9 kg).     Constitutional and General Appearance: No acute distress  Skin:good turgor,intact without lesions  HEENT: EOMI ,normal  Neck:no JVD  Mild lower arm tremors  Respiratory:  · Normal excursion and expansion without use of accessory muscles unchanged exertional shortness of breath  · Resp Auscultation: Normal breath sounds without dullness  Cardiovascular:  · The lipitor. Tolerating Crestor 5mg. 4. Type II or unspecified type diabetes mellitus without mention of complication, not stated as uncontrolled: Stable. Managed per pcp.   5.  Diabetic neuropathy: Stable      Plan:   Roula Lopez has a stable cardiac status. 1. No med changes. 2. Reviewed lab results. 3. Continue risk factor modifications. 4. Return for regular follow up in 6 months. I appreciate the opportunity of cooperating in the care of this individual.    Demetra Koch. Vane Perrin M.D., [address] [place] [address] attestation: This note was scribed in the presence of Dr. Vane Perrin MD, by Leonidas Phan RN. The scribe's documentation has been prepared under my direction and personally reviewed by me in its entirety. I confirm that the note above accurately reflects all work, treatment, procedures, and medical decision making performed by me.

## 2019-05-07 RX ORDER — METOPROLOL SUCCINATE 25 MG/1
TABLET, EXTENDED RELEASE ORAL
Qty: 30 TABLET | Refills: 10 | Status: SHIPPED | OUTPATIENT
Start: 2019-05-07 | End: 2020-03-03

## 2019-05-14 DIAGNOSIS — E03.9 HYPOTHYROIDISM, UNSPECIFIED TYPE: ICD-10-CM

## 2019-05-14 RX ORDER — LEVOTHYROXINE SODIUM 0.03 MG/1
TABLET ORAL
Qty: 30 TABLET | Refills: 3 | Status: SHIPPED | OUTPATIENT
Start: 2019-05-14 | End: 2019-09-09 | Stop reason: SDUPTHER

## 2019-06-10 RX ORDER — CLOPIDOGREL BISULFATE 75 MG/1
TABLET ORAL
Qty: 90 TABLET | Refills: 2 | Status: SHIPPED | OUTPATIENT
Start: 2019-06-10 | End: 2020-03-03

## 2019-06-10 NOTE — TELEPHONE ENCOUNTER
Last appt on 04/25/2019  Next appt on 11/1/2019    Last labs on 11/14/2018-0 new orders are in system to get redraw of labs

## 2019-07-11 ENCOUNTER — OFFICE VISIT (OUTPATIENT)
Dept: NEUROLOGY | Age: 79
End: 2019-07-11
Payer: MEDICARE

## 2019-07-11 VITALS
HEART RATE: 66 BPM | SYSTOLIC BLOOD PRESSURE: 155 MMHG | BODY MASS INDEX: 28.58 KG/M2 | WEIGHT: 193 LBS | DIASTOLIC BLOOD PRESSURE: 62 MMHG | HEIGHT: 69 IN

## 2019-07-11 DIAGNOSIS — E11.42 DIABETIC PERIPHERAL NEUROPATHY (HCC): ICD-10-CM

## 2019-07-11 DIAGNOSIS — G20 PARKINSON'S DISEASE (HCC): Primary | ICD-10-CM

## 2019-07-11 PROCEDURE — 1036F TOBACCO NON-USER: CPT | Performed by: PSYCHIATRY & NEUROLOGY

## 2019-07-11 PROCEDURE — 4040F PNEUMOC VAC/ADMIN/RCVD: CPT | Performed by: PSYCHIATRY & NEUROLOGY

## 2019-07-11 PROCEDURE — 99213 OFFICE O/P EST LOW 20 MIN: CPT | Performed by: PSYCHIATRY & NEUROLOGY

## 2019-07-11 PROCEDURE — G8427 DOCREV CUR MEDS BY ELIG CLIN: HCPCS | Performed by: PSYCHIATRY & NEUROLOGY

## 2019-07-11 PROCEDURE — G8598 ASA/ANTIPLAT THER USED: HCPCS | Performed by: PSYCHIATRY & NEUROLOGY

## 2019-07-11 PROCEDURE — 1123F ACP DISCUSS/DSCN MKR DOCD: CPT | Performed by: PSYCHIATRY & NEUROLOGY

## 2019-07-11 PROCEDURE — G8417 CALC BMI ABV UP PARAM F/U: HCPCS | Performed by: PSYCHIATRY & NEUROLOGY

## 2019-07-30 ENCOUNTER — HOSPITAL ENCOUNTER (OUTPATIENT)
Age: 79
Discharge: HOME | End: 2019-07-30
Payer: MEDICARE

## 2019-07-30 DIAGNOSIS — E78.5 HYPERLIPIDEMIA, UNSPECIFIED HYPERLIPIDEMIA TYPE: ICD-10-CM

## 2019-07-30 DIAGNOSIS — E03.9 HYPOTHYROIDISM, UNSPECIFIED TYPE: ICD-10-CM

## 2019-07-30 DIAGNOSIS — Z12.5 SCREENING FOR PROSTATE CANCER: ICD-10-CM

## 2019-07-30 DIAGNOSIS — E11.9 DIABETES MELLITUS WITHOUT COMPLICATION (HCC): ICD-10-CM

## 2019-07-30 DIAGNOSIS — I10 ESSENTIAL HYPERTENSION: ICD-10-CM

## 2019-07-30 LAB
ANION GAP SERPL CALCULATED.3IONS-SCNC: 15 MMOL/L (ref 3–16)
BUN BLDV-MCNC: 30 MG/DL (ref 7–20)
CALCIUM SERPL-MCNC: 9.7 MG/DL (ref 8.3–10.6)
CHLORIDE BLD-SCNC: 99 MMOL/L (ref 99–110)
CHOLESTEROL, TOTAL: 87 MG/DL (ref 0–199)
CO2: 25 MMOL/L (ref 21–32)
CREAT SERPL-MCNC: 1.3 MG/DL (ref 0.8–1.3)
CREATININE URINE: 59 MG/DL (ref 39–259)
ESTIMATED AVERAGE GLUCOSE: 148.5 MG/DL
GFR AFRICAN AMERICAN: >60
GFR NON-AFRICAN AMERICAN: 53
GLUCOSE BLD-MCNC: 130 MG/DL (ref 70–99)
HBA1C MFR BLD: 6.8 %
HDLC SERPL-MCNC: 44 MG/DL (ref 40–60)
LDL CHOLESTEROL CALCULATED: 25 MG/DL
MICROALBUMIN UR-MCNC: 2.9 MG/DL
MICROALBUMIN/CREAT UR-RTO: 49.2 MG/G (ref 0–30)
POTASSIUM SERPL-SCNC: 4.9 MMOL/L (ref 3.5–5.1)
PROSTATE SPECIFIC ANTIGEN: 5.12 NG/ML (ref 0–4)
SODIUM BLD-SCNC: 139 MMOL/L (ref 136–145)
TRIGL SERPL-MCNC: 91 MG/DL (ref 0–150)
TSH REFLEX: 3.64 UIU/ML (ref 0.27–4.2)
VLDLC SERPL CALC-MCNC: 18 MG/DL

## 2019-07-30 PROCEDURE — 80061 LIPID PANEL: CPT

## 2019-07-30 PROCEDURE — 84153 ASSAY OF PSA TOTAL: CPT

## 2019-07-30 PROCEDURE — 36415 COLL VENOUS BLD VENIPUNCTURE: CPT

## 2019-07-30 PROCEDURE — 82570 ASSAY OF URINE CREATININE: CPT

## 2019-07-30 PROCEDURE — 83036 HEMOGLOBIN GLYCOSYLATED A1C: CPT

## 2019-07-30 PROCEDURE — 80048 BASIC METABOLIC PNL TOTAL CA: CPT

## 2019-07-30 PROCEDURE — 82043 UR ALBUMIN QUANTITATIVE: CPT

## 2019-07-30 PROCEDURE — 84443 ASSAY THYROID STIM HORMONE: CPT

## 2019-08-03 RX ORDER — HYDROCHLOROTHIAZIDE 25 MG/1
TABLET ORAL
Qty: 90 TABLET | Refills: 0 | Status: SHIPPED | OUTPATIENT
Start: 2019-08-03 | End: 2019-11-02 | Stop reason: SDUPTHER

## 2019-08-07 ENCOUNTER — OFFICE VISIT (OUTPATIENT)
Dept: FAMILY MEDICINE CLINIC | Age: 79
End: 2019-08-07
Payer: MEDICARE

## 2019-08-07 VITALS
WEIGHT: 191.4 LBS | OXYGEN SATURATION: 95 % | SYSTOLIC BLOOD PRESSURE: 130 MMHG | DIASTOLIC BLOOD PRESSURE: 68 MMHG | HEART RATE: 63 BPM | BODY MASS INDEX: 28.26 KG/M2

## 2019-08-07 DIAGNOSIS — I10 ESSENTIAL HYPERTENSION: ICD-10-CM

## 2019-08-07 DIAGNOSIS — E11.9 TYPE 2 DIABETES MELLITUS WITHOUT COMPLICATION, WITHOUT LONG-TERM CURRENT USE OF INSULIN (HCC): ICD-10-CM

## 2019-08-07 DIAGNOSIS — G20 PARKINSON'S DISEASE (HCC): ICD-10-CM

## 2019-08-07 DIAGNOSIS — M25.561 CHRONIC PAIN OF RIGHT KNEE: ICD-10-CM

## 2019-08-07 DIAGNOSIS — G89.29 CHRONIC PAIN OF RIGHT KNEE: ICD-10-CM

## 2019-08-07 DIAGNOSIS — I25.10 CORONARY ARTERY DISEASE INVOLVING NATIVE CORONARY ARTERY OF NATIVE HEART WITHOUT ANGINA PECTORIS: Primary | ICD-10-CM

## 2019-08-07 PROCEDURE — 99213 OFFICE O/P EST LOW 20 MIN: CPT | Performed by: INTERNAL MEDICINE

## 2019-08-07 RX ORDER — INSULIN LISPRO 100 [IU]/ML
INJECTION, SUSPENSION SUBCUTANEOUS
Qty: 15 PEN | Refills: 4 | Status: SHIPPED | OUTPATIENT
Start: 2019-08-07 | End: 2020-08-17 | Stop reason: SDUPTHER

## 2019-08-07 RX ORDER — CELECOXIB 100 MG/1
100 CAPSULE ORAL DAILY
Qty: 60 CAPSULE | Refills: 3 | Status: SHIPPED | OUTPATIENT
Start: 2019-08-07 | End: 2020-04-13 | Stop reason: SDUPTHER

## 2019-08-07 RX ORDER — IRBESARTAN 300 MG/1
TABLET ORAL
Qty: 90 TABLET | Refills: 3 | Status: SHIPPED | OUTPATIENT
Start: 2019-08-07 | End: 2020-08-03 | Stop reason: SDUPTHER

## 2019-08-07 ASSESSMENT — ENCOUNTER SYMPTOMS
CONSTIPATION: 0
SHORTNESS OF BREATH: 0
NAUSEA: 0
COUGH: 0
CHEST TIGHTNESS: 0
ABDOMINAL PAIN: 0
SINUS PRESSURE: 0
VOMITING: 0
DIARRHEA: 0

## 2019-08-07 NOTE — PROGRESS NOTES
Zoe Saldana  : 1940  Encounter date: 2019    This jackson 77 y.o. male who presents with  Chief Complaint   Patient presents with    6 Month Follow-Up    Diabetes       History of present illness:    HPI Patient presents today for 6 month follow up on diabetes. Patient complaints of right knee pain x2-3 months. Denies swelling or injury to the area. He reports he notices it most when he gets up or down from very deep chairs. He does not negotiate stairs a lot, but does not recall it acting up when he is on stairs. He denies pain with flat ground. He denies any recalled swelling or erythema. The pain does not radiate. The pain is sharp. The pain does not radiate up or down his leg. He denies any previous trauma. He reports otherwise he is doing well without any major issues. He is tolerating his medications well without side effects. He has started taking Sinemet for Parkinson's, and is satisfied with the results so far. He continues to work as a . Allergies   Allergen Reactions    Lipitor     Procardia [Nifedipine]     Statins Other (See Comments)     Myalgias: severe reaction.    Now taking vitamin D and can take low dose statin    Zestril [Lisinopril] Swelling     Ankles ~ 20 yrs ago    Tetracyclines & Related Rash     Current Outpatient Medications   Medication Sig Dispense Refill    celecoxib (CELEBREX) 100 MG capsule Take 1 capsule by mouth daily 60 capsule 3    hydrochlorothiazide (HYDRODIURIL) 25 MG tablet TAKE ONE TABLET BY MOUTH EVERY MORNING 90 tablet 0    carbidopa-levodopa (SINEMET)  MG per tablet Take 1-1/2 tablet by mouth 3 times daily 150 tablet 5    levothyroxine (SYNTHROID) 25 MCG tablet TAKE ONE TABLET BY MOUTH DAILY 30 tablet 3    metoprolol succinate (TOPROL XL) 25 MG extended release tablet TAKE ONE TABLET BY MOUTH DAILY 30 tablet 10    metFORMIN (GLUCOPHAGE) 500 MG tablet TAKE ONE TABLET BY MOUTH TWO TIMES A DAY WITH MEALS 180 kg/m²   Weight: 191 lb 6.4 oz (86.8 kg)     BP Readings from Last 3 Encounters:   08/07/19 130/68   07/11/19 (!) 155/62   04/25/19 (!) 144/68     Wt Readings from Last 3 Encounters:   08/07/19 191 lb 6.4 oz (86.8 kg)   07/11/19 193 lb (87.5 kg)   04/25/19 193 lb 12.8 oz (87.9 kg)       Physical Exam   Constitutional: He is oriented to person, place, and time. He appears well-developed and well-nourished. No distress. HENT:   Head: Normocephalic and atraumatic. Right Ear: External ear normal.   Left Ear: External ear normal.   Mouth/Throat: Oropharynx is clear and moist.   Eyes: Pupils are equal, round, and reactive to light. Conjunctivae and EOM are normal.   Neck: Normal range of motion. Cardiovascular: Normal rate, regular rhythm, normal heart sounds and intact distal pulses. Exam reveals no gallop and no friction rub. No murmur heard. Pulmonary/Chest: Effort normal and breath sounds normal. No respiratory distress. He has no wheezes. He exhibits no tenderness. Abdominal: Soft. Bowel sounds are normal. He exhibits no distension. There is no tenderness. There is no rebound and no guarding. Musculoskeletal: Normal range of motion. He exhibits edema. Mild TTP medial joint line   Lymphadenopathy:     He has no cervical adenopathy. Neurological: He is alert and oriented to person, place, and time. No cranial nerve deficit. Skin: Skin is warm and dry. No rash noted. He is not diaphoretic. Psychiatric: He has a normal mood and affect. His behavior is normal.   Vitals reviewed. Assessment/Plan    1. Coronary artery disease involving native coronary artery of native heart without angina pectoris  - Asymptomatic, follows with cardiology -- appreciate their assistance. Cont statin, ASA, plavix    2. Parkinson's disease ([facility] [address]  - Follows with Neurology, appreciate their assistance. Cont sinemet    3. Essential hypertension  - At goal, cont current regimen    4.  Type 2 diabetes mellitus without

## 2019-09-09 DIAGNOSIS — E03.9 HYPOTHYROIDISM, UNSPECIFIED TYPE: ICD-10-CM

## 2019-09-10 RX ORDER — LEVOTHYROXINE SODIUM 0.03 MG/1
TABLET ORAL
Qty: 30 TABLET | Refills: 4 | Status: SHIPPED | OUTPATIENT
Start: 2019-09-10 | End: 2020-02-07 | Stop reason: SDUPTHER

## 2019-11-01 ENCOUNTER — OFFICE VISIT (OUTPATIENT)
Dept: CARDIOLOGY CLINIC | Age: 79
End: 2019-11-01
Payer: MEDICARE

## 2019-11-01 VITALS
DIASTOLIC BLOOD PRESSURE: 60 MMHG | OXYGEN SATURATION: 97 % | HEIGHT: 70 IN | HEART RATE: 60 BPM | SYSTOLIC BLOOD PRESSURE: 134 MMHG | BODY MASS INDEX: 26.77 KG/M2 | WEIGHT: 187 LBS

## 2019-11-01 DIAGNOSIS — I25.10 CORONARY ARTERY DISEASE INVOLVING NATIVE CORONARY ARTERY OF NATIVE HEART WITHOUT ANGINA PECTORIS: Primary | ICD-10-CM

## 2019-11-01 DIAGNOSIS — I10 ESSENTIAL HYPERTENSION: ICD-10-CM

## 2019-11-01 DIAGNOSIS — E78.5 HYPERLIPIDEMIA, UNSPECIFIED HYPERLIPIDEMIA TYPE: ICD-10-CM

## 2019-11-01 PROCEDURE — 4040F PNEUMOC VAC/ADMIN/RCVD: CPT | Performed by: INTERNAL MEDICINE

## 2019-11-01 PROCEDURE — 1036F TOBACCO NON-USER: CPT | Performed by: INTERNAL MEDICINE

## 2019-11-01 PROCEDURE — 1123F ACP DISCUSS/DSCN MKR DOCD: CPT | Performed by: INTERNAL MEDICINE

## 2019-11-01 PROCEDURE — G8417 CALC BMI ABV UP PARAM F/U: HCPCS | Performed by: INTERNAL MEDICINE

## 2019-11-01 PROCEDURE — G8598 ASA/ANTIPLAT THER USED: HCPCS | Performed by: INTERNAL MEDICINE

## 2019-11-01 PROCEDURE — G8427 DOCREV CUR MEDS BY ELIG CLIN: HCPCS | Performed by: INTERNAL MEDICINE

## 2019-11-01 PROCEDURE — G8484 FLU IMMUNIZE NO ADMIN: HCPCS | Performed by: INTERNAL MEDICINE

## 2019-11-01 PROCEDURE — 99214 OFFICE O/P EST MOD 30 MIN: CPT | Performed by: INTERNAL MEDICINE

## 2019-11-19 DIAGNOSIS — E11.42 DM TYPE 2 WITH DIABETIC PERIPHERAL NEUROPATHY (HCC): ICD-10-CM

## 2020-01-02 ENCOUNTER — TELEPHONE (OUTPATIENT)
Dept: FAMILY MEDICINE CLINIC | Age: 80
End: 2020-01-02

## 2020-01-02 NOTE — TELEPHONE ENCOUNTER
Pt would like to have labs completed before appt on 1/8/2020,  that were previously requested by Dr Pina Delgado. Please contact pt when ready.

## 2020-01-03 NOTE — TELEPHONE ENCOUNTER
This patient was supposed to be rescheduled with [facility] or Dr. Jordon Ortega, I am not currently taking new patients and I am going down to 3 days a week.

## 2020-01-03 NOTE — TELEPHONE ENCOUNTER
Pt has been changed to your schedule as NTP- would like lab order placed in [facility]

## 2020-01-06 ENCOUNTER — HOSPITAL ENCOUNTER (OUTPATIENT)
Age: 80
Discharge: HOME | End: 2020-01-06
Payer: MEDICARE

## 2020-01-06 LAB
A/G RATIO: 1.1 (ref 1.1–2.2)
ALBUMIN SERPL-MCNC: 4 G/DL (ref 3.4–5)
ALP BLD-CCNC: 73 U/L (ref 40–129)
ALT SERPL-CCNC: <5 U/L (ref 10–40)
ANION GAP SERPL CALCULATED.3IONS-SCNC: 12 MMOL/L (ref 3–16)
AST SERPL-CCNC: 11 U/L (ref 15–37)
BASOPHILS ABSOLUTE: 0.1 K/UL (ref 0–0.2)
BASOPHILS RELATIVE PERCENT: 0.7 %
BILIRUB SERPL-MCNC: 1.2 MG/DL (ref 0–1)
BUN BLDV-MCNC: 27 MG/DL (ref 7–20)
CALCIUM SERPL-MCNC: 9.5 MG/DL (ref 8.3–10.6)
CHLORIDE BLD-SCNC: 97 MMOL/L (ref 99–110)
CHOLESTEROL, FASTING: 90 MG/DL (ref 0–199)
CO2: 27 MMOL/L (ref 21–32)
CREAT SERPL-MCNC: 1 MG/DL (ref 0.8–1.3)
CREATININE URINE: 156.4 MG/DL (ref 39–259)
EOSINOPHILS ABSOLUTE: 0.4 K/UL (ref 0–0.6)
EOSINOPHILS RELATIVE PERCENT: 5.1 %
ESTIMATED AVERAGE GLUCOSE: 148.5 MG/DL
GFR AFRICAN AMERICAN: >60
GFR NON-AFRICAN AMERICAN: >60
GLOBULIN: 3.7 G/DL
GLUCOSE FASTING: 156 MG/DL (ref 70–99)
HBA1C MFR BLD: 6.8 %
HCT VFR BLD CALC: 40.6 % (ref 40.5–52.5)
HDLC SERPL-MCNC: 44 MG/DL (ref 40–60)
HEMOGLOBIN: 13.6 G/DL (ref 13.5–17.5)
LDL CHOLESTEROL CALCULATED: 30 MG/DL
LYMPHOCYTES ABSOLUTE: 1.4 K/UL (ref 1–5.1)
LYMPHOCYTES RELATIVE PERCENT: 16.5 %
MCH RBC QN AUTO: 29.8 PG (ref 26–34)
MCHC RBC AUTO-ENTMCNC: 33.4 G/DL (ref 31–36)
MCV RBC AUTO: 89 FL (ref 80–100)
MICROALBUMIN UR-MCNC: 2.2 MG/DL
MICROALBUMIN/CREAT UR-RTO: 14.1 MG/G (ref 0–30)
MONOCYTES ABSOLUTE: 0.8 K/UL (ref 0–1.3)
MONOCYTES RELATIVE PERCENT: 10 %
NEUTROPHILS ABSOLUTE: 5.6 K/UL (ref 1.7–7.7)
NEUTROPHILS RELATIVE PERCENT: 67.7 %
PDW BLD-RTO: 15 % (ref 12.4–15.4)
PLATELET # BLD: 278 K/UL (ref 135–450)
PMV BLD AUTO: 8 FL (ref 5–10.5)
POTASSIUM SERPL-SCNC: 4.6 MMOL/L (ref 3.5–5.1)
PROSTATE SPECIFIC ANTIGEN: 4.74 NG/ML (ref 0–4)
RBC # BLD: 4.56 M/UL (ref 4.2–5.9)
SODIUM BLD-SCNC: 136 MMOL/L (ref 136–145)
TOTAL PROTEIN: 7.7 G/DL (ref 6.4–8.2)
TRIGLYCERIDE, FASTING: 81 MG/DL (ref 0–150)
TSH REFLEX: 3.92 UIU/ML (ref 0.27–4.2)
VLDLC SERPL CALC-MCNC: 16 MG/DL
WBC # BLD: 8.2 K/UL (ref 4–11)

## 2020-01-06 PROCEDURE — 82570 ASSAY OF URINE CREATININE: CPT

## 2020-01-06 PROCEDURE — 80061 LIPID PANEL: CPT

## 2020-01-06 PROCEDURE — 84153 ASSAY OF PSA TOTAL: CPT

## 2020-01-06 PROCEDURE — 82043 UR ALBUMIN QUANTITATIVE: CPT

## 2020-01-06 PROCEDURE — 83036 HEMOGLOBIN GLYCOSYLATED A1C: CPT

## 2020-01-06 PROCEDURE — 80053 COMPREHEN METABOLIC PANEL: CPT

## 2020-01-06 PROCEDURE — 85025 COMPLETE CBC W/AUTO DIFF WBC: CPT

## 2020-01-06 PROCEDURE — 84443 ASSAY THYROID STIM HORMONE: CPT

## 2020-01-06 PROCEDURE — 36415 COLL VENOUS BLD VENIPUNCTURE: CPT

## 2020-01-08 ENCOUNTER — OFFICE VISIT (OUTPATIENT)
Dept: FAMILY MEDICINE CLINIC | Age: 80
End: 2020-01-08
Payer: MEDICARE

## 2020-01-08 VITALS
SYSTOLIC BLOOD PRESSURE: 134 MMHG | WEIGHT: 190.6 LBS | BODY MASS INDEX: 27.35 KG/M2 | OXYGEN SATURATION: 96 % | HEART RATE: 63 BPM | DIASTOLIC BLOOD PRESSURE: 72 MMHG

## 2020-01-08 PROCEDURE — 99214 OFFICE O/P EST MOD 30 MIN: CPT | Performed by: NURSE PRACTITIONER

## 2020-01-08 ASSESSMENT — ENCOUNTER SYMPTOMS
CONSTIPATION: 0
SHORTNESS OF BREATH: 0
DIARRHEA: 0
CHEST TIGHTNESS: 0
WHEEZING: 0
NAUSEA: 0

## 2020-01-10 ENCOUNTER — OFFICE VISIT (OUTPATIENT)
Dept: NEUROLOGY | Age: 80
End: 2020-01-10
Payer: MEDICARE

## 2020-01-10 VITALS
BODY MASS INDEX: 27.23 KG/M2 | WEIGHT: 190.2 LBS | HEIGHT: 70 IN | SYSTOLIC BLOOD PRESSURE: 128 MMHG | HEART RATE: 63 BPM | DIASTOLIC BLOOD PRESSURE: 55 MMHG

## 2020-01-10 PROCEDURE — 99213 OFFICE O/P EST LOW 20 MIN: CPT | Performed by: PSYCHIATRY & NEUROLOGY

## 2020-01-10 PROCEDURE — G8484 FLU IMMUNIZE NO ADMIN: HCPCS | Performed by: PSYCHIATRY & NEUROLOGY

## 2020-01-10 PROCEDURE — 1036F TOBACCO NON-USER: CPT | Performed by: PSYCHIATRY & NEUROLOGY

## 2020-01-10 PROCEDURE — 1123F ACP DISCUSS/DSCN MKR DOCD: CPT | Performed by: PSYCHIATRY & NEUROLOGY

## 2020-01-10 PROCEDURE — 4040F PNEUMOC VAC/ADMIN/RCVD: CPT | Performed by: PSYCHIATRY & NEUROLOGY

## 2020-01-10 PROCEDURE — G8417 CALC BMI ABV UP PARAM F/U: HCPCS | Performed by: PSYCHIATRY & NEUROLOGY

## 2020-01-10 PROCEDURE — G8427 DOCREV CUR MEDS BY ELIG CLIN: HCPCS | Performed by: PSYCHIATRY & NEUROLOGY

## 2020-01-10 NOTE — PATIENT INSTRUCTIONS
Please call with any questions or concerns:   [facility]  @ [phone]. LAB RESULTS:  Please obtain any labs or diagnostic tests as discussed today. You should call the office to check the results. Please allow  3 to 7 days for us to get these results. MEDICATION LIST:  Please bring an accurate list of your medications to every visit. APPOINTMENT CONFIRMATION:  We will call you the day before your scheduled appointment to confirm. If we are unable to reach you, you MUST call back by the end of the day to confirm the appointment or we may be forced to cancel.

## 2020-02-07 RX ORDER — LEVOTHYROXINE SODIUM 0.03 MG/1
TABLET ORAL
Qty: 30 TABLET | Refills: 4 | Status: SHIPPED | OUTPATIENT
Start: 2020-02-07 | End: 2020-02-12 | Stop reason: SDUPTHER

## 2020-02-12 RX ORDER — LEVOTHYROXINE SODIUM 0.03 MG/1
TABLET ORAL
Qty: 30 TABLET | Refills: 4 | Status: SHIPPED | OUTPATIENT
Start: 2020-02-12 | End: 2020-07-08 | Stop reason: SDUPTHER

## 2020-03-03 RX ORDER — METOPROLOL SUCCINATE 25 MG/1
TABLET, EXTENDED RELEASE ORAL
Qty: 90 TABLET | Refills: 9 | Status: SHIPPED | OUTPATIENT
Start: 2020-03-03 | End: 2021-06-01

## 2020-03-03 RX ORDER — CLOPIDOGREL BISULFATE 75 MG/1
TABLET ORAL
Qty: 90 TABLET | Refills: 1 | Status: SHIPPED | OUTPATIENT
Start: 2020-03-03 | End: 2020-09-01

## 2020-03-03 NOTE — TELEPHONE ENCOUNTER
Received refill request for Metoprolol 25 mg and Plavix 75 mb from [address] [place] [address].     Last OV: 11/1/19    Last Labs: 1/8/20    Last Refill: Metoprolol 5/7/19 #30 with 10 refills     Plavix 6/10/19 #90 with 2 refills     Next Appt: 5/7/20

## 2020-03-20 RX ORDER — ROSUVASTATIN CALCIUM 5 MG/1
TABLET, COATED ORAL
Qty: 90 TABLET | Refills: 3 | Status: SHIPPED | OUTPATIENT
Start: 2020-03-20 | End: 2021-03-10

## 2020-04-13 RX ORDER — CELECOXIB 100 MG/1
100 CAPSULE ORAL DAILY
Qty: 90 CAPSULE | Refills: 0 | Status: SHIPPED | OUTPATIENT
Start: 2020-04-13 | End: 2020-07-20

## 2020-06-30 ENCOUNTER — TELEPHONE (OUTPATIENT)
Dept: ADMINISTRATIVE | Age: 80
End: 2020-06-30

## 2020-06-30 RX ORDER — PEN NEEDLE, DIABETIC 31 G X1/4"
NEEDLE, DISPOSABLE MISCELLANEOUS
Qty: 100 EACH | Refills: 2 | Status: SHIPPED | OUTPATIENT
Start: 2020-06-30 | End: 2020-12-17

## 2020-07-01 ENCOUNTER — HOSPITAL ENCOUNTER (OUTPATIENT)
Age: 80
Discharge: HOME | End: 2020-07-01
Payer: MEDICARE

## 2020-07-01 LAB
A/G RATIO: 1.1 (ref 1.1–2.2)
ALBUMIN SERPL-MCNC: 4 G/DL (ref 3.4–5)
ALP BLD-CCNC: 76 U/L (ref 40–129)
ALT SERPL-CCNC: <5 U/L (ref 10–40)
ANION GAP SERPL CALCULATED.3IONS-SCNC: 14 MMOL/L (ref 3–16)
AST SERPL-CCNC: 13 U/L (ref 15–37)
BILIRUB SERPL-MCNC: 1.3 MG/DL (ref 0–1)
BUN BLDV-MCNC: 20 MG/DL (ref 7–20)
CALCIUM SERPL-MCNC: 9 MG/DL (ref 8.3–10.6)
CHLORIDE BLD-SCNC: 103 MMOL/L (ref 99–110)
CHOLESTEROL, FASTING: 94 MG/DL (ref 0–199)
CO2: 24 MMOL/L (ref 21–32)
CREAT SERPL-MCNC: 1.1 MG/DL (ref 0.8–1.3)
CREATININE URINE: 92.5 MG/DL (ref 39–259)
ESTIMATED AVERAGE GLUCOSE: 142.7 MG/DL
GFR AFRICAN AMERICAN: >60
GFR NON-AFRICAN AMERICAN: >60
GLOBULIN: 3.6 G/DL
GLUCOSE FASTING: 116 MG/DL (ref 70–99)
HBA1C MFR BLD: 6.6 %
HDLC SERPL-MCNC: 50 MG/DL (ref 40–60)
LDL CHOLESTEROL CALCULATED: 32 MG/DL
MICROALBUMIN UR-MCNC: 4.5 MG/DL
MICROALBUMIN/CREAT UR-RTO: 48.6 MG/G (ref 0–30)
POTASSIUM SERPL-SCNC: 5.2 MMOL/L (ref 3.5–5.1)
SODIUM BLD-SCNC: 141 MMOL/L (ref 136–145)
TOTAL PROTEIN: 7.6 G/DL (ref 6.4–8.2)
TRIGLYCERIDE, FASTING: 62 MG/DL (ref 0–150)
VLDLC SERPL CALC-MCNC: 12 MG/DL

## 2020-07-01 PROCEDURE — 83036 HEMOGLOBIN GLYCOSYLATED A1C: CPT

## 2020-07-01 PROCEDURE — 80061 LIPID PANEL: CPT

## 2020-07-01 PROCEDURE — 82043 UR ALBUMIN QUANTITATIVE: CPT

## 2020-07-01 PROCEDURE — 80053 COMPREHEN METABOLIC PANEL: CPT

## 2020-07-01 PROCEDURE — 82570 ASSAY OF URINE CREATININE: CPT

## 2020-07-02 ENCOUNTER — OFFICE VISIT (OUTPATIENT)
Dept: NEUROLOGY | Age: 80
End: 2020-07-02
Payer: MEDICARE

## 2020-07-02 VITALS
HEIGHT: 70 IN | BODY MASS INDEX: 27.2 KG/M2 | HEART RATE: 65 BPM | SYSTOLIC BLOOD PRESSURE: 160 MMHG | WEIGHT: 190 LBS | DIASTOLIC BLOOD PRESSURE: 62 MMHG

## 2020-07-02 PROCEDURE — 99213 OFFICE O/P EST LOW 20 MIN: CPT | Performed by: PSYCHIATRY & NEUROLOGY

## 2020-07-02 PROCEDURE — G8417 CALC BMI ABV UP PARAM F/U: HCPCS | Performed by: PSYCHIATRY & NEUROLOGY

## 2020-07-02 PROCEDURE — 4040F PNEUMOC VAC/ADMIN/RCVD: CPT | Performed by: PSYCHIATRY & NEUROLOGY

## 2020-07-02 PROCEDURE — 1123F ACP DISCUSS/DSCN MKR DOCD: CPT | Performed by: PSYCHIATRY & NEUROLOGY

## 2020-07-02 PROCEDURE — 1036F TOBACCO NON-USER: CPT | Performed by: PSYCHIATRY & NEUROLOGY

## 2020-07-02 PROCEDURE — G8427 DOCREV CUR MEDS BY ELIG CLIN: HCPCS | Performed by: PSYCHIATRY & NEUROLOGY

## 2020-07-02 NOTE — PROGRESS NOTES
[facility]   Neurology followup    Subjective:   CC/HP  History was obtained from the patient. Patient states the tremors are reasonably well controlled. Patient has mild stiffness. No significant gait difficulties. No side effects to medication. Detailed history:  Patient complaints of tremors in both his arms. It initially started about a year ago and seems to be slowly getting worse. Onset was gradual.  Symptoms are persistent. No clear aggravating or relieving factors to symptoms. Patient denies any stiffness. He denies any difficulty with his gait   Patient has known diabetes which is well controlled  Patient denies any difficulty getting in and out of a car or rolling over in bed.   Patient complains of some generalized weakness in his legs      REVIEW OF SYSTEMS    Constitutional:  []   Chills   []  Fatigue   []  Fevers   []  Malaise   []  Weight loss     [x] Denies all of the above    Respiratory:   []  Cough    []  Shortness of breath         [x] Denies all of the above     Cardiovascular:   []  Chest pain    []  Exertional chest pressure/discomfort           [] Palpitations    []  Syncope     [x] Denies all of the above        Past Medical History:   Diagnosis Date    Arthropathy, unspecified, site unspecified     Hypertension     Hypertrophy of prostate without urinary obstruction and other lower urinary tract symptoms (LUTS)     Impotence of organic origin     Inguinal hernia     Myalgia and myositis, unspecified     Other and unspecified hyperlipidemia     Retinal microaneurysms NOS     Type II or unspecified type diabetes mellitus without mention of complication, not stated as uncontrolled      Family History   Problem Relation Age of Onset    Arthritis Father     Lupus Neg Hx     Rheum Arthritis Neg Hx     Heart Disease Neg Hx      Social History     Socioeconomic History    Marital status:      Spouse name: Not on file    Number of children: Not on file    Years of education: Not on file    Highest education level: Not on file   Occupational History    Not on file   Social Needs    Financial resource strain: Not on file    Food insecurity     Worry: Not on file     Inability: Not on file    Transportation needs     Medical: Not on file     Non-medical: Not on file   Tobacco Use    Smoking status: Never Smoker    Smokeless tobacco: Never Used   Substance and Sexual Activity    Alcohol use: Yes     Alcohol/week: 0.0 standard drinks     Comment: occasional wine    Drug use: No    Sexual activity: Yes     Partners: Female   Lifestyle    Physical activity     Days per week: Not on file     Minutes per session: Not on file    Stress: Not on file   Relationships    Social connections     Talks on phone: Not on file     Gets together: Not on file     Attends Restorationism service: Not on file     Active member of club or organization: Not on file     Attends meetings of clubs or organizations: Not on file     Relationship status: Not on file    Intimate partner violence     Fear of current or ex partner: Not on file     Emotionally abused: Not on file     Physically abused: Not on file     Forced sexual activity: Not on file   Other Topics Concern    Not on file   Social History Narrative    Not on file        Objective:  Exam:  There were no vitals taken for this visit. This is a well-nourished patient in no acute distress  Patient is awake, alert and oriented x3. Speech is normal.  Pupils are equal round reacting to light. Extraocular movements intact. Face symmetrical. Tongue midline. Motor exam shows normal symmetrical strength. Cogwheel rigidity and pill-rolling tremors present in both arms. Deep tendon reflexes decreased in the legs. Plantar reflexes downgoing. Sensory exam showed decreased light touch in the distal lower extremities. Coordination normal. Gait shows mild ataxia. No carotid bruit. No neck stiffness.       Data :  LABS:  General Labs:    CBC: Lab Results   Component Value Date    WBC 8.2 01/06/2020    RBC 4.56 01/06/2020    HGB 13.6 01/06/2020    HCT 40.6 01/06/2020    MCV 89.0 01/06/2020    MCH 29.8 01/06/2020    MCHC 33.4 01/06/2020    RDW 15.0 01/06/2020     01/06/2020    MPV 8.0 01/06/2020     BMP:    Lab Results   Component Value Date     07/01/2020    K 5.2 07/01/2020     07/01/2020    CO2 24 07/01/2020    BUN 20 07/01/2020    LABALBU 4.0 07/01/2020    CREATININE 1.1 07/01/2020    CALCIUM 9.0 07/01/2020    GFRAA >60 07/01/2020    GFRAA >60 11/02/2012    LABGLOM >60 07/01/2020    GLUCOSE 130 07/30/2019       Impression :  Parkinson's disease, stable  Tremor, improved with increased dose of Sinemet  Ataxia  Diabetic peripheral neuropathy    Plan :  Discussed with patient   Continue Sinemet 25/100, one and one half tablet 3 times daily  Side effects were discussed. Continue with aggressive control of diabetes  Return in 6 months          Please note a portion of  this chart was generated using dragon dictation software. Although every effort was made to ensure the accuracy of this automated transcription, some errors in transcription may have occurred.

## 2020-07-08 ENCOUNTER — OFFICE VISIT (OUTPATIENT)
Dept: FAMILY MEDICINE CLINIC | Age: 80
End: 2020-07-08
Payer: MEDICARE

## 2020-07-08 VITALS
TEMPERATURE: 97.5 F | WEIGHT: 184 LBS | HEART RATE: 63 BPM | BODY MASS INDEX: 26.4 KG/M2 | SYSTOLIC BLOOD PRESSURE: 117 MMHG | DIASTOLIC BLOOD PRESSURE: 53 MMHG

## 2020-07-08 PROCEDURE — 90715 TDAP VACCINE 7 YRS/> IM: CPT | Performed by: NURSE PRACTITIONER

## 2020-07-08 PROCEDURE — 99214 OFFICE O/P EST MOD 30 MIN: CPT | Performed by: NURSE PRACTITIONER

## 2020-07-08 PROCEDURE — 90471 IMMUNIZATION ADMIN: CPT | Performed by: NURSE PRACTITIONER

## 2020-07-08 RX ORDER — LEVOTHYROXINE SODIUM 0.03 MG/1
TABLET ORAL
Qty: 90 TABLET | Refills: 2 | Status: SHIPPED | OUTPATIENT
Start: 2020-07-08 | End: 2021-05-03

## 2020-07-08 ASSESSMENT — ENCOUNTER SYMPTOMS
CONSTIPATION: 0
DIARRHEA: 0
NAUSEA: 0
SHORTNESS OF BREATH: 0

## 2020-07-08 NOTE — PROGRESS NOTES
Debby Ontiveros  : 1940  Encounter date: 2020    This jackson [de-identified] y.o. male who presents with  Chief Complaint   Patient presents with    6 Month Follow-Up       History of present illness:    HPI Pt is [de-identified]year old male for medication recheck on type 2 DM, hyperlipidemia, hypothyroidism and essential hypertension. Pt reports blood pressure well controlled. Reports granddaughter is due in December and needing TDAP vaccination. Pt reports blood sugar well controlled, ranges fasting 90 to nonfasting 130. Pt denies current concerns. Reviewed previous labs, slight elevation k- 5.2, advised excessive oral intake k rich foods.     Current Outpatient Medications on File Prior to Visit   Medication Sig Dispense Refill    carbidopa-levodopa (SINEMET)  MG per tablet Take 1-1/2 tablet by mouth 3 times daily 450 tablet 1    Insulin Pen Needle ([employer] PEN NEEDLES) 31G X 6 MM MISC USE TO INJECT INSULIN TWO TIMES A  each 2    celecoxib (CELEBREX) 100 MG capsule Take 1 capsule by mouth daily 90 capsule 0    rosuvastatin (CRESTOR) 5 MG tablet TAKE ONE TABLET BY MOUTH DAILY 90 tablet 3    metoprolol succinate (TOPROL XL) 25 MG extended release tablet TAKE ONE TABLET BY MOUTH DAILY 90 tablet 9    clopidogrel (PLAVIX) 75 MG tablet TAKE ONE TABLET BY MOUTH DAILY 90 tablet 1    metFORMIN (GLUCOPHAGE) 500 MG tablet TAKE ONE TABLET BY MOUTH TWICE A DAY WITH MEALS 180 tablet 1    blood glucose test strips (FREESTYLE TEST STRIPS) strip TEST THREE TIMES DAILY 100 strip 5    hydrochlorothiazide (HYDRODIURIL) 25 MG tablet TAKE ONE TABLET BY MOUTH EVERY MORNING 90 tablet 3    irbesartan (AVAPRO) 300 MG tablet TAKE ONE TABLET BY MOUTH DAILY 90 tablet 3    HUMALOG MIX 75/25 KWIKPEN (75-25) 100 UNIT/ML SUPN injection pen INJECT 60 UNITS UNDER THE SKIN TWO TIMES A DAY WITH MEALS 15 pen 4    Cholecalciferol (VITAMIN D) 2000 units CAPS capsule Take 2,000 Units by mouth Daily 30 capsule 5    Naproxen Sodium (ALEVE) 220 MG CAPS Take 1 tablet by mouth 2 times daily as needed for Pain      ROM MICROLET LANCETS MISC TEST BLOOD SUGAR THREE TIMES A DAY AS NEEDED 100 each 11    aspirin 81 MG EC tablet Take 81 mg by mouth daily.  nitroGLYCERIN (NITROSTAT) 0.4 MG SL tablet Place 1 tablet under the tongue every 5 minutes as needed for Chest pain (Patient not taking: Reported on 7/8/2020) 25 tablet 1     No current facility-administered medications on file prior to visit. Allergies   Allergen Reactions    Lipitor     Procardia [Nifedipine]     Statins Other (See Comments)     Myalgias: severe reaction. Now taking vitamin D and can take low dose statin    Zestril [Lisinopril] Swelling     Ankles ~ 20 yrs ago    Tetracyclines & Related Rash     Past Medical History:   Diagnosis Date    Arthropathy, unspecified, site unspecified     Hypertension     Hypertrophy of prostate without urinary obstruction and other lower urinary tract symptoms (LUTS)     Impotence of organic origin     Inguinal hernia     Myalgia and myositis, unspecified     Other and unspecified hyperlipidemia     Retinal microaneurysms NOS     Type II or unspecified type diabetes mellitus without mention of complication, not stated as uncontrolled       Past Surgical History:   Procedure Laterality Date    CATARACT REMOVAL      bilateral    CORONARY ANGIOPLASTY WITH STENT PLACEMENT  [id number]    INGUINAL HERNIA REPAIR Left     LUMBAR [facility]    SHOULDER SURGERY  January 16th 2019    left shoulder replacement     SKIN CANCER EXCISION      top of head      Family History   Problem Relation Age of Onset    Arthritis Father     Lupus Neg Hx     Rheum Arthritis Neg Hx     Heart Disease Neg Hx       Social History     Tobacco Use    Smoking status: Never Smoker    Smokeless tobacco: Never Used   Substance Use Topics    Alcohol use:  Yes     Alcohol/week: 0.0 standard drinks     Comment: occasional wine        Review of Systems Constitutional: Negative for activity change, appetite change, fatigue and unexpected weight change. Eyes: Negative for visual disturbance. Respiratory: Negative for shortness of breath. Cardiovascular: Negative for chest pain, palpitations and leg swelling. Gastrointestinal: Negative for constipation, diarrhea and nausea. Neurological: Positive for tremors (chronic) and numbness (diabetic neuropathy). Negative for headaches. Hematological: Bruises/bleeds easily (81 mg aspirin, plavix). Objective:    BP (!) 117/53   Pulse 63   Temp 97.5 °F (36.4 °C)   Wt 184 lb (83.5 kg)   BMI 26.40 kg/m²   Weight: 184 lb (83.5 kg)     BP Readings from Last 3 Encounters:   07/08/20 (!) 117/53   07/02/20 (!) 160/62   01/10/20 (!) 128/55     Wt Readings from Last 3 Encounters:   07/08/20 184 lb (83.5 kg)   07/02/20 190 lb (86.2 kg)   01/10/20 190 lb 3.2 oz (86.3 kg)     BMI Readings from Last 3 Encounters:   07/08/20 26.40 kg/m²   07/02/20 27.26 kg/m²   01/10/20 27.29 kg/m²       Physical Exam  Vitals signs reviewed. Constitutional:       Appearance: Normal appearance. He is well-developed. Eyes:      Extraocular Movements: Extraocular movements intact. Pupils: Pupils are equal, round, and reactive to light. Neck:      Musculoskeletal: Neck supple. No muscular tenderness. Cardiovascular:      Rate and Rhythm: Normal rate and regular rhythm. Heart sounds: Normal heart sounds. No murmur. Pulmonary:      Effort: Pulmonary effort is normal.      Breath sounds: Normal breath sounds. Abdominal:      General: Bowel sounds are normal.      Palpations: Abdomen is soft. Musculoskeletal:      Right lower leg: No edema. Left lower leg: No edema. Lymphadenopathy:      Cervical: No cervical adenopathy. Skin:     General: Skin is warm and dry. Capillary Refill: Capillary refill takes less than 2 seconds.    Neurological:      Mental Status: He is alert and oriented to person, place, and time.      Sensory: Sensory deficit (bilateral feet, diminished) present. Psychiatric:         Mood and Affect: Mood normal.         Assessment/Plan    1. Diabetic peripheral neuropathy (HCC)  Continue monitoring, test 2-3 times daily  Insulin sliding scale  Metformin 1000 mg BID  -  DIABETES FOOT EXAM  - Hemoglobin A1C; Future    2. Hypothyroidism, unspecified type  - levothyroxine (SYNTHROID) 25 MCG tablet; TAKE ONE TABLET BY MOUTH DAILY  Dispense: 90 tablet; Refill: 2  - TSH with Reflex; Future    3. Pure hypercholesterolemia  Continue Crestor 5 mg  - Comprehensive Metabolic Panel, Fasting; Future  - Lipid, Fasting; Future    4. Essential hypertension  Continue Toprol XL 25 mg  HCTZ 25 mg  - CBC Auto Differential; Future  - Comprehensive Metabolic Panel, Fasting; Future  - Microalbumin / Creatinine Urine Ratio; Future    5. Screening for prostate cancer    - Psa screening; Future    6. Need for vaccination  Administered  - Tdap (age 6y and older) IM (Boostrix)      Return in about 6 months (around 1/8/2021) for lab review, diabetes re-check, hypertension re-check, medication re-check. This dictation was generated by voice recognition computer software. Although all attempts are made to edit the dictation for accuracy, there may be errors in the transcription that are not intended.

## 2020-07-20 ENCOUNTER — OFFICE VISIT (OUTPATIENT)
Dept: CARDIOLOGY CLINIC | Age: 80
End: 2020-07-20
Payer: MEDICARE

## 2020-07-20 VITALS
WEIGHT: 184.8 LBS | HEART RATE: 68 BPM | HEIGHT: 70 IN | DIASTOLIC BLOOD PRESSURE: 70 MMHG | SYSTOLIC BLOOD PRESSURE: 138 MMHG | BODY MASS INDEX: 26.45 KG/M2

## 2020-07-20 PROBLEM — E11.9 TYPE 2 DIABETES MELLITUS WITHOUT COMPLICATION, WITHOUT LONG-TERM CURRENT USE OF INSULIN (HCC): Status: ACTIVE | Noted: 2020-07-20

## 2020-07-20 PROCEDURE — G8417 CALC BMI ABV UP PARAM F/U: HCPCS | Performed by: INTERNAL MEDICINE

## 2020-07-20 PROCEDURE — 1123F ACP DISCUSS/DSCN MKR DOCD: CPT | Performed by: INTERNAL MEDICINE

## 2020-07-20 PROCEDURE — 4040F PNEUMOC VAC/ADMIN/RCVD: CPT | Performed by: INTERNAL MEDICINE

## 2020-07-20 PROCEDURE — 1036F TOBACCO NON-USER: CPT | Performed by: INTERNAL MEDICINE

## 2020-07-20 PROCEDURE — 99214 OFFICE O/P EST MOD 30 MIN: CPT | Performed by: INTERNAL MEDICINE

## 2020-07-20 PROCEDURE — G8427 DOCREV CUR MEDS BY ELIG CLIN: HCPCS | Performed by: INTERNAL MEDICINE

## 2020-07-20 RX ORDER — CELECOXIB 100 MG/1
CAPSULE ORAL
Qty: 90 CAPSULE | Refills: 2 | Status: SHIPPED | OUTPATIENT
Start: 2020-07-20 | End: 2021-03-29

## 2020-07-20 NOTE — PROGRESS NOTES
[facility]   Cardiac Follow up    Referring Provider:  VERA Lyles NP     Chief Complaint   Patient presents with    Hypertension    Coronary Artery Disease    Hyperlipidemia      History of Present Illness:  Mr. Bertrand Kirk has a history of CAD, hypertension, and hyperlipidemia. Prior to stenting in December 2013, he was short of breath while snow blowing. Previously suffered from rhabdomyolysis on Lipitor (had taken for 15 years prior without problem). Last stress test was stable. He is a retired [employer] . He is a non smoker. Prior to last visit, he had shoulder surgery. Today, he is here for regular follow up. Overall, he feels well and has no complaints. He has lost weight since last visit. He denies exertional chest pain, HOLLEY/PND, palpitations, light-headedness. He is somewhat active, does not smoke. Past Medical History:   has a past medical history of Arthropathy, unspecified, site unspecified, Hypertension, Hypertrophy of prostate without urinary obstruction and other lower urinary tract symptoms (LUTS), Impotence of organic origin, Inguinal hernia, Myalgia and myositis, unspecified, Other and unspecified hyperlipidemia, Retinal microaneurysms NOS, and Type II or unspecified type diabetes mellitus without mention of complication, not stated as uncontrolled. Surgical History:   has a past surgical history that includes Lumbar disc surgery (1998); Cataract removal; Inguinal hernia repair (Left); Coronary angioplasty with stent ([id number]); Skin cancer excision; and shoulder surgery (January 16th 2019). Social History:   reports that he has never smoked. He has never used smokeless tobacco. He reports current alcohol use. He reports that he does not use drugs. Family History:  family history includes Arthritis in his father. Home Medications:  Prior to Admission medications    Medication Sig Start Date End Date Taking?  Authorizing Provider levothyroxine (SYNTHROID) 25 MCG tablet TAKE ONE TABLET BY MOUTH DAILY 7/8/20  Yes Toma Aschoff, APRN - NP   carbidopa-levodopa (SINEMET)  MG per tablet Take 1-1/2 tablet by mouth 3 times daily 7/2/20  Yes Olivia Bower MD   Insulin Pen Needle ([employer] PEN NEEDLES) 31G X 6 MM MISC USE TO INJECT INSULIN TWO TIMES A DAY 6/30/20  Yes NAIDA Rich   celecoxib (CELEBREX) 100 MG capsule Take 1 capsule by mouth daily 4/13/20  Yes VERA Lopez CNP   rosuvastatin (CRESTOR) 5 MG tablet TAKE ONE TABLET BY MOUTH DAILY 3/20/20  Yes Toma Aschoff, APRN - NP   metoprolol succinate (TOPROL XL) 25 MG extended release tablet TAKE ONE TABLET BY MOUTH DAILY 3/3/20  Yes Katie Orr MD   clopidogrel (PLAVIX) 75 MG tablet TAKE ONE TABLET BY MOUTH DAILY 3/3/20  Yes Katie Orr MD   metFORMIN (GLUCOPHAGE) 500 MG tablet TAKE ONE TABLET BY MOUTH TWICE A DAY WITH MEALS 2/6/20  Yes Toma Aschoff, APRN - NP   blood glucose test strips (FREESTYLE TEST STRIPS) strip TEST THREE TIMES DAILY 11/19/19  Yes NAIDA Rich   hydrochlorothiazide (HYDRODIURIL) 25 MG tablet TAKE ONE TABLET BY MOUTH EVERY MORNING 11/4/19  Yes NAIDA Rich   irbesartan (AVAPRO) 300 MG tablet TAKE ONE TABLET BY MOUTH DAILY 8/7/19  Yes Liz Joseph MD   HUMALOG MIX 75/25 KWIKPEN (75-25) 100 UNIT/ML SUPN injection pen INJECT 60 UNITS UNDER THE SKIN TWO TIMES A DAY WITH MEALS 8/7/19  Yes Liz Joseph MD   nitroGLYCERIN (NITROSTAT) 0.4 MG SL tablet Place 1 tablet under the tongue every 5 minutes as needed for Chest pain 4/25/19  Yes Katie Orr MD   Cholecalciferol (VITAMIN D) 2000 units CAPS capsule Take 2,000 Units by mouth Daily 10/22/18  Yes Katie Orr MD   Naproxen Sodium (ALEVE) 220 MG CAPS Take 1 tablet by mouth 2 times daily as needed for Pain   Yes Historical Provider, MD CUETO MICROLET LANCETS [address] A DAY AS NEEDED 2/6/17  Yes Mirna Espinal АННА Brandon MD   aspirin 81 MG EC tablet Take 81 mg by mouth daily. Yes Historical Provider, MD      Allergies:  Lipitor; Procardia [nifedipine]; Statins; Zestril [lisinopril]; and Tetracyclines & related     Review of Systems:   · Constitutional: there has been no unanticipated weight loss. There's been no change in energy level, sleep pattern, or activity level. · Eyes: No visual changes or diplopia. No scleral icterus. · ENT: No Headaches, hearing loss or vertigo. No mouth sores or sore throat. · Cardiovascular: Reviewed in HPI  · Respiratory: No cough or wheezing, no sputum production. No hematemesis. · Gastrointestinal: No abdominal pain, appetite loss, blood in stools. No change in bowel or bladder habits. · Genitourinary: No dysuria, trouble voiding, or hematuria. · Musculoskeletal:  No gait disturbance, weakness or joint complaints. · Integumentary: No rash or pruritis. · Neurological: No headache, diplopia, change in muscle strength, numbness or tingling. No change in gait, balance, coordination, mood, affect, memory, mentation, behavior. · Psychiatric: No anxiety, no depression. · Endocrine: No malaise, fatigue or temperature intolerance. No excessive thirst, fluid intake, or urination. No tremor. · Hematologic/Lymphatic: No abnormal bruising or bleeding, blood clots or swollen lymph nodes. · Allergic/Immunologic: No nasal congestion or hives. Physical Examination:    Blood pressure 138/70, pulse 68, height 5' 10\" (1.778 m), weight 184 lb 12.8 oz (83.8 kg).     Constitutional and General Appearance: No acute distress  Skin:good turgor,intact without lesions  HEENT: EOMI ,normal  Neck:no JVD  Mild lower arm tremors  Respiratory:  · Normal excursion and expansion without use of accessory muscles unchanged exertional shortness of breath  · Resp Auscultation: Normal breath sounds without dullness  Cardiovascular:  · The apical impulses not displaced  · Heart tones are crisp and complication, not stated as uncontrolled: Stable. Managed per pcp. H/o proteinuria. 5.  Diabetic neuropathy: Stable  6. Carotid Bruit heard on exam today - carotid duplex       Plan:   All pertinent lab results and cardiac tests personally reviewed by me during this office visit. 1. No med changes. 2. Will continue with risk factor modifications. Encouraged to stay active. 3. Return for regular follow up in 6 months. I appreciate the opportunity of cooperating in the care of this individual.    Jes Gonzalez. Rhys Torres M.D., [facility]    Patient's problem list, medications, allergies, past medical, surgical, social and family histories were reviewed and updated as appropriate. Scribe's attestation: This note was scribed in the presence of Dr. Rhys Torres MD, by Erica Garcia RN. The scribe's documentation has been prepared under my direction and personally reviewed by me in its entirety. I confirm that the note above accurately reflects all work, treatment, procedures, and medical decision making performed by me. The scribe's documentation has been prepared under my direction and personally reviewed by me in its entirety. I confirm that the note above accurately reflects all work, treatment, procedures, and medical decision making performed by me.

## 2020-07-23 ENCOUNTER — HOSPITAL ENCOUNTER (OUTPATIENT)
Dept: VASCULAR LAB | Age: 80
Discharge: HOME | End: 2020-07-23
Payer: MEDICARE

## 2020-07-23 PROCEDURE — 93880 EXTRACRANIAL BILAT STUDY: CPT

## 2020-08-03 RX ORDER — IRBESARTAN 300 MG/1
300 TABLET ORAL DAILY
Qty: 90 TABLET | Refills: 1 | Status: SHIPPED | OUTPATIENT
Start: 2020-08-03 | End: 2021-01-27

## 2020-08-05 ENCOUNTER — TELEPHONE (OUTPATIENT)
Dept: CARDIOLOGY CLINIC | Age: 80
End: 2020-08-05

## 2020-08-17 RX ORDER — INSULIN LISPRO 100 [IU]/ML
INJECTION, SUSPENSION SUBCUTANEOUS
Qty: 15 PEN | Refills: 4 | Status: SHIPPED | OUTPATIENT
Start: 2020-08-17 | End: 2021-01-22

## 2020-08-17 NOTE — TELEPHONE ENCOUNTER
Medication:   Requested Prescriptions     Pending Prescriptions Disp Refills    insulin lispro protamine & lispro (HUMALOG MIX 75/25 KWIKPEN) (75-25) 100 UNIT per ML SUPN injection pen 15 pen 4     Sig: INJECT 60 UNITS UNDER THE SKIN TWO TIMES A DAY WITH MEALS      Last Filled:      Patient Phone Number: [phone] (home)     Last appt: 7/8/2020   Next appt: 2/8/2021    Last OARRS: No flowsheet data found.   PDMP Monitoring:    Last PDMP [employer] as Reviewed [facility]):  Review User Review Instant Review Result          Preferred Pharmacy:   [facility] [address], [address] [facility] [phone] Reyna Singh [phone]  [address] [place] [address]  Phone: [phone] Fax: [phone]

## 2020-09-01 RX ORDER — CLOPIDOGREL BISULFATE 75 MG/1
TABLET ORAL
Qty: 90 TABLET | Refills: 3 | Status: SHIPPED | OUTPATIENT
Start: 2020-09-01 | End: 2021-08-30

## 2020-09-11 RX ORDER — BLOOD-GLUCOSE METER
KIT MISCELLANEOUS
Qty: 100 STRIP | Refills: 4 | Status: SHIPPED | OUTPATIENT
Start: 2020-09-11 | End: 2021-05-03

## 2020-11-04 RX ORDER — HYDROCHLOROTHIAZIDE 25 MG/1
TABLET ORAL
Qty: 90 TABLET | Refills: 2 | Status: SHIPPED | OUTPATIENT
Start: 2020-11-04 | End: 2021-07-30

## 2020-11-04 NOTE — TELEPHONE ENCOUNTER
Medication:   Requested Prescriptions     Pending Prescriptions Disp Refills    hydroCHLOROthiazide (HYDRODIURIL) 25 MG tablet [Pharmacy Med Name: hydroCHLOROthiazide 25 MG TABLET] 90 tablet 2     [address] MORNING          Patient Phone Number: [phone] (home)     Last appt: 2020   Next appt: 2021    Last OARRS: No flowsheet data found.   PDMP Monitoring:    Last PDMP Valentina Dotson as Reviewed [facility]):  Review User Review Instant Review Result          Preferred Pharmacy:   [facility] [address], [address] [phone] Inderjit Romo [phone] [address] [facility] [place] [address]  Phone: [phone] Fax: [phone]

## 2020-11-09 RX ORDER — HYDROCHLOROTHIAZIDE 25 MG/1
TABLET ORAL
Qty: 90 TABLET | Refills: 2 | OUTPATIENT
Start: 2020-11-09

## 2020-12-17 RX ORDER — PEN NEEDLE, DIABETIC 31 G X1/4"
NEEDLE, DISPOSABLE MISCELLANEOUS
Qty: 100 EACH | Refills: 1 | Status: SHIPPED | OUTPATIENT
Start: 2020-12-17 | End: 2020-12-18 | Stop reason: SDUPTHER

## 2020-12-18 RX ORDER — PEN NEEDLE, DIABETIC 31 G X1/4"
NEEDLE, DISPOSABLE MISCELLANEOUS
Qty: 100 EACH | Refills: 0 | Status: SHIPPED | OUTPATIENT
Start: 2020-12-18 | End: 2021-06-02

## 2021-01-04 ENCOUNTER — OFFICE VISIT (OUTPATIENT)
Dept: NEUROLOGY | Age: 81
End: 2021-01-04
Payer: MEDICARE

## 2021-01-04 VITALS
DIASTOLIC BLOOD PRESSURE: 70 MMHG | WEIGHT: 180 LBS | SYSTOLIC BLOOD PRESSURE: 146 MMHG | RESPIRATION RATE: 14 BRPM | HEART RATE: 65 BPM | BODY MASS INDEX: 25.77 KG/M2 | HEIGHT: 70 IN | OXYGEN SATURATION: 95 % | TEMPERATURE: 96.8 F

## 2021-01-04 DIAGNOSIS — G20 PARKINSON'S DISEASE (HCC): Primary | ICD-10-CM

## 2021-01-04 PROCEDURE — G8484 FLU IMMUNIZE NO ADMIN: HCPCS | Performed by: PSYCHIATRY & NEUROLOGY

## 2021-01-04 PROCEDURE — 99213 OFFICE O/P EST LOW 20 MIN: CPT | Performed by: PSYCHIATRY & NEUROLOGY

## 2021-01-04 PROCEDURE — 4040F PNEUMOC VAC/ADMIN/RCVD: CPT | Performed by: PSYCHIATRY & NEUROLOGY

## 2021-01-04 PROCEDURE — 1036F TOBACCO NON-USER: CPT | Performed by: PSYCHIATRY & NEUROLOGY

## 2021-01-04 PROCEDURE — G8417 CALC BMI ABV UP PARAM F/U: HCPCS | Performed by: PSYCHIATRY & NEUROLOGY

## 2021-01-04 PROCEDURE — G8427 DOCREV CUR MEDS BY ELIG CLIN: HCPCS | Performed by: PSYCHIATRY & NEUROLOGY

## 2021-01-04 PROCEDURE — 1123F ACP DISCUSS/DSCN MKR DOCD: CPT | Performed by: PSYCHIATRY & NEUROLOGY

## 2021-01-04 NOTE — PROGRESS NOTES
[facility]   Neurology followup    Subjective:   CC/HP  History was obtained from the patient. Patient states the tremors are reasonably well controlled. Patient has mild stiffness. No significant gait difficulties. No side effects to medication. Detailed history:  Patient complaints of tremors in both his arms. It initially started about a year ago and seems to be slowly getting worse. Onset was gradual.  Symptoms are persistent. No clear aggravating or relieving factors to symptoms. Patient denies any stiffness. He denies any difficulty with his gait   Patient has known diabetes which is well controlled  Patient denies any difficulty getting in and out of a car or rolling over in bed.   Patient complains of some generalized weakness in his legs      REVIEW OF SYSTEMS    Constitutional:  []   Chills   []  Fatigue   []  Fevers   []  Malaise   []  Weight loss     [x] Denies all of the above    Respiratory:   []  Cough    []  Shortness of breath         [x] Denies all of the above     Cardiovascular:   []  Chest pain    []  Exertional chest pressure/discomfort           [] Palpitations    []  Syncope     [x] Denies all of the above        Past Medical History:   Diagnosis Date    Arthropathy, unspecified, site unspecified     Hypertension     Hypertrophy of prostate without urinary obstruction and other lower urinary tract symptoms (LUTS)     Impotence of organic origin     Inguinal hernia     Myalgia and myositis, unspecified     Other and unspecified hyperlipidemia     Retinal microaneurysms NOS     Type II or unspecified type diabetes mellitus without mention of complication, not stated as uncontrolled      Family History   Problem Relation Age of Onset    Arthritis Father     Lupus Neg Hx     Rheum Arthritis Neg Hx     Heart Disease Neg Hx      Social History     Socioeconomic History    Marital status:      Spouse name: Not on file    Number of children: Not on file    Years of education: Not on file    Highest education level: Not on file   Occupational History    Not on file   Social Needs    Financial resource strain: Not on file    Food insecurity     Worry: Not on file     Inability: Not on file    Transportation needs     Medical: Not on file     Non-medical: Not on file   Tobacco Use    Smoking status: Never Smoker    Smokeless tobacco: Never Used   Substance and Sexual Activity    Alcohol use: Yes     Alcohol/week: 0.0 standard drinks     Comment: occasional wine    Drug use: No    Sexual activity: Yes     Partners: Female   Lifestyle    Physical activity     Days per week: Not on file     Minutes per session: Not on file    Stress: Not on file   Relationships    Social connections     Talks on phone: Not on file     Gets together: Not on file     Attends [employer] service: Not on file     Active member of club or organization: Not on file     Attends meetings of clubs or organizations: Not on file     Relationship status: Not on file    Intimate partner violence     Fear of current or ex partner: Not on file     Emotionally abused: Not on file     Physically abused: Not on file     Forced sexual activity: Not on file   Other Topics Concern    Not on file   Social History Narrative    Not on file        Objective:  Exam:  There were no vitals taken for this visit. This is a well-nourished patient in no acute distress  Patient is awake, alert and oriented x3. Speech is normal.  Pupils are equal round reacting to light. Extraocular movements intact. Face symmetrical. Tongue midline. Motor exam shows normal symmetrical strength. Cogwheel rigidity and pill-rolling tremors present in both arms. Deep tendon reflexes decreased in the legs. Plantar reflexes downgoing. Sensory exam showed decreased light touch in the distal lower extremities. Coordination normal. Gait shows mild ataxia. No carotid bruit. No neck stiffness.       Data :  LABS:  General Labs:    CBC: Lab Results   Component Value Date    WBC 8.2 01/06/2020    RBC 4.56 01/06/2020    HGB 13.6 01/06/2020    HCT 40.6 01/06/2020    MCV 89.0 01/06/2020    MCH 29.8 01/06/2020    MCHC 33.4 01/06/2020    RDW 15.0 01/06/2020     01/06/2020    MPV 8.0 01/06/2020     BMP:    Lab Results   Component Value Date     07/01/2020    K 5.2 07/01/2020     07/01/2020    CO2 24 07/01/2020    BUN 20 07/01/2020    LABALBU 4.0 07/01/2020    CREATININE 1.1 07/01/2020    CALCIUM 9.0 07/01/2020    GFRAA >60 07/01/2020    GFRAA >60 11/02/2012    LABGLOM >60 07/01/2020    GLUCOSE 130 07/30/2019       Impression :  Parkinson's disease, doing fairly well  Tremor, improved with increased dose of Sinemet  Ataxia  Diabetic peripheral neuropathy    Plan :  Discussed with patient   Continue Sinemet 25/100, one and one half tablet 3 times daily  Side effects were discussed. Continue with aggressive control of diabetes  Return in 6 months          Please note a portion of  this chart was generated using dragon dictation software. Although every effort was made to ensure the accuracy of this automated transcription, some errors in transcription may have occurred.

## 2021-01-21 NOTE — TELEPHONE ENCOUNTER
Medication:   Requested Prescriptions     Pending Prescriptions Disp Refills    insulin lispro protamine & lispro (HUMALOG MIX) (75-25) 100 UNIT per ML SUPN injection pen [Pharmacy Med Name: INSULIN LISPRO [address] [facility] 5 pen 3     Sig: INJECT 60 UNITS UNDER THE SKIN TWICE A DAY WITH A MEAL       Patient Phone Number: [phone] (home)     Last appt: 7/8/2020   Next appt: 2/8/2021    Last OARRS: No flowsheet data found.   PDMP Monitoring:    Last PDMP Rodney Balbuena as Reviewed [facility]):  Review User Review Instant Review Result          Preferred Pharmacy:   [facility] [address], [address] [facility] [phone] - F [phone]

## 2021-01-22 RX ORDER — INSULIN LISPRO 100 [IU]/ML
INJECTION, SUSPENSION SUBCUTANEOUS
Qty: 5 PEN | Refills: 3 | Status: SHIPPED | OUTPATIENT
Start: 2021-01-22 | End: 2021-03-01 | Stop reason: DRUGHIGH

## 2021-01-27 RX ORDER — IRBESARTAN 300 MG/1
TABLET ORAL
Qty: 90 TABLET | Refills: 0 | Status: SHIPPED | OUTPATIENT
Start: 2021-01-27 | End: 2021-04-29

## 2021-01-27 NOTE — TELEPHONE ENCOUNTER
Medication:   Pending Prescriptions Disp Refills    irbesartan (AVAPRO) 300 MG tablet [Pharmacy Med Name: IRBESARTAN 300 MG TABLET] 90 tablet 0     Sig: TAKE ONE TABLET BY MOUTH DAILY        Patient Phone Number: [phone] (home)     Last appt: 7/8/2020   Next appt: 2/8/2021      Preferred Pharmacy:   [address] [place] [address] [address] [facility] [phone] - F [phone]

## 2021-02-01 NOTE — TELEPHONE ENCOUNTER
Medication:   Requested Prescriptions     Pending Prescriptions Disp Refills    metFORMIN (GLUCOPHAGE) 500 MG tablet [Pharmacy Med Name: metFORMIN  MG TABLET] 180 tablet 0     Sig: TAKE ONE TABLET BY MOUTH TWICE A DAY WITH MEALS        Patient Phone Number: [phone] (home)     Last appt: 7/8/2020   Next appt: 2/8/2021        Preferred Pharmacy:   [facility] [address] [facility] [phone] - F [phone]

## 2021-02-25 ENCOUNTER — HOSPITAL ENCOUNTER (OUTPATIENT)
Age: 81
Discharge: HOME | End: 2021-02-25
Payer: MEDICARE

## 2021-02-25 DIAGNOSIS — Z12.5 SCREENING FOR PROSTATE CANCER: ICD-10-CM

## 2021-02-25 DIAGNOSIS — E03.9 HYPOTHYROIDISM, UNSPECIFIED TYPE: ICD-10-CM

## 2021-02-25 DIAGNOSIS — E11.42 DIABETIC PERIPHERAL NEUROPATHY (HCC): ICD-10-CM

## 2021-02-25 DIAGNOSIS — E78.00 PURE HYPERCHOLESTEROLEMIA: ICD-10-CM

## 2021-02-25 DIAGNOSIS — I10 ESSENTIAL HYPERTENSION: ICD-10-CM

## 2021-02-25 LAB
A/G RATIO: 1 (ref 1.1–2.2)
ALBUMIN SERPL-MCNC: 4 G/DL (ref 3.4–5)
ALP BLD-CCNC: 85 U/L (ref 40–129)
ALT SERPL-CCNC: <5 U/L (ref 10–40)
ANION GAP SERPL CALCULATED.3IONS-SCNC: 10 MMOL/L (ref 3–16)
AST SERPL-CCNC: 14 U/L (ref 15–37)
BASOPHILS ABSOLUTE: 0.1 K/UL (ref 0–0.2)
BASOPHILS RELATIVE PERCENT: 0.9 %
BILIRUB SERPL-MCNC: 0.9 MG/DL (ref 0–1)
BUN BLDV-MCNC: 37 MG/DL (ref 7–20)
CALCIUM SERPL-MCNC: 9.6 MG/DL (ref 8.3–10.6)
CHLORIDE BLD-SCNC: 103 MMOL/L (ref 99–110)
CHOLESTEROL, FASTING: 97 MG/DL (ref 0–199)
CO2: 25 MMOL/L (ref 21–32)
CREAT SERPL-MCNC: 1 MG/DL (ref 0.8–1.3)
CREATININE URINE: 97.1 MG/DL (ref 39–259)
EOSINOPHILS ABSOLUTE: 0.5 K/UL (ref 0–0.6)
EOSINOPHILS RELATIVE PERCENT: 6.2 %
GFR AFRICAN AMERICAN: >60
GFR NON-AFRICAN AMERICAN: >60
GLOBULIN: 4.1 G/DL
GLUCOSE FASTING: 136 MG/DL (ref 70–99)
HCT VFR BLD CALC: 41.6 % (ref 40.5–52.5)
HDLC SERPL-MCNC: 50 MG/DL (ref 40–60)
HEMOGLOBIN: 13.6 G/DL (ref 13.5–17.5)
LDL CHOLESTEROL CALCULATED: 36 MG/DL
LYMPHOCYTES ABSOLUTE: 1.3 K/UL (ref 1–5.1)
LYMPHOCYTES RELATIVE PERCENT: 15.6 %
MCH RBC QN AUTO: 28.5 PG (ref 26–34)
MCHC RBC AUTO-ENTMCNC: 32.7 G/DL (ref 31–36)
MCV RBC AUTO: 87.3 FL (ref 80–100)
MICROALBUMIN UR-MCNC: 2.6 MG/DL
MICROALBUMIN/CREAT UR-RTO: 26.8 MG/G (ref 0–30)
MONOCYTES ABSOLUTE: 0.7 K/UL (ref 0–1.3)
MONOCYTES RELATIVE PERCENT: 8.9 %
NEUTROPHILS ABSOLUTE: 5.6 K/UL (ref 1.7–7.7)
NEUTROPHILS RELATIVE PERCENT: 68.4 %
PDW BLD-RTO: 15.1 % (ref 12.4–15.4)
PLATELET # BLD: 288 K/UL (ref 135–450)
PMV BLD AUTO: 7.7 FL (ref 5–10.5)
POTASSIUM SERPL-SCNC: 5.9 MMOL/L (ref 3.5–5.1)
PROSTATE SPECIFIC ANTIGEN: 6.97 NG/ML (ref 0–4)
RBC # BLD: 4.76 M/UL (ref 4.2–5.9)
SODIUM BLD-SCNC: 138 MMOL/L (ref 136–145)
TOTAL PROTEIN: 8.1 G/DL (ref 6.4–8.2)
TRIGLYCERIDE, FASTING: 53 MG/DL (ref 0–150)
TSH REFLEX: 2.92 UIU/ML (ref 0.27–4.2)
VLDLC SERPL CALC-MCNC: 11 MG/DL
WBC # BLD: 8.1 K/UL (ref 4–11)

## 2021-02-25 PROCEDURE — 83036 HEMOGLOBIN GLYCOSYLATED A1C: CPT

## 2021-02-25 PROCEDURE — 82570 ASSAY OF URINE CREATININE: CPT

## 2021-02-25 PROCEDURE — 84443 ASSAY THYROID STIM HORMONE: CPT

## 2021-02-25 PROCEDURE — 85025 COMPLETE CBC W/AUTO DIFF WBC: CPT

## 2021-02-25 PROCEDURE — 84153 ASSAY OF PSA TOTAL: CPT

## 2021-02-25 PROCEDURE — 80061 LIPID PANEL: CPT

## 2021-02-25 PROCEDURE — 82043 UR ALBUMIN QUANTITATIVE: CPT

## 2021-02-25 PROCEDURE — 36415 COLL VENOUS BLD VENIPUNCTURE: CPT

## 2021-02-25 PROCEDURE — 80053 COMPREHEN METABOLIC PANEL: CPT

## 2021-02-26 LAB
ESTIMATED AVERAGE GLUCOSE: 154.2 MG/DL
HBA1C MFR BLD: 7 %

## 2021-03-01 ENCOUNTER — OFFICE VISIT (OUTPATIENT)
Dept: FAMILY MEDICINE CLINIC | Age: 81
End: 2021-03-01
Payer: MEDICARE

## 2021-03-01 VITALS
TEMPERATURE: 96.3 F | DIASTOLIC BLOOD PRESSURE: 68 MMHG | WEIGHT: 174.8 LBS | HEART RATE: 71 BPM | SYSTOLIC BLOOD PRESSURE: 118 MMHG | BODY MASS INDEX: 25.03 KG/M2 | OXYGEN SATURATION: 96 % | HEIGHT: 70 IN

## 2021-03-01 DIAGNOSIS — R97.20 ELEVATED PSA: Primary | ICD-10-CM

## 2021-03-01 DIAGNOSIS — E78.00 PURE HYPERCHOLESTEROLEMIA: ICD-10-CM

## 2021-03-01 DIAGNOSIS — I10 ESSENTIAL HYPERTENSION: ICD-10-CM

## 2021-03-01 DIAGNOSIS — Z85.89 HX OF SQUAMOUS CELL CARCINOMA: ICD-10-CM

## 2021-03-01 DIAGNOSIS — E11.42 DM TYPE 2 WITH DIABETIC PERIPHERAL NEUROPATHY (HCC): ICD-10-CM

## 2021-03-01 DIAGNOSIS — I10 ESSENTIAL HYPERTENSION: Primary | ICD-10-CM

## 2021-03-01 DIAGNOSIS — E03.9 HYPOTHYROIDISM, UNSPECIFIED TYPE: ICD-10-CM

## 2021-03-01 DIAGNOSIS — R97.20 ELEVATED PSA: ICD-10-CM

## 2021-03-01 DIAGNOSIS — E87.6 HYPOKALEMIA: ICD-10-CM

## 2021-03-01 DIAGNOSIS — E78.49 OTHER HYPERLIPIDEMIA: ICD-10-CM

## 2021-03-01 PROCEDURE — G8427 DOCREV CUR MEDS BY ELIG CLIN: HCPCS | Performed by: NURSE PRACTITIONER

## 2021-03-01 PROCEDURE — 1123F ACP DISCUSS/DSCN MKR DOCD: CPT | Performed by: NURSE PRACTITIONER

## 2021-03-01 PROCEDURE — 99214 OFFICE O/P EST MOD 30 MIN: CPT | Performed by: NURSE PRACTITIONER

## 2021-03-01 PROCEDURE — 1036F TOBACCO NON-USER: CPT | Performed by: NURSE PRACTITIONER

## 2021-03-01 PROCEDURE — 4040F PNEUMOC VAC/ADMIN/RCVD: CPT | Performed by: NURSE PRACTITIONER

## 2021-03-01 PROCEDURE — G8417 CALC BMI ABV UP PARAM F/U: HCPCS | Performed by: NURSE PRACTITIONER

## 2021-03-01 PROCEDURE — 3288F FALL RISK ASSESSMENT DOCD: CPT | Performed by: NURSE PRACTITIONER

## 2021-03-01 PROCEDURE — G8484 FLU IMMUNIZE NO ADMIN: HCPCS | Performed by: NURSE PRACTITIONER

## 2021-03-01 RX ORDER — INSULIN LISPRO 100 [IU]/ML
INJECTION, SUSPENSION SUBCUTANEOUS
Qty: 5 PEN | Refills: 3 | Status: SHIPPED
Start: 2021-03-01 | End: 2021-06-06

## 2021-03-01 ASSESSMENT — ENCOUNTER SYMPTOMS
NAUSEA: 0
CHEST TIGHTNESS: 0
CONSTIPATION: 0
DIARRHEA: 0
SHORTNESS OF BREATH: 0
VOMITING: 0

## 2021-03-01 ASSESSMENT — PATIENT HEALTH QUESTIONNAIRE - PHQ9
1. LITTLE INTEREST OR PLEASURE IN DOING THINGS: 0
SUM OF ALL RESPONSES TO PHQ QUESTIONS 1-9: 0

## 2021-03-01 NOTE — PROGRESS NOTES
Kimmie Velasco  : 1940  Encounter date: 3/1/2021    This jackson 79 y.o. male who presents with  Chief Complaint   Patient presents with    Diabetes     6 Month DM med check        History of present illness:    HPI Pt is 79year old male for follow up on type 2 insulin dependent, CAD and hyperlipidemia. Recent labs showed A1c- 7.0, well controlled. Reports taking decreased humalog mix, 30 units   Elevated BUN and elevated potassium- 5.9, pt taking HCTZ and avapro. Pt reports recently eating ruth ann oranges and drinking OJ, GFR >60. Pt sees Dr. Darylene Mody for parkinson's disease, continue with same treatment. Pt also concerned about flaky skin on head and continues to pick/scratch at it. Has seen dermatology in past for squamous cell. Review of labs showed elevation PSA level- 6.97, increased from 4.74. Denies history BPH, denies change in urination. Received COVID vaccination.     Current Outpatient Medications on File Prior to Visit   Medication Sig Dispense Refill    carbidopa-levodopa (SINEMET)  MG per tablet TAKE 1 AND 1/2 TABLET BY MOUTH THREE TIMES A  tablet 0    metFORMIN (GLUCOPHAGE) 500 MG tablet TAKE ONE TABLET BY MOUTH TWICE A DAY WITH MEALS 180 tablet 0    irbesartan (AVAPRO) 300 MG tablet TAKE ONE TABLET BY MOUTH DAILY 90 tablet 0    Insulin Pen Needle ([employer] PEN NEEDLES) 31G X 6 MM MISC USE WITH INSULIN TWO TIMES A  each 0    hydroCHLOROthiazide (HYDRODIURIL) 25 MG tablet TAKE ONE TABLET BY MOUTH EVERY MORNING 90 tablet 2    blood glucose test strips (FREESTYLE LITE) strip USE TO TEST THREE TIMES A  strip 4    clopidogrel (PLAVIX) 75 MG tablet TAKE ONE TABLET BY MOUTH DAILY 90 tablet 3    celecoxib (CELEBREX) 100 MG capsule TAKE ONE CAPSULE BY MOUTH DAILY 90 capsule 2    levothyroxine (SYNTHROID) 25 MCG tablet TAKE ONE TABLET BY MOUTH DAILY 90 tablet 2    rosuvastatin (CRESTOR) 5 MG tablet TAKE ONE TABLET BY MOUTH DAILY 90 tablet 3  metoprolol succinate (TOPROL XL) 25 MG extended release tablet TAKE ONE TABLET BY MOUTH DAILY 90 tablet 9    nitroGLYCERIN (NITROSTAT) 0.4 MG SL tablet Place 1 tablet under the tongue every 5 minutes as needed for Chest pain 25 tablet 1    Cholecalciferol (VITAMIN D) 2000 units CAPS capsule Take 2,000 Units by mouth Daily 30 capsule 5    Naproxen Sodium (ALEVE) 220 MG CAPS Take 1 tablet by mouth 2 times daily as needed for Pain      ROM MICROLET LANCETS MISC TEST BLOOD SUGAR THREE TIMES A DAY AS NEEDED 100 each 11    aspirin 81 MG EC tablet Take 81 mg by mouth daily. No current facility-administered medications on file prior to visit. Allergies   Allergen Reactions    Lipitor     Procardia [Nifedipine]     Statins Other (See Comments)     Myalgias: severe reaction.    Now taking vitamin D and can take low dose statin    Zestril [Lisinopril] Swelling     Ankles ~ 20 yrs ago    Tetracyclines & Related Rash     Past Medical History:   Diagnosis Date    Arthropathy, unspecified, site unspecified     Hypertension     Hypertrophy of prostate without urinary obstruction and other lower urinary tract symptoms (LUTS)     Impotence of organic origin     Inguinal hernia     Myalgia and myositis, unspecified     Other and unspecified hyperlipidemia     Retinal microaneurysms NOS     Type II or unspecified type diabetes mellitus without mention of complication, not stated as uncontrolled       Past Surgical History:   Procedure Laterality Date    CATARACT REMOVAL      bilateral    CORONARY ANGIOPLASTY WITH STENT PLACEMENT  [id number]    INGUINAL HERNIA REPAIR Left     LUMBAR [facility]    SHOULDER SURGERY  January 16th 2019    left shoulder replacement     SKIN CANCER EXCISION      top of head      Family History   Problem Relation Age of Onset    Arthritis Father     Lupus Neg Hx     Rheum Arthritis Neg Hx     Heart Disease Neg Hx       Social History     Tobacco Use  Smoking status: Never Smoker    Smokeless tobacco: Never Used   Substance Use Topics    Alcohol use: Yes     Alcohol/week: 0.0 standard drinks     Comment: occasional wine        Review of Systems   Constitutional: Negative for activity change, appetite change and fatigue. Eyes: Negative for visual disturbance. Respiratory: Negative for chest tightness and shortness of breath. Cardiovascular: Positive for leg swelling. Negative for chest pain and palpitations. Gastrointestinal: Negative for constipation, diarrhea, nausea and vomiting. Genitourinary: Negative for decreased urine volume, difficulty urinating, dysuria, frequency and urgency. Skin: Positive for rash (sores on top of head/scabs) and wound. Allergic/Immunologic: Positive for immunocompromised state. Neurological: Positive for tremors. Negative for headaches. Hematological: Does not bruise/bleed easily. Psychiatric/Behavioral: Negative for dysphoric mood and sleep disturbance. The patient is not nervous/anxious. Objective:    /68 (Site: Left Upper Arm, Position: Sitting, Cuff Size: Medium Adult)   Pulse 71   Temp 96.3 °F (35.7 °C) (Temporal)   Ht 5' 10\" (1.778 m)   Wt 174 lb 12.8 oz (79.3 kg)   SpO2 96%   BMI 25.08 kg/m²   Weight: 174 lb 12.8 oz (79.3 kg)     BP Readings from Last 3 Encounters:   03/01/21 118/68   01/04/21 (!) 146/70   07/20/20 138/70     Wt Readings from Last 3 Encounters:   03/01/21 174 lb 12.8 oz (79.3 kg)   01/04/21 180 lb (81.6 kg)   07/20/20 184 lb 12.8 oz (83.8 kg)     BMI Readings from Last 3 Encounters:   03/01/21 25.08 kg/m²   01/04/21 25.83 kg/m²   07/20/20 26.52 kg/m²       Physical Exam  Vitals signs reviewed. Constitutional:       Appearance: Normal appearance. He is well-developed. Eyes:      Extraocular Movements: Extraocular movements intact. Pupils: Pupils are equal, round, and reactive to light. Neck:      Musculoskeletal: Neck supple. No muscular tenderness. Cardiovascular:      Rate and Rhythm: Normal rate and regular rhythm. Heart sounds: Normal heart sounds. No murmur. Pulmonary:      Effort: Pulmonary effort is normal.      Breath sounds: Normal breath sounds. Abdominal:      General: Bowel sounds are normal.      Palpations: Abdomen is soft. Musculoskeletal:      Right lower leg: Edema present. Left lower leg: Edema present. Lymphadenopathy:      Cervical: No cervical adenopathy. Skin:     General: Skin is warm and dry. Findings: Lesion (scalp lesions, discolorization with flaking skin/scabbing) and rash present. Neurological:      General: No focal deficit present. Mental Status: He is alert and oriented to person, place, and time. Coordination: Coordination abnormal.   Psychiatric:         Mood and Affect: Mood normal.         Assessment/Plan    1. Essential hypertension  Continue Toprol XL 25 mg  Continue HCTZ 25 mg    2. Elevated PSA  Referral sent  Discussed BPH  - [facility] - Love, Stephanie Cavanaugh MD, [facility], [facility]    3. Hx of squamous cell carcinoma  Advised to avoid scratching at scalp, keep moisturized  - Linda Cooper MD, Dermatology, [place]  - triamcinolone (KENALOG) 0.1 % ointment; Apply topically 2 times daily for 7 days  Dispense: 30 g; Refill: 0  - mineral oil-hydrophilic petrolatum (AQUAPHOR) ointment; Apply topically as needed. Dispense: 50 g; Refill: 0    4. Hypokalemia  Avoid potassium rich foods  - Potassium; Future    5. Other hyperlipidemia  Continue Crestor 5 mg      Return in about 6 months (around 9/1/2021) for hypertension re-check, medication re-check, lab review, diabetes re-check. This dictation was generated by voice recognition computer software. Although all attempts are made to edit the dictation for accuracy, there may be errors in the transcription that are not intended.

## 2021-03-02 DIAGNOSIS — Z85.89 HX OF SQUAMOUS CELL CARCINOMA: ICD-10-CM

## 2021-03-10 RX ORDER — ROSUVASTATIN CALCIUM 5 MG/1
TABLET, COATED ORAL
Qty: 90 TABLET | Refills: 2 | Status: SHIPPED | OUTPATIENT
Start: 2021-03-10 | End: 2021-11-30

## 2021-03-10 NOTE — TELEPHONE ENCOUNTER
Medication:   Pending Prescriptions Disp Refills    rosuvastatin (CRESTOR) 5 MG tablet [Pharmacy Med Name: ROSUVASTATIN CALCIUM 5 MG TAB] 90 tablet 2     Sig: TAKE ONE TABLET BY MOUTH DAILY        Patient Phone Number: [phone] (home)     Last appt: 3/1/2021   Next appt: 9/1/2021    Preferred Pharmacy:   [address] [address] [place] [phone] - F [phone]

## 2021-03-26 DIAGNOSIS — Z85.89 HX OF SQUAMOUS CELL CARCINOMA: ICD-10-CM

## 2021-03-26 NOTE — TELEPHONE ENCOUNTER
Medication:   Requested Prescriptions     Pending Prescriptions Disp Refills    triamcinolone (KENALOG) 0.1 % ointment [Pharmacy Med Name: TRIAMCINOLONE 0.1% OINTMENT]  0     Sig: APPLY TO AFFECTED AREA(S) TWO TIMES A DAY FOR 7 DAYS      Provider out of office    Patient Phone Number: [phone] (home)     Last appt: 3/1/2021   Next appt: 9/1/2021    Last OARRS: No flowsheet data found.   PDMP Monitoring:    Last PDMP [place] as Reviewed [facility]):  Review User Review Instant Review Result          Preferred Pharmacy:   [facility] [address], [address] [facility] [phone] Becca Mancera [phone]  [address]  [place] [address]  Phone: [phone] Fax: [phone]

## 2021-03-29 RX ORDER — CELECOXIB 100 MG/1
CAPSULE ORAL
Qty: 90 CAPSULE | Refills: 1 | Status: SHIPPED | OUTPATIENT
Start: 2021-03-29 | End: 2021-09-20

## 2021-04-26 ENCOUNTER — APPOINTMENT (OUTPATIENT)
Dept: GENERAL RADIOLOGY | Age: 81
End: 2021-04-26
Payer: MEDICARE

## 2021-04-26 ENCOUNTER — HOSPITAL ENCOUNTER (EMERGENCY)
Age: 81
Discharge: HOME | End: 2021-04-26
Payer: MEDICARE

## 2021-04-26 VITALS
TEMPERATURE: 97.8 F | BODY MASS INDEX: 25 KG/M2 | SYSTOLIC BLOOD PRESSURE: 177 MMHG | HEART RATE: 70 BPM | DIASTOLIC BLOOD PRESSURE: 72 MMHG | WEIGHT: 174.25 LBS | OXYGEN SATURATION: 95 % | RESPIRATION RATE: 18 BRPM

## 2021-04-26 DIAGNOSIS — S61.258A DOG BITE OF INDEX FINGER, INITIAL ENCOUNTER: Primary | ICD-10-CM
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
## 2021-04-26 PROCEDURE — 6360000002 HC RX W HCPCS: Performed by: PHYSICIAN ASSISTANT

## 2021-04-26 PROCEDURE — 73140 X-RAY EXAM OF FINGER(S): CPT

## 2021-04-26 PROCEDURE — 90471 IMMUNIZATION ADMIN: CPT | Performed by: PHYSICIAN ASSISTANT

## 2021-04-26 PROCEDURE — 90715 TDAP VACCINE 7 YRS/> IM: CPT | Performed by: PHYSICIAN ASSISTANT

## 2021-04-26 PROCEDURE — 99283 EMERGENCY DEPT VISIT LOW MDM: CPT

## 2021-04-26 PROCEDURE — 6370000000 HC RX 637 (ALT 250 FOR IP): Performed by: PHYSICIAN ASSISTANT

## 2021-04-26 RX ORDER — AMOXICILLIN AND CLAVULANATE POTASSIUM 875; 125 MG/1; MG/1
1 TABLET, FILM COATED ORAL 2 TIMES DAILY
Qty: 20 TABLET | Refills: 0 | Status: SHIPPED | OUTPATIENT
Start: 2021-04-26 | End: 2021-05-06

## 2021-04-26 RX ORDER — AMOXICILLIN AND CLAVULANATE POTASSIUM 875; 125 MG/1; MG/1
1 TABLET, FILM COATED ORAL EVERY 12 HOURS SCHEDULED
Status: DISCONTINUED | OUTPATIENT
Start: 2021-04-26 | End: 2021-04-26 | Stop reason: HOSPADM

## 2021-04-26 RX ADMIN — TETANUS TOXOID, REDUCED DIPHTHERIA TOXOID AND ACELLULAR PERTUSSIS VACCINE, ADSORBED 0.5 ML: 5; 2.5; 8; 8; 2.5 SUSPENSION INTRAMUSCULAR at 08:47

## 2021-04-26 RX ADMIN — AMOXICILLIN AND CLAVULANATE POTASSIUM 1 TABLET: 875; 125 TABLET, FILM COATED ORAL at 08:47

## 2021-04-26 ASSESSMENT — ENCOUNTER SYMPTOMS
CHEST TIGHTNESS: 0
DIARRHEA: 0
ABDOMINAL PAIN: 0
SHORTNESS OF BREATH: 0
COLOR CHANGE: 1
VOMITING: 0
NAUSEA: 0

## 2021-04-26 NOTE — ED NOTES
Wound cleansed & dressed. Bleeding controlled at this time. Discharge instructions & prescription provided.        Lupillo Jolley RN  04/26/21 8428

## 2021-04-26 NOTE — ED PROVIDER NOTES
[facility]        Pt Name: Bing Garcia  MRN: [id number]  [place] 1940  Date of evaluation: 4/26/2021  Provider: Zander Sy PA-C  PCP: VERA Correa NP    CONNER. I have evaluated this patient. My supervising physician was available for consultation. CHIEF COMPLAINT       Chief Complaint   Patient presents with    Animal Bite     Dog bite to right second finger this morning. Site cleansed, wrapped with 4x4s. Bleeding uncontrolled; takes Plavix. HISTORY OF PRESENT ILLNESS   (Location, Timing/Onset, Context/Setting, Quality, Duration, Modifying Factors, Severity, Associated Signs and Symptoms)  Note limiting factors. Bing Garcia is a 79 y.o. male presents the emergency department with an injury accident that occurred in the home environment approximately 4:00 this morning. Patient states that he was bitten by his own dog. He does report the canine is up-to-date with vaccinations including that of rabies. It is reported that he has had a difficult time getting the bleeding controlled because he is on aspirin and Plavix. He states he is really done nothing yet for the wound. He states anytime he moves it it seems to start bleeding more. He is not currently up-to-date with his tetanus vaccination. He goes on to report he is having no significant areas of pain and discomfort in and around the area unless he is moving it. Is with the above-mentioned he presents for evaluation and treatment. He denies numbness and or tingling. No additional complaints at present. Nursing Notes were all reviewed and agreed with or any disagreements were addressed in the HPI. REVIEW OF SYSTEMS    (2-9 systems for level 4, 10 or more for level 5)     Review of Systems   Constitutional: Negative for activity change, chills and fever. Respiratory: Negative for chest tightness and shortness of breath. Cardiovascular: Negative for chest pain. Gastrointestinal: Negative for abdominal pain, diarrhea, nausea and vomiting. Genitourinary: Negative for dysuria and flank pain. Musculoskeletal: Positive for arthralgias. Skin: Positive for color change and wound. Hematological: Bruises/bleeds easily. Positives and Pertinent negatives as per HPI. Except as noted above in the ROS, all other systems were reviewed and negative. PAST MEDICAL HISTORY     Past Medical History:   Diagnosis Date    Arthropathy, unspecified, site unspecified     Hypertension     Hypertrophy of prostate without urinary obstruction and other lower urinary tract symptoms (LUTS)     Impotence of organic origin     Inguinal hernia     Myalgia and myositis, unspecified     Other and unspecified hyperlipidemia     Retinal microaneurysms NOS     Type II or unspecified type diabetes mellitus without mention of complication, not stated as uncontrolled          SURGICAL HISTORY     Past Surgical History:   Procedure Laterality Date    CATARACT REMOVAL      bilateral    CORONARY ANGIOPLASTY WITH STENT PLACEMENT  [id number]    INGUINAL HERNIA REPAIR Left     LUMBAR [facility] SHOULDER SURGERY  January 16th 2019    left shoulder replacement     SKIN CANCER EXCISION      top of head         CURRENTMEDICATIONS       Previous Medications    ASPIRIN 81 MG EC TABLET    Take 81 mg by mouth daily.       ROM MICROLET LANCETS MISC    TEST BLOOD SUGAR THREE TIMES A DAY AS NEEDED    BLOOD GLUCOSE TEST STRIPS (FREESTYLE LITE) STRIP    USE TO TEST THREE TIMES A DAY    CARBIDOPA-LEVODOPA (SINEMET)  MG PER TABLET    TAKE 1 AND 1/2 TABLET BY MOUTH THREE TIMES A DAY    CELECOXIB (CELEBREX) 100 MG CAPSULE    TAKE ONE CAPSULE BY MOUTH DAILY    CHOLECALCIFEROL (VITAMIN D) 2000 UNITS CAPS CAPSULE    Take 2,000 Units by mouth Daily    CLOPIDOGREL (PLAVIX) 75 MG TABLET    TAKE ONE TABLET BY MOUTH DAILY    HYDROCHLOROTHIAZIDE (HYDRODIURIL) 25 MG TABLET    TAKE ONE TABLET BY MOUTH EVERY MORNING    INSULIN LISPRO PROTAMINE & LISPRO (HUMALOG MIX) (75-25) 100 UNIT PER ML SUPN INJECTION PEN    INJECT 30 UNITS UNDER THE SKIN TWICE A DAY WITH A MEAL    INSULIN PEN NEEDLE ([employer] PEN NEEDLES) 31G X 6 MM MISC    USE WITH INSULIN TWO TIMES A DAY    IRBESARTAN (AVAPRO) 300 MG TABLET    TAKE ONE TABLET BY MOUTH DAILY    LEVOTHYROXINE (SYNTHROID) 25 MCG TABLET    TAKE ONE TABLET BY MOUTH DAILY    METFORMIN (GLUCOPHAGE) 500 MG TABLET    TAKE ONE TABLET BY MOUTH TWICE A DAY WITH MEALS    METOPROLOL SUCCINATE (TOPROL XL) 25 MG EXTENDED RELEASE TABLET    TAKE ONE TABLET BY MOUTH DAILY    MINERAL OIL-HYDROPHILIC PETROLATUM (AQUAPHOR) OINTMENT    Apply topically twice daily    NAPROXEN SODIUM (ALEVE) 220 MG CAPS    Take 1 tablet by mouth 2 times daily as needed for Pain    NITROGLYCERIN (NITROSTAT) 0.4 MG SL TABLET    Place 1 tablet under the tongue every 5 minutes as needed for Chest pain    ROSUVASTATIN (CRESTOR) 5 MG TABLET    TAKE ONE TABLET BY MOUTH DAILY    TRIAMCINOLONE (KENALOG) 0.1 % OINTMENT    APPLY TO AFFECTED AREA(S) TWO TIMES A DAY FOR 7 DAYS         ALLERGIES     Lipitor, Procardia [nifedipine], Statins, Zestril [lisinopril], and Tetracyclines & related    FAMILYHISTORY       Family History   Problem Relation Age of Onset    Arthritis Father     Lupus Neg Hx     Rheum Arthritis Neg Hx     Heart Disease Neg Hx           SOCIAL HISTORY       Social History     Tobacco Use    Smoking status: Never Smoker    Smokeless tobacco: Never Used   Substance Use Topics    Alcohol use:  Yes     Alcohol/week: 0.0 standard drinks     Comment: occasional wine    Drug use: No       SCREENINGS             PHYSICAL EXAM    (up to 7 for level 4, 8 or more for level 5)     ED Triage Vitals [04/26/21 0807]   BP Temp Temp Source Pulse Resp SpO2 Height Weight   (!) 177/72 97.8 °F (36.6 °C) Temporal 70 18 95 % -- 174 lb 4 oz (79 kg)       Physical Exam  Vitals signs and nursing note reviewed. Constitutional:       General: He is awake. He is not in acute distress. Appearance: He is well-developed. He is not ill-appearing or diaphoretic. HENT:      Head: Normocephalic and atraumatic. No raccoon eyes, Alvarez's sign, contusion or laceration. Right Ear: External ear normal.      Left Ear: External ear normal.   Eyes:      General: No scleral icterus. Right eye: No discharge. Left eye: No discharge. Conjunctiva/sclera: Conjunctivae normal.   Neck:      Musculoskeletal: Normal range of motion. Vascular: No JVD. Cardiovascular:      Rate and Rhythm: Normal rate and regular rhythm. Heart sounds: No murmur. No friction rub. No gallop. Pulmonary:      Effort: Pulmonary effort is normal. No accessory muscle usage or respiratory distress. Breath sounds: Normal breath sounds. No wheezing, rhonchi or rales. Musculoskeletal:        Hands:    Skin:     General: Skin is warm and dry. Neurological:      Mental Status: He is alert and oriented to person, place, and time. GCS: GCS eye subscore is 4. GCS verbal subscore is 5. GCS motor subscore is 6. Cranial Nerves: No cranial nerve deficit. Sensory: No sensory deficit. Coordination: Coordination normal.   Psychiatric:         Behavior: Behavior normal. Behavior is cooperative. DIAGNOSTIC RESULTS   LABS:    Labs Reviewed - No data to display    All other labs were within normal range or not returned as of this dictation. EKG: All EKG's are interpreted by the Emergency Department Physician in the absence of a cardiologist.  Please see their note for interpretation of EKG.       RADIOLOGY:   Non-plain film images such as CT, Ultrasound and MRI are read by the radiologist. Plain radiographic images are visualized and preliminarily interpreted by the ED Provider with the below findings:        Interpretation per the Radiologist below, if available at the time of this note:    XR FINGER RIGHT (MIN 2 VIEWS)   Final Result   Soft tissue injury as above. Moderate osteoarthritis of interphalangeal joints. PROCEDURES   Unless otherwise noted below, none     Procedures    CRITICAL CARE TIME   N/A    CONSULTS:  None      EMERGENCY DEPARTMENT COURSE and DIFFERENTIAL DIAGNOSIS/MDM:   Vitals:    Vitals:    04/26/21 0807   BP: (!) 177/72   Pulse: 70   Resp: 18   Temp: 97.8 °F (36.6 °C)   TempSrc: Temporal   SpO2: 95%   Weight: 174 lb 4 oz (79 kg)       Patient was given the following medications:  Medications   amoxicillin-clavulanate (AUGMENTIN) 875-125 MG per tablet 1 tablet (1 tablet Oral Given 4/26/21 0847)   gelatin adsorbable (GELFOAM) sponge 1 each (has no administration in time range)   Tetanus-Diphth-Acell Pertussis (BOOSTRIX) injection 0.5 mL (0.5 mLs Intramuscular Given 4/26/21 0847)           The patient's detailed history of present illness is documented as above. Upon arrival to the emergency department the patient's vital signs are as documented. The patient is noted to be hemodynamically stable and afebrile. Physical examination findings are as above. Wound was cleansed as above. There was oozing bleeding because of the associated soft tissue swelling this was treated with Surgifoam with good relief. Patient's tetanus was updated initiation of antibiotics. Soft tissue swelling noted however the radiograph of the finger with no evidence of acute bony abnormality with pre-existing osteoarthritis. Dressing will remain in place for the first 24 hours and then he will need a wound check by his primary care provider in 2 days. Strict potential instructions for return. The patient has been made aware of the signs and symptoms which would necessitate an immediate return to the emergency department and verbalizes an understanding of these signs and symptoms.     I estimate there is low risk for compartment syndrome, deep venous thrombosis, limb-threatening infectious, tendon and/or neurovascular injury and therefore I consider the discharge disposition reasonable. Fabiola Cavanaugh and I have discussed the diagnosis and risks, and we agree with discharging home to follow-up with their primary care provider and/or the referring orthopedist on call. We also discussed returning to the emergency department immediately if new or worsening symptoms occur. We have discussed the symptoms which are most concerning (e.g., changing or worsening pain, numbness, weakness) that necessitate immediate return. FINAL IMPRESSION      1.  Dog bite of index finger, initial encounter          DISPOSITION/PLAN   DISPOSITION        PATIENT REFERREDTO:  VERA Martinez NP  [address] [facility] [address] [address]  [phone]    In 2 days      [facility] Emergency Department  [address] [facility]  [phone]    If symptoms worsen      DISCHARGE MEDICATIONS:  New Prescriptions    AMOXICILLIN-CLAVULANATE (AUGMENTIN) 875-125 MG PER TABLET    Take 1 tablet by mouth 2 times daily for 10 days       DISCONTINUED MEDICATIONS:  Discontinued Medications    No medications on file              (Please note that portions of this note were completed with a voice recognition program.  Efforts were made to edit the dictations but occasionally words are mis-transcribed.)    Tarah Vargas PA-C (electronically signed)           Juana Zelaya PA-C  04/26/21 3472

## 2021-04-28 ENCOUNTER — TELEPHONE (OUTPATIENT)
Dept: FAMILY MEDICINE CLINIC | Age: 81
End: 2021-04-28

## 2021-04-28 ENCOUNTER — OFFICE VISIT (OUTPATIENT)
Dept: CARDIOLOGY CLINIC | Age: 81
End: 2021-04-28
Payer: MEDICARE

## 2021-04-28 VITALS
HEIGHT: 69 IN | HEART RATE: 72 BPM | OXYGEN SATURATION: 95 % | SYSTOLIC BLOOD PRESSURE: 140 MMHG | BODY MASS INDEX: 26.63 KG/M2 | WEIGHT: 179.8 LBS | DIASTOLIC BLOOD PRESSURE: 60 MMHG

## 2021-04-28 DIAGNOSIS — E11.9 TYPE 2 DIABETES MELLITUS WITHOUT COMPLICATION, WITHOUT LONG-TERM CURRENT USE OF INSULIN (HCC): ICD-10-CM

## 2021-04-28 DIAGNOSIS — I10 ESSENTIAL HYPERTENSION: ICD-10-CM

## 2021-04-28 DIAGNOSIS — I65.29 STENOSIS OF CAROTID ARTERY, UNSPECIFIED LATERALITY: ICD-10-CM

## 2021-04-28 DIAGNOSIS — E87.5 HYPERKALEMIA: ICD-10-CM

## 2021-04-28 DIAGNOSIS — E78.49 OTHER HYPERLIPIDEMIA: ICD-10-CM

## 2021-04-28 DIAGNOSIS — R09.89 BRUIT: ICD-10-CM

## 2021-04-28 DIAGNOSIS — I25.10 CORONARY ARTERY DISEASE INVOLVING NATIVE CORONARY ARTERY OF NATIVE HEART WITHOUT ANGINA PECTORIS: Primary | ICD-10-CM

## 2021-04-28 LAB — POTASSIUM SERPL-SCNC: 4.7 MMOL/L (ref 3.5–5.1)

## 2021-04-28 PROCEDURE — G8417 CALC BMI ABV UP PARAM F/U: HCPCS | Performed by: INTERNAL MEDICINE

## 2021-04-28 PROCEDURE — 3051F HG A1C>EQUAL 7.0%<8.0%: CPT | Performed by: INTERNAL MEDICINE

## 2021-04-28 PROCEDURE — G8427 DOCREV CUR MEDS BY ELIG CLIN: HCPCS | Performed by: INTERNAL MEDICINE

## 2021-04-28 PROCEDURE — 1036F TOBACCO NON-USER: CPT | Performed by: INTERNAL MEDICINE

## 2021-04-28 PROCEDURE — 99214 OFFICE O/P EST MOD 30 MIN: CPT | Performed by: INTERNAL MEDICINE

## 2021-04-28 PROCEDURE — 4040F PNEUMOC VAC/ADMIN/RCVD: CPT | Performed by: INTERNAL MEDICINE

## 2021-04-28 PROCEDURE — 93000 ELECTROCARDIOGRAM COMPLETE: CPT | Performed by: INTERNAL MEDICINE

## 2021-04-28 PROCEDURE — 1123F ACP DISCUSS/DSCN MKR DOCD: CPT | Performed by: INTERNAL MEDICINE

## 2021-04-28 NOTE — TELEPHONE ENCOUNTER
Dr Som Krishnamurthy Cardiologist wanted to speak to [facility] about the Pt's health Please give him a call back at [phone].

## 2021-04-28 NOTE — PROGRESS NOTES
extended release tablet TAKE ONE TABLET BY MOUTH DAILY 3/3/20  Yes Unruly Carpenter MD   nitroGLYCERIN (NITROSTAT) 0.4 MG SL tablet Place 1 tablet under the tongue every 5 minutes as needed for Chest pain 4/25/19  Yes Unruly Carpenter MD   Cholecalciferol (VITAMIN D) 2000 units CAPS capsule Take 2,000 Units by mouth Daily 10/22/18  Yes Unruly Carpenter MD   Naproxen Sodium (ALEVE) 220 MG CAPS Take 1 tablet by mouth 2 times daily as needed for Pain   Yes Historical Provider, MD   [facility] [address] A DAY AS NEEDED 2/6/17  Yes Gerardo Maldonado MD   aspirin 81 MG EC tablet Take 81 mg by mouth daily. Yes Historical Provider, MD      Allergies:  Lipitor, Procardia [nifedipine], Statins, Zestril [lisinopril], and Tetracyclines & related     Review of Systems:   · Constitutional: there has been no unanticipated weight loss. There's been no change in energy level, sleep pattern, or activity level. · Eyes: No visual changes or diplopia. No scleral icterus. · ENT: No Headaches, hearing loss or vertigo. No mouth sores or sore throat. · Cardiovascular: Reviewed in HPI  · Respiratory: No cough or wheezing, no sputum production. No hematemesis. · Gastrointestinal: No abdominal pain, appetite loss, blood in stools. No change in bowel or bladder habits. · Genitourinary: No dysuria, trouble voiding, or hematuria. · Musculoskeletal:  No gait disturbance, weakness or joint complaints. · Integumentary: No rash or pruritis. · Neurological: No headache, diplopia, change in muscle strength, numbness or tingling. No change in gait, balance, coordination, mood, affect, memory, mentation, behavior. · Psychiatric: No anxiety, no depression. · Endocrine: No malaise, fatigue or temperature intolerance. No excessive thirst, fluid intake, or urination. No tremor. · Hematologic/Lymphatic: No abnormal bruising or bleeding, blood clots or swollen lymph nodes.   · Allergic/Immunologic: No nasal congestion or hives. Physical Examination:    Blood pressure (!) 140/60, pulse 72, height 5' 9\" (1.753 m), weight 179 lb 12.8 oz (81.6 kg), SpO2 95 %. Constitutional and General Appearance: No acute distress  Skin:good turgor,intact without lesions  HEENT: EOMI ,normal  Neck:no JVD  Mild lower arm tremors  Respiratory:  · Normal excursion and expansion without use of accessory muscles unchanged exertional shortness of breath  · Resp Auscultation: Normal breath sounds without dullness  Cardiovascular:  · The apical impulses not displaced  · Soft murmur   · Cervical veins are not engorged  · +bruit on exam today   · Peripheral pulses are symmetrical and full  · There is no clubbing, cyanosis of the extremities. · Femoral Arteries: 2+ and equal  · Pedal Pulses: 2+ and equal   Abdomen:  · No masses or tenderness  · Liver/Spleen: No Abnormalities Noted  Neurological/Psychiatric:  · Alert and oriented in all spheres  · Moves all extremities well  · Exhibits normal gait balance and coordination  · No abnormalities of mood, affect, memory, mentation, or behavior are noted    ECHO 10/24/18   Normal left ventricle size, wall thickness, and systolic function with an   estimated ejection fraction of 55-60%. No regional wall motion abnormalities   are seen.   Normal diastolic function.   Mild mitral regurgitation is present.   Trivial tricuspid regurgitation. Alyssa Wallowa svetlana 8/25/16:  Summary    Small-moderate sized inferolateral completely reversible defect consistent  with ischemia in the territory of the distal LCx.       Normal LV size and systolic function.    No change from prior study in 2014. Assessment:  1. CAD (coronary artery disease): Stable, no anginal symptoms.    Mild LE edema (suggested support hose & elevation) -- ?proteinuria or venous insufficiency.    -Cath 12/2013> 40% proximal RCA,50% PDA,99% prox LAD EF 60%PCI with 3.5 x 15 expedition with excellent result,small diagonal occluded after case without symptoms. -GXT Aftab 12/2014> Small-moderate sized inferolateral completely reversible defect consistent with ischemia in the territory of the distal LCx   -Lexiscan stress 8/25/16> Stable  -ECHO 10/24/18> EF 55-60%      2. Hypertension: Stable  Blood pressure (!) 140/60, pulse 72, height 5' 9\" (1.753 m), weight 179 lb 12.8 oz (81.6 kg), SpO2 95 %. .     3. Hyperlipemia:  2/25/21> TC 97, TG 53, HDL 50, LDL 36. Very well controlled on Crestor 5mg. History of Rhabdomyolysis on Lipitor. 4. Type II or unspecified type diabetes mellitus without mention of complication, not stated as uncontrolled: Stable. Managed per pcp. H/o proteinuria. 5.  Diabetic neuropathy: Stable  6. Carotid Stenosis - Duplex 7/23/20>    There is <50% stenosis of the bilateral internal carotid arteries.    Vertebral flow are antegrade bilaterally     7. Hyperkalemia -  Potassium 2/25/21 5.9- will order STAT K redraw-    EKG 4/28/21> read and interpreted by me > SR 66 . No ECG changes of hyperkalemia  Stat K level 4.7        Plan:   All pertinent lab results and cardiac tests personally reviewed by me during this office visit. 1. No med changes. 2. Will continue with risk factor modifications. Encouraged to stay active. 3. Return for regular follow up in 6 months. 4. STAT Potassium     I appreciate the opportunity of cooperating in the care of this individual.    Faheem Orellana M.D., [facility]    Patient's problem list, medications, allergies, past medical, surgical, social and family histories were reviewed and updated as appropriate. Scribe's attestation: This note was scribed in the presence of Dr. Dior Orellana MD, by Corrie Crisostomo RN. The scribe's documentation has been prepared under my direction and personally reviewed by me in its entirety. I confirm that the note above accurately reflects all work, treatment, procedures, and medical decision making performed by me.

## 2021-04-29 RX ORDER — IRBESARTAN 300 MG/1
TABLET ORAL
Qty: 90 TABLET | Refills: 0 | Status: SHIPPED | OUTPATIENT
Start: 2021-04-29 | End: 2021-07-30

## 2021-04-29 NOTE — TELEPHONE ENCOUNTER
Spoke with Dr. Angelica Phan, repeat lab in cardiologist office showed decreased from 5.9 to 4.7 potassium level. Pt is taking avapro, per Dr. Angelica Phan continued on medication. Previous K- 7/2020 showed K- 5.2, normal readings throughout. Advised to stress low cholesterol diet, routine monitoring.

## 2021-04-30 DIAGNOSIS — E03.9 HYPOTHYROIDISM, UNSPECIFIED TYPE: ICD-10-CM

## 2021-04-30 DIAGNOSIS — E11.42 DM TYPE 2 WITH DIABETIC PERIPHERAL NEUROPATHY (HCC): ICD-10-CM

## 2021-05-03 RX ORDER — LEVOTHYROXINE SODIUM 0.03 MG/1
TABLET ORAL
Qty: 90 TABLET | Refills: 1 | Status: SHIPPED | OUTPATIENT
Start: 2021-05-03 | End: 2021-05-05

## 2021-05-03 RX ORDER — BLOOD-GLUCOSE METER
KIT MISCELLANEOUS
Qty: 100 STRIP | Refills: 3 | Status: SHIPPED | OUTPATIENT
Start: 2021-05-03 | End: 2021-05-05

## 2021-05-03 NOTE — TELEPHONE ENCOUNTER
Medication:   Requested Prescriptions     Pending Prescriptions Disp Refills    FREESTYLE LITE strip [Pharmacy Med Name: FREESTYLE LITE TEST STRIP] 100 strip 3     Sig: USE TO TEST THREE TIMES A DAY    levothyroxine (SYNTHROID) 25 MCG tablet [Pharmacy Med Name: LEVOTHYROXINE 25 MCG TABLET] 90 tablet 1     Sig: TAKE ONE TABLET BY MOUTH DAILY    metFORMIN (GLUCOPHAGE) 500 MG tablet [Pharmacy Med Name: metFORMIN  MG TABLET] 180 tablet 0     Sig: TAKE ONE TABLET BY MOUTH TWICE A DAY WITH MEALS      Last Filled:      Patient Phone Number: [phone] (home)     Last appt: 3/1/2021   Next appt: 9/1/2021    Last OARRS: No flowsheet data found.   PDMP Monitoring:    Last PDMP Vincent Jordan as Reviewed [facility]):  Review User Review Instant Review Result          Preferred Pharmacy:   [facility] [address], [address] [phone] Romi Shed [phone] [address] [facility]  [place] [address]  Phone: [phone] Fax: [phone]

## 2021-05-04 DIAGNOSIS — E11.42 DM TYPE 2 WITH DIABETIC PERIPHERAL NEUROPATHY (HCC): ICD-10-CM

## 2021-05-04 DIAGNOSIS — E03.9 HYPOTHYROIDISM, UNSPECIFIED TYPE: ICD-10-CM

## 2021-05-04 NOTE — TELEPHONE ENCOUNTER
Medication:   Pending Prescriptions Disp Refills    metFORMIN (GLUCOPHAGE) 500 MG tablet [Pharmacy Med Name: metFORMIN  MG TABLET] 180 tablet 0     Sig: TAKE ONE TABLET BY MOUTH TWICE A DAY WITH MEALS    levothyroxine (SYNTHROID) 25 MCG tablet [Pharmacy Med Name: LEVOTHYROXINE 25 MCG TABLET] 90 tablet 1     Sig: TAKE ONE TABLET BY MOUTH DAILY    FREESTYLE LITE strip [Pharmacy Med Name: FREESTYLE LITE TEST STRIP] 100 strip 3     Sig: USE TO TEST THREE TIMES A DAY     Patient Phone Number: [phone] (home)     Last appt: 3/1/2021   Next appt: 9/1/2021      Preferred Pharmacy:   [facility] [address], [address] [facility] [phone] - F [phone]

## 2021-05-05 RX ORDER — BLOOD-GLUCOSE METER
KIT MISCELLANEOUS
Qty: 100 STRIP | Refills: 3 | Status: SHIPPED | OUTPATIENT
Start: 2021-05-05 | End: 2021-12-06

## 2021-05-05 RX ORDER — LEVOTHYROXINE SODIUM 0.03 MG/1
TABLET ORAL
Qty: 90 TABLET | Refills: 1 | Status: SHIPPED | OUTPATIENT
Start: 2021-05-05 | End: 2022-04-26

## 2021-06-01 RX ORDER — METOPROLOL SUCCINATE 25 MG/1
TABLET, EXTENDED RELEASE ORAL
Qty: 90 TABLET | Refills: 3 | Status: SHIPPED | OUTPATIENT
Start: 2021-06-01 | End: 2021-06-02

## 2021-06-02 RX ORDER — METOPROLOL SUCCINATE 25 MG/1
TABLET, EXTENDED RELEASE ORAL
Qty: 90 TABLET | Refills: 8 | Status: SHIPPED | OUTPATIENT
Start: 2021-06-02 | End: 2022-06-06

## 2021-06-02 RX ORDER — PEN NEEDLE, DIABETIC 31 G X1/4"
NEEDLE, DISPOSABLE MISCELLANEOUS
Qty: 100 EACH | Refills: 0 | Status: SHIPPED | OUTPATIENT
Start: 2021-06-02 | End: 2021-08-04

## 2021-06-02 NOTE — TELEPHONE ENCOUNTER
Medication:   Pending Prescriptions Disp Refills    Insulin Pen Needle (KROGER PEN NEEDLES) 31G X 6 MM MISC  100 each 0     Sig: USE TO TEST BLOOD SUGAR TWO TIMES A DAY        Patient Phone Number: [phone] (home)     Last appt: 3/1/2021   Next appt: 9/1/2021    Preferred Pharmacy:   [facility] [address], [address] [facility] [phone] - F [phone]

## 2021-06-02 NOTE — TELEPHONE ENCOUNTER
Received refill request for Metoprolol 25 mg from [facility].     Last OV: 4/28/21 LES    Last Labs: 3/1/21    Last Refill: 6/1/21 #90 with 3 refills    Next Appt: 11/8/21 LES

## 2021-06-03 NOTE — TELEPHONE ENCOUNTER
Medication:   Pending Prescriptions Disp Refills    insulin lispro protamine & lispro (HUMALOG MIX) (75-25) 100 UNIT per ML SUPN injection pen  5 pen 2     Sig: INJECT 60 UNITS UNDER THE SKIN TWO TIMES A DAY WITH A MEAL        Patient Phone Number: [phone] (home)     Last appt: 3/1/2021   Next appt: 9/1/2021    Preferred Pharmacy:   [facility] [address], [address] [facility] [phone] - F [phone]

## 2021-06-06 RX ORDER — INSULIN LISPRO 100 [IU]/ML
INJECTION, SUSPENSION SUBCUTANEOUS
Qty: 5 PEN | Refills: 2 | Status: SHIPPED | OUTPATIENT
Start: 2021-06-06 | End: 2022-08-11

## 2021-06-23 DIAGNOSIS — G20 PARKINSON'S DISEASE (HCC): ICD-10-CM

## 2021-07-06 ENCOUNTER — OFFICE VISIT (OUTPATIENT)
Dept: NEUROLOGY | Age: 81
End: 2021-07-06
Payer: MEDICARE

## 2021-07-06 VITALS
WEIGHT: 179 LBS | HEART RATE: 67 BPM | DIASTOLIC BLOOD PRESSURE: 68 MMHG | SYSTOLIC BLOOD PRESSURE: 155 MMHG | BODY MASS INDEX: 26.43 KG/M2

## 2021-07-06 DIAGNOSIS — G20 PARKINSON'S DISEASE (HCC): Primary | ICD-10-CM

## 2021-07-06 DIAGNOSIS — E11.42 DIABETIC PERIPHERAL NEUROPATHY (HCC): ICD-10-CM

## 2021-07-06 PROCEDURE — 3051F HG A1C>EQUAL 7.0%<8.0%: CPT | Performed by: PSYCHIATRY & NEUROLOGY

## 2021-07-06 PROCEDURE — 4040F PNEUMOC VAC/ADMIN/RCVD: CPT | Performed by: PSYCHIATRY & NEUROLOGY

## 2021-07-06 PROCEDURE — 99213 OFFICE O/P EST LOW 20 MIN: CPT | Performed by: PSYCHIATRY & NEUROLOGY

## 2021-07-06 PROCEDURE — 1036F TOBACCO NON-USER: CPT | Performed by: PSYCHIATRY & NEUROLOGY

## 2021-07-06 PROCEDURE — G8427 DOCREV CUR MEDS BY ELIG CLIN: HCPCS | Performed by: PSYCHIATRY & NEUROLOGY

## 2021-07-06 PROCEDURE — G8417 CALC BMI ABV UP PARAM F/U: HCPCS | Performed by: PSYCHIATRY & NEUROLOGY

## 2021-07-06 PROCEDURE — 1123F ACP DISCUSS/DSCN MKR DOCD: CPT | Performed by: PSYCHIATRY & NEUROLOGY

## 2021-07-06 NOTE — PROGRESS NOTES
[facility]   Neurology followup    Subjective:   CC/HP  History was obtained from the patient. Patient states the tremors are reasonably well controlled. Patient has mild stiffness. No significant gait difficulties. No side effects to medication. Detailed history:  Patient complaints of tremors in both his arms. It initially started about a year ago and seems to be slowly getting worse. Onset was gradual.  Symptoms are persistent. No clear aggravating or relieving factors to symptoms. Patient denies any stiffness. He denies any difficulty with his gait   Patient has known diabetes which is well controlled  Patient denies any difficulty getting in and out of a car or rolling over in bed.   Patient complains of some generalized weakness in his legs      REVIEW OF SYSTEMS    Constitutional:  []   Chills   []  Fatigue   []  Fevers   []  Malaise   []  Weight loss     [x] Denies all of the above    Respiratory:   []  Cough    []  Shortness of breath         [x] Denies all of the above     Cardiovascular:   []  Chest pain    []  Exertional chest pressure/discomfort           [] Palpitations    []  Syncope     [x] Denies all of the above        Past Medical History:   Diagnosis Date    Arthropathy, unspecified, site unspecified     Hypertension     Hypertrophy of prostate without urinary obstruction and other lower urinary tract symptoms (LUTS)     Impotence of organic origin     Inguinal hernia     Myalgia and myositis, unspecified     Other and unspecified hyperlipidemia     Retinal microaneurysms NOS     Type II or unspecified type diabetes mellitus without mention of complication, not stated as uncontrolled      Family History   Problem Relation Age of Onset    Arthritis Father     Lupus Neg Hx     Rheum Arthritis Neg Hx     Heart Disease Neg Hx      Social History     Socioeconomic History    Marital status:      Spouse name: Not on file    Number of children: Not on file    Years of education: Not on file    Highest education level: Not on file   Occupational History    Not on file   Tobacco Use    Smoking status: Never Smoker    Smokeless tobacco: Never Used   Vaping Use    Vaping Use: Never used   Substance and Sexual Activity    Alcohol use: Yes     Alcohol/week: 0.0 standard drinks     Comment: occasional wine    Drug use: No    Sexual activity: Yes     Partners: Female   Other Topics Concern    Not on file   Social History Narrative    Not on file     Social Determinants of Health     Financial Resource Strain:     Difficulty of Paying Living Expenses:    Food Insecurity:     Worried About Running Out of Food in the Last Year:     [address] in the Last Year:    Transportation Needs:     Lack of Transportation (Medical):  Lack of Transportation (Non-Medical):    Physical Activity:     Days of Exercise per Week:     Minutes of Exercise per Session:    Stress:     Feeling of Stress :    Social Connections:     Frequency of Communication with Friends and Family:     Frequency of Social Gatherings with Friends and Family:     Attends [employer] Services:     Active Member of Clubs or Organizations:     Attends Club or Organization Meetings:     Marital Status:    Intimate Partner Violence:     Fear of Current or Ex-Partner:     Emotionally Abused:     Physically Abused:     Sexually Abused:         Objective:  Exam:  BP (!) 155/68   Pulse 67   Wt 179 lb (81.2 kg)   BMI 26.43 kg/m²   This is a well-nourished patient in no acute distress  Patient is awake, alert and oriented x3. Speech is normal.  Pupils are equal round reacting to light. Extraocular movements intact. Face symmetrical. Tongue midline. Motor exam shows normal symmetrical strength. Cogwheel rigidity and pill-rolling tremors present in both arms. Deep tendon reflexes decreased in the legs. Plantar reflexes downgoing.   Sensory exam showed decreased light touch in the distal lower extremities. Coordination normal. Gait shows mild ataxia. No carotid bruit. No neck stiffness. Data :  LABS:  General Labs:    CBC:   Lab Results   Component Value Date    WBC 8.1 02/25/2021    RBC 4.76 02/25/2021    HGB 13.6 02/25/2021    HCT 41.6 02/25/2021    MCV 87.3 02/25/2021    MCH 28.5 02/25/2021    MCHC 32.7 02/25/2021    RDW 15.1 02/25/2021     02/25/2021    MPV 7.7 02/25/2021     BMP:    Lab Results   Component Value Date     02/25/2021    K 4.7 04/28/2021     02/25/2021    CO2 25 02/25/2021    BUN 37 02/25/2021    LABALBU 4.0 02/25/2021    CREATININE 1.0 02/25/2021    CALCIUM 9.6 02/25/2021    GFRAA >60 02/25/2021    GFRAA >60 11/02/2012    LABGLOM >60 02/25/2021    GLUCOSE 130 07/30/2019       Impression :  Parkinson's disease, doing fairly well  Tremor, improved with increased dose of Sinemet  Ataxia  Diabetic peripheral neuropathy    Plan :  Discussed with patient   Continue Sinemet 25/100, one and one half tablet 3 times daily  Side effects were discussed. Continue with aggressive control of diabetes  Return in 6 months          Please note a portion of  this chart was generated using dragon dictation software. Although every effort was made to ensure the accuracy of this automated transcription, some errors in transcription may have occurred.

## 2021-07-08 DIAGNOSIS — Z85.89 HX OF SQUAMOUS CELL CARCINOMA: ICD-10-CM

## 2021-07-30 RX ORDER — HYDROCHLOROTHIAZIDE 25 MG/1
TABLET ORAL
Qty: 90 TABLET | Refills: 1 | Status: SHIPPED | OUTPATIENT
Start: 2021-07-30 | End: 2022-02-01

## 2021-07-30 RX ORDER — IRBESARTAN 300 MG/1
TABLET ORAL
Qty: 90 TABLET | Refills: 0 | Status: SHIPPED | OUTPATIENT
Start: 2021-07-30 | End: 2021-10-27

## 2021-08-04 RX ORDER — PEN NEEDLE, DIABETIC 31 G X1/4"
NEEDLE, DISPOSABLE MISCELLANEOUS
Qty: 100 EACH | Refills: 0 | Status: SHIPPED | OUTPATIENT
Start: 2021-08-04 | End: 2021-10-06

## 2021-08-04 NOTE — TELEPHONE ENCOUNTER
Medication:   Pending Prescriptions Disp Refills    Insulin Pen Needle (KROGER PEN NEEDLES) 31G X 6 MM MISC [Pharmacy Med Name: Husam Whitaker PEN NEEDLE 6MM X 31G] 100 each 0     Sig: USE TWO TIMES A DAY     Patient Phone Number: [phone] (home)     Last appt: 3/1/2021   Next appt: 9/1/2021    Last Labs DM:   Lab Results   Component Value Date    LABA1C 7.0 02/25/2021       Last OARRS: No flowsheet data found.

## 2021-08-30 RX ORDER — CLOPIDOGREL BISULFATE 75 MG/1
TABLET ORAL
Qty: 90 TABLET | Refills: 3 | Status: SHIPPED | OUTPATIENT
Start: 2021-08-30

## 2021-08-30 NOTE — TELEPHONE ENCOUNTER
Received refill request for Plavix 75 mg from [facility]     Last OV: 4/28/21 LES    Last Labs: 4/28/21    Last Refill: 9/1/20 #90 with 3 refills    Next Appt: 11/8/21 LES

## 2021-09-18 ENCOUNTER — HOSPITAL ENCOUNTER (OUTPATIENT)
Age: 81
Discharge: HOME | End: 2021-09-18
Payer: MEDICARE

## 2021-09-18 DIAGNOSIS — E03.9 HYPOTHYROIDISM, UNSPECIFIED TYPE: ICD-10-CM

## 2021-09-18 DIAGNOSIS — E78.00 PURE HYPERCHOLESTEROLEMIA: ICD-10-CM

## 2021-09-18 DIAGNOSIS — E11.42 DM TYPE 2 WITH DIABETIC PERIPHERAL NEUROPATHY (HCC): ICD-10-CM

## 2021-09-18 DIAGNOSIS — I10 ESSENTIAL HYPERTENSION: ICD-10-CM

## 2021-09-18 DIAGNOSIS — R97.20 ELEVATED PSA: ICD-10-CM

## 2021-09-18 LAB
A/G RATIO: 1.1 (ref 1.1–2.2)
ALBUMIN SERPL-MCNC: 4 G/DL (ref 3.4–5)
ALP BLD-CCNC: 82 U/L (ref 40–129)
ALT SERPL-CCNC: <5 U/L (ref 10–40)
ANION GAP SERPL CALCULATED.3IONS-SCNC: 13 MMOL/L (ref 3–16)
AST SERPL-CCNC: 14 U/L (ref 15–37)
BASOPHILS ABSOLUTE: 0.1 K/UL (ref 0–0.2)
BASOPHILS RELATIVE PERCENT: 1 %
BILIRUB SERPL-MCNC: 0.9 MG/DL (ref 0–1)
BUN BLDV-MCNC: 30 MG/DL (ref 7–20)
CALCIUM SERPL-MCNC: 9.3 MG/DL (ref 8.3–10.6)
CHLORIDE BLD-SCNC: 99 MMOL/L (ref 99–110)
CHOLESTEROL, FASTING: 104 MG/DL (ref 0–199)
CO2: 24 MMOL/L (ref 21–32)
CREAT SERPL-MCNC: 1.1 MG/DL (ref 0.8–1.3)
CREATININE URINE: 105.6 MG/DL (ref 39–259)
EOSINOPHILS ABSOLUTE: 0.5 K/UL (ref 0–0.6)
EOSINOPHILS RELATIVE PERCENT: 5.9 %
GFR AFRICAN AMERICAN: >60
GFR NON-AFRICAN AMERICAN: >60
GLOBULIN: 3.7 G/DL
GLUCOSE FASTING: 127 MG/DL (ref 70–99)
HCT VFR BLD CALC: 40.3 % (ref 40.5–52.5)
HDLC SERPL-MCNC: 53 MG/DL (ref 40–60)
HEMOGLOBIN: 13.5 G/DL (ref 13.5–17.5)
LDL CHOLESTEROL CALCULATED: 39 MG/DL
LYMPHOCYTES ABSOLUTE: 1.3 K/UL (ref 1–5.1)
LYMPHOCYTES RELATIVE PERCENT: 15.4 %
MCH RBC QN AUTO: 28.5 PG (ref 26–34)
MCHC RBC AUTO-ENTMCNC: 33.6 G/DL (ref 31–36)
MCV RBC AUTO: 85 FL (ref 80–100)
MICROALBUMIN UR-MCNC: 6.3 MG/DL
MICROALBUMIN/CREAT UR-RTO: 59.7 MG/G (ref 0–30)
MONOCYTES ABSOLUTE: 0.9 K/UL (ref 0–1.3)
MONOCYTES RELATIVE PERCENT: 10.7 %
NEUTROPHILS ABSOLUTE: 5.6 K/UL (ref 1.7–7.7)
NEUTROPHILS RELATIVE PERCENT: 67 %
PDW BLD-RTO: 16.5 % (ref 12.4–15.4)
PLATELET # BLD: 239 K/UL (ref 135–450)
PMV BLD AUTO: 8.2 FL (ref 5–10.5)
POTASSIUM SERPL-SCNC: 4.5 MMOL/L (ref 3.5–5.1)
PROSTATE SPECIFIC ANTIGEN: 7.09 NG/ML (ref 0–4)
RBC # BLD: 4.74 M/UL (ref 4.2–5.9)
SODIUM BLD-SCNC: 136 MMOL/L (ref 136–145)
TOTAL PROTEIN: 7.7 G/DL (ref 6.4–8.2)
TRIGLYCERIDE, FASTING: 59 MG/DL (ref 0–150)
TSH REFLEX: 3.6 UIU/ML (ref 0.27–4.2)
VLDLC SERPL CALC-MCNC: 12 MG/DL
WBC # BLD: 8.4 K/UL (ref 4–11)

## 2021-09-18 PROCEDURE — 83036 HEMOGLOBIN GLYCOSYLATED A1C: CPT

## 2021-09-18 PROCEDURE — 80053 COMPREHEN METABOLIC PANEL: CPT

## 2021-09-18 PROCEDURE — 36415 COLL VENOUS BLD VENIPUNCTURE: CPT

## 2021-09-18 PROCEDURE — 84443 ASSAY THYROID STIM HORMONE: CPT

## 2021-09-18 PROCEDURE — 82570 ASSAY OF URINE CREATININE: CPT

## 2021-09-18 PROCEDURE — 84153 ASSAY OF PSA TOTAL: CPT

## 2021-09-18 PROCEDURE — 80061 LIPID PANEL: CPT

## 2021-09-18 PROCEDURE — 82043 UR ALBUMIN QUANTITATIVE: CPT

## 2021-09-18 PROCEDURE — 85025 COMPLETE CBC W/AUTO DIFF WBC: CPT

## 2021-09-19 LAB
ESTIMATED AVERAGE GLUCOSE: 157.1 MG/DL
HBA1C MFR BLD: 7.1 %

## 2021-09-20 RX ORDER — CELECOXIB 100 MG/1
CAPSULE ORAL
Qty: 90 CAPSULE | Refills: 1 | Status: SHIPPED | OUTPATIENT
Start: 2021-09-20 | End: 2022-03-28

## 2021-09-27 ENCOUNTER — OFFICE VISIT (OUTPATIENT)
Dept: FAMILY MEDICINE CLINIC | Age: 81
End: 2021-09-27
Payer: MEDICARE

## 2021-09-27 VITALS
BODY MASS INDEX: 25.49 KG/M2 | TEMPERATURE: 97.3 F | OXYGEN SATURATION: 96 % | SYSTOLIC BLOOD PRESSURE: 134 MMHG | DIASTOLIC BLOOD PRESSURE: 64 MMHG | WEIGHT: 172.6 LBS | HEART RATE: 60 BPM

## 2021-09-27 DIAGNOSIS — E78.49 OTHER HYPERLIPIDEMIA: Primary | ICD-10-CM

## 2021-09-27 DIAGNOSIS — R97.20 ELEVATED PSA: ICD-10-CM

## 2021-09-27 DIAGNOSIS — I25.10 CORONARY ARTERY DISEASE INVOLVING NATIVE CORONARY ARTERY OF NATIVE HEART WITHOUT ANGINA PECTORIS: ICD-10-CM

## 2021-09-27 DIAGNOSIS — E11.9 TYPE 2 DIABETES MELLITUS WITHOUT COMPLICATION, WITHOUT LONG-TERM CURRENT USE OF INSULIN (HCC): ICD-10-CM

## 2021-09-27 DIAGNOSIS — Z85.89 HX OF SQUAMOUS CELL CARCINOMA: ICD-10-CM

## 2021-09-27 PROCEDURE — 1036F TOBACCO NON-USER: CPT | Performed by: NURSE PRACTITIONER

## 2021-09-27 PROCEDURE — G8417 CALC BMI ABV UP PARAM F/U: HCPCS | Performed by: NURSE PRACTITIONER

## 2021-09-27 PROCEDURE — 3051F HG A1C>EQUAL 7.0%<8.0%: CPT | Performed by: NURSE PRACTITIONER

## 2021-09-27 PROCEDURE — 4040F PNEUMOC VAC/ADMIN/RCVD: CPT | Performed by: NURSE PRACTITIONER

## 2021-09-27 PROCEDURE — 99214 OFFICE O/P EST MOD 30 MIN: CPT | Performed by: NURSE PRACTITIONER

## 2021-09-27 PROCEDURE — 1123F ACP DISCUSS/DSCN MKR DOCD: CPT | Performed by: NURSE PRACTITIONER

## 2021-09-27 PROCEDURE — G8427 DOCREV CUR MEDS BY ELIG CLIN: HCPCS | Performed by: NURSE PRACTITIONER

## 2021-09-27 NOTE — PROGRESS NOTES
Ashly Maldonado  : 1940  Encounter date: 2021    This jackson 79 y.o. male who presents with  Chief Complaint   Patient presents with    6 Month Follow-Up     DM/HTN       History of present illness:    HPI PT is 79year old male seen for medication recheck controlled insulin dependent type 2 diabetes, CAD, elevated PSA, hypertension and hyperlipidemia. Reviewed recent labs showed continued trending of PSA, last PSA- 7.09. Results for Nika Conklin (MRN [id number]) as of 2021 07:59   Ref. Range 2017 08:40 2018 08:30 2019 08:08 2020 08:18 2021 09:20 2021 09:21   Prostatic Specific Ag Latest Ref Range: 0.00 - 4.00 ng/mL 4.90 (H) 5.92 (H) 5.12 (H) 4.74 (H) 6.97 (H) 7.09 (H)   Previously referred to urology but did not follow up. Pt reports decreased appetite, has been titrating insulin down per sliding scale. Pt is being seen by Dr. Lillian Lagos for parkinson's, does not wish to pursue PT or exercises at this time. Pt also concerned about lesions on top of head, has history of squamous cell carcinoma, referral and call to be placed to dermatology.       Current Outpatient Medications on File Prior to Visit   Medication Sig Dispense Refill    celecoxib (CELEBREX) 100 MG capsule TAKE ONE CAPSULE BY MOUTH DAILY 90 capsule 1    clopidogrel (PLAVIX) 75 MG tablet TAKE ONE TABLET BY MOUTH DAILY 90 tablet 3    Insulin Pen Needle (KROGER PEN NEEDLES) 31G X 6 MM MISC USE TWO TIMES A  each 0    hydroCHLOROthiazide (HYDRODIURIL) 25 MG tablet TAKE ONE TABLET BY MOUTH EVERY MORNING 90 tablet 1    irbesartan (AVAPRO) 300 MG tablet TAKE ONE TABLET BY MOUTH DAILY 90 tablet 0    triamcinolone (KENALOG) 0.1 % ointment APPLY TO AFFECTED AREA(S) TWO TIMES A DAY FOR 7 DAYS 30 g 0    carbidopa-levodopa (SINEMET)  MG per tablet TAKE 1 AND 1/2 TABLET BY MOUTH THREE TIMES A  tablet 0    insulin lispro protamine & lispro (HUMALOG MIX) (75-25) 100 UNIT per ML SUPN injection pen INJECT 60 UNITS UNDER THE SKIN TWO TIMES A DAY WITH A MEAL 5 pen 2    metoprolol succinate (TOPROL XL) 25 MG extended release tablet TAKE ONE TABLET BY MOUTH DAILY 90 tablet 8    metFORMIN (GLUCOPHAGE) 500 MG tablet TAKE ONE TABLET BY MOUTH TWICE A DAY WITH MEALS 180 tablet 0    levothyroxine (SYNTHROID) 25 MCG tablet TAKE ONE TABLET BY MOUTH DAILY 90 tablet 1    FREESTYLE LITE strip USE TO TEST THREE TIMES A  strip 3    rosuvastatin (CRESTOR) 5 MG tablet TAKE ONE TABLET BY MOUTH DAILY 90 tablet 2    mineral oil-hydrophilic petrolatum (AQUAPHOR) ointment Apply topically twice daily 50 g 0    nitroGLYCERIN (NITROSTAT) 0.4 MG SL tablet Place 1 tablet under the tongue every 5 minutes as needed for Chest pain 25 tablet 1    Cholecalciferol (VITAMIN D) 2000 units CAPS capsule Take 2,000 Units by mouth Daily 30 capsule 5    Naproxen Sodium (ALEVE) 220 MG CAPS Take 1 tablet by mouth 2 times daily as needed for Pain      ROM MICROLET LANCETS MISC TEST BLOOD SUGAR THREE TIMES A DAY AS NEEDED 100 each 11    aspirin 81 MG EC tablet Take 81 mg by mouth daily. No current facility-administered medications on file prior to visit. Allergies   Allergen Reactions    Lipitor     Procardia [Nifedipine]     Statins Other (See Comments)     Myalgias: severe reaction.    Now taking vitamin D and can take low dose statin    Zestril [Lisinopril] Swelling     Ankles ~ 20 yrs ago    Tetracyclines & Related Rash     Past Medical History:   Diagnosis Date    Arthropathy, unspecified, site unspecified     Hypertension     Hypertrophy of prostate without urinary obstruction and other lower urinary tract symptoms (LUTS)     Impotence of organic origin     Inguinal hernia     Myalgia and myositis, unspecified     Other and unspecified hyperlipidemia     Retinal microaneurysms NOS     Type II or unspecified type diabetes mellitus without mention of complication, not stated as uncontrolled       Past Surgical History:   Procedure Laterality Date    CATARACT REMOVAL      bilateral    CORONARY ANGIOPLASTY WITH STENT PLACEMENT  [id number]    INGUINAL HERNIA REPAIR Left     LUMBAR [address] SURGERY  1998    SHOULDER SURGERY  January 16th 2019    left shoulder replacement     SKIN CANCER EXCISION      top of head      Family History   Problem Relation Age of Onset    Arthritis Father     Lupus Neg Hx     Rheum Arthritis Neg Hx     Heart Disease Neg Hx       Social History     Tobacco Use    Smoking status: Never Smoker    Smokeless tobacco: Never Used   Substance Use Topics    Alcohol use: Yes     Alcohol/week: 0.0 standard drinks     Comment: occasional wine        Review of Systems    Objective:    /64 (Site: Left Upper Arm, Position: Sitting, Cuff Size: Medium Adult)   Pulse 60   Temp 97.3 °F (36.3 °C)   Wt 172 lb 9.6 oz (78.3 kg)   SpO2 96%   BMI 25.49 kg/m²   Weight: 172 lb 9.6 oz (78.3 kg)     BP Readings from Last 3 Encounters:   09/27/21 134/64   07/06/21 (!) 155/68   04/28/21 (!) 140/60     Wt Readings from Last 3 Encounters:   09/27/21 172 lb 9.6 oz (78.3 kg)   07/06/21 179 lb (81.2 kg)   04/28/21 179 lb 12.8 oz (81.6 kg)     BMI Readings from Last 3 Encounters:   09/27/21 25.49 kg/m²   07/06/21 26.43 kg/m²   04/28/21 26.55 kg/m²       Physical Exam  Vitals reviewed. Constitutional:       Appearance: Normal appearance. He is well-developed and normal weight. Cardiovascular:      Rate and Rhythm: Normal rate and regular rhythm. Heart sounds: Normal heart sounds. No murmur heard. Pulmonary:      Effort: Pulmonary effort is normal.      Breath sounds: Normal breath sounds. Musculoskeletal:      Right lower leg: Edema present. Left lower leg: Edema present. Skin:     General: Skin is warm and dry. Findings: Lesion (top of head, several areas of concern, nonhealing sores) present. Neurological:      Mental Status: He is alert and oriented to person, place, and time. Psychiatric:         Mood and Affect: Mood normal.     Visual inspection:  Deformity/amputation: absent  Skin lesions/pre-ulcerative calluses: absent  Edema: right- 2+, left- 1+    Sensory exam:  Monofilament sensation: abnormal - decreased bilaterally near heel  (minimum of 5 random plantar locations tested, avoiding callused areas - > 1 area with absence of sensation is + for neuropathy)    Plus at least one of the following:  Pulses: normal,   Pinprick: Intact  Proprioception: Intact  Vibration (128 Hz): N/A      Assessment/Plan    1. Other hyperlipidemia  Controlled  - Lipid, Fasting; Future    2. Type 2 diabetes mellitus without complication, without long-term current use of insulin (HCC)  Controlled  - CBC Auto Differential; Future  - Comprehensive Metabolic Panel, Fasting; Future  - Hemoglobin A1C; Future  - Microalbumin / Creatinine Urine Ratio; Future  -  DIABETES FOOT EXAM    3. Coronary artery disease involving native coronary artery of native heart without angina pectoris  controlled  - Lipid, Fasting; Future    4. Hx of squamous cell carcinoma  Referral placed will call to schedule appt  - Aminta Grijalva MD, Dermatology, [facility]    5. Elevated PSA  Advised to follow up with referral  Discussed elevations of PSA in r/t BPH, Ca, infections  - AFL - LoveSamantha MD, Urology, [facility]      Return in about 6 months (around 3/27/2022) for annual check up. This dictation was generated by voice recognition computer software. Although all attempts are made to edit the dictation for accuracy, there may be errors in the transcription that are not intended.

## 2021-10-06 RX ORDER — PEN NEEDLE, DIABETIC 31 G X1/4"
NEEDLE, DISPOSABLE MISCELLANEOUS
Qty: 100 EACH | Refills: 1 | Status: SHIPPED | OUTPATIENT
Start: 2021-10-06 | End: 2022-03-09 | Stop reason: SDUPTHER

## 2021-10-06 NOTE — TELEPHONE ENCOUNTER
Medication:   Requested Prescriptions     Pending Prescriptions Disp Refills    KROGER PEN NEEDLES 31G X 6 MM [phone] [facility] [Pharmacy Med Name: Mohinder Starcher PEN NEEDLE 6MM X 31G] 100 each 1     Sig: USE TWO TIMES A DAY        Last Filled: 8/4/2021      Patient Phone Number: [phone] (home)     Last appt: 9/27/2021   Next appt: 3/28/2022    Last OARRS: No flowsheet data found.

## 2021-10-14 NOTE — PROGRESS NOTES
[address]   Cardiac Follow up    Referring Provider:  VERA Loja NP     Chief Complaint   Patient presents with    6 Month Follow-Up     no cardiac complaints at this time     f/up for CAD,chest pain  History of Present Illness:  Mr. Agapito Barrios has a history of CAD, hypertension, and hyperlipidemia. Prior to stenting in December 2013, he was short of breath while snow blowing. Previously suffered from rhabdomyolysis on Lipitor (had taken for 15 years prior without problem). Last stress test was stable. He is a retired [employer] . He is a non smoker. Prior to last visit, he had shoulder surgery. Today, he is here for regular follow up. Overall, he feels well and has no complaints. He denies exertional chest pain, HOLLEY/PND, palpitations, light-headedness. He is somewhat active, does not smoke. Sept labs reviewed. He reports BP at home 120-130/60-70       Past Medical History:   has a past medical history of Arthropathy, unspecified, site unspecified, Hypertension, Hypertrophy of prostate without urinary obstruction and other lower urinary tract symptoms (LUTS), Impotence of organic origin, Inguinal hernia, Myalgia and myositis, unspecified, Other and unspecified hyperlipidemia, Retinal microaneurysms NOS, and Type II or unspecified type diabetes mellitus without mention of complication, not stated as uncontrolled. Surgical History:   has a past surgical history that includes Lumbar disc surgery (1998); Cataract removal; Inguinal hernia repair (Left); Coronary angioplasty with stent ([id number]); Skin cancer excision; and shoulder surgery (January 16th 2019). Social History:   reports that he has never smoked. He has never used smokeless tobacco. He reports current alcohol use. He reports that he does not use drugs. Family History:  family history includes Arthritis in his father.      Home Medications:  Prior to Admission medications    Medication Sig Start Date End Date Taking?  Authorizing Provider   irbesartan (AVAPRO) 300 MG tablet TAKE ONE TABLET BY MOUTH DAILY 10/27/21  Yes VERA Pierce NP   metFORMIN (GLUCOPHAGE) 500 MG tablet TAKE ONE TABLET BY MOUTH TWICE A DAY WITH MEALS 10/19/21  Yes VERA Pierce NP   triamcinolone (KENALOG) 0.1 % ointment APPLY TO AFFECTED AREA(S) TWO TIMES A DAY FOR 7 DAYS 10/19/21  Yes VERA Pierce NP   carbidopa-levodopa (SINEMET)  MG per tablet TAKE 1 AND 1/2 TABLET BY MOUTH THREE TIMES A DAY 10/18/21  Yes Tom Piña MD   [employer] PEN NEEDLES 31G X 6 MM MISC USE TWO TIMES A DAY 10/6/21  Yes VERA Pierce NP   celecoxib (CELEBREX) 100 MG capsule TAKE ONE CAPSULE BY MOUTH DAILY 9/20/21  Yes VERA Pierce NP   clopidogrel (PLAVIX) 75 MG tablet TAKE ONE TABLET BY MOUTH DAILY 8/30/21  Yes Kay Stephen MD   hydroCHLOROthiazide (HYDRODIURIL) 25 MG tablet TAKE ONE TABLET BY MOUTH EVERY MORNING 7/30/21  Yes Darrel Baltazar MD   insulin lispro protamine & lispro (HUMALOG MIX) (75-25) 100 UNIT per ML SUPN injection pen INJECT 60 UNITS UNDER THE SKIN TWO TIMES A DAY WITH A MEAL 6/6/21  Yes VERA Pierce NP   metoprolol succinate (TOPROL XL) 25 MG extended release tablet TAKE ONE TABLET BY MOUTH DAILY 6/2/21  Yes Kay Stephen MD   levothyroxine (SYNTHROID) 25 MCG tablet TAKE ONE TABLET BY MOUTH DAILY 5/5/21  Yes VERA Pierce NP   FREESTYLE LITE strip USE TO TEST THREE TIMES A DAY 5/5/21  Yes VERA Pierce NP   rosuvastatin (CRESTOR) 5 MG tablet TAKE ONE TABLET BY MOUTH DAILY 3/10/21  Yes VERA Pierce NP   mineral oil-hydrophilic petrolatum (AQUAPHOR) ointment Apply topically twice daily 3/2/21  Yes VERA Pierce NP   nitroGLYCERIN (NITROSTAT) 0.4 MG SL tablet Place 1 tablet under the tongue every 5 minutes as needed for Chest pain 4/25/19  Yes Mumtaz Lara MD   Cholecalciferol (VITAMIN D) 2000 units CAPS capsule Take 2,000 Units by mouth Daily 10/22/18  Yes Mumtaz Lara MD   Naproxen Sodium (ALEVE) 220 MG CAPS Take 1 tablet by mouth 2 times daily as needed for Pain   Yes Historical Provider, MD   [address] A DAY AS NEEDED 2/6/17  Yes Edwin Beverly MD   aspirin 81 MG EC tablet Take 81 mg by mouth daily. Yes Historical Provider, MD      Allergies:  Lipitor, Procardia [nifedipine], Statins, Zestril [lisinopril], and Tetracyclines & related     Review of Systems:   · Constitutional: there has been no unanticipated weight loss. There's been no change in energy level, sleep pattern, or activity level. · Eyes: No visual changes or diplopia. No scleral icterus. · ENT: No Headaches, hearing loss or vertigo. No mouth sores or sore throat. · Cardiovascular: Reviewed in HPI  · Respiratory: No cough or wheezing, no sputum production. No hematemesis. · Gastrointestinal: No abdominal pain, appetite loss, blood in stools. No change in bowel or bladder habits. · Genitourinary: No dysuria, trouble voiding, or hematuria. · Musculoskeletal:  No gait disturbance, weakness or joint complaints. · Integumentary: No rash or pruritis. · Neurological: No headache, diplopia, change in muscle strength, numbness or tingling. No change in gait, balance, coordination, mood, affect, memory, mentation, behavior. · Psychiatric: No anxiety, no depression. · Endocrine: No malaise, fatigue or temperature intolerance. No excessive thirst, fluid intake, or urination. No tremor. · Hematologic/Lymphatic: No abnormal bruising or bleeding, blood clots or swollen lymph nodes. · Allergic/Immunologic: No nasal congestion or hives. Physical Examination:    Blood pressure (!) 152/70, pulse 83, height 5' 10\" (1.778 m), weight 176 lb 12.8 oz (80.2 kg), SpO2 94 %.     Constitutional and General Appearance: No 10/24/18> EF 55-60%      2. Hypertension: Stable  Blood pressure (!) 152/70, pulse 83, height 5' 10\" (1.778 m), weight 176 lb 12.8 oz (80.2 kg), SpO2 94 %. reports BP at home 120-130/60-70   Recheck by me 130/60  . 3. Hyperlipemia:  2/25/21> TC 97, TG 53, HDL 50, LDL 36. Very well controlled on Crestor 5mg. History of Rhabdomyolysis on Lipitor. 4. Type II or unspecified type diabetes mellitus without mention of complication, not stated as uncontrolled: Stable. Managed per pcp. H/o proteinuria. 5.  Diabetic neuropathy: Stable  6. Carotid Stenosis - Duplex 7/23/20>    There is <50% stenosis of the bilateral internal carotid arteries.    Vertebral flow are antegrade bilaterally     7. Hyperkalemia -  Potassium 2/25/21 5.9- will order STAT K redraw-    EKG 4/28/21> read and interpreted by me > SR 66 . No ECG changes of hyperkalemia  Stat K level 4.7, 9/18/21> 4.5        Plan:   All pertinent lab results and cardiac tests personally reviewed by me during this office visit. 1. No med changes. 2. Will continue with risk factor modifications. Encouraged to stay active. 3. Return for regular follow up in 6 months. I appreciate the opportunity of cooperating in the care of this individual.    [place]. Maria Guadalupe Salcido M.D., [facility]    Patient's problem list, medications, allergies, past medical, surgical, social and family histories were reviewed and updated as appropriate. Scribe's attestation: This note was scribed in the presence of Dr. Maria Guadalupe Salcido MD, by Tacho Montoya RN. The scribe's documentation has been prepared under my direction and personally reviewed by me in its entirety. I confirm that the note above accurately reflects all work, treatment, procedures, and medical decision making performed by me.

## 2021-10-18 DIAGNOSIS — Z85.89 HX OF SQUAMOUS CELL CARCINOMA: ICD-10-CM

## 2021-10-18 DIAGNOSIS — G20 PARKINSON'S DISEASE (HCC): ICD-10-CM

## 2021-10-19 LAB
CATARACTS: NEGATIVE
DIABETIC RETINOPATHY: NEGATIVE
GLAUCOMA: NEGATIVE
INTRAOCULAR PRESSURE EYE: NORMAL
VISUAL ACUITY DISTANCE LEFT EYE: NORMAL
VISUAL ACUITY DISTANCE RIGHT EYE: NORMAL

## 2021-10-19 NOTE — TELEPHONE ENCOUNTER
Medication:   Requested Prescriptions     Pending Prescriptions Disp Refills    metFORMIN (GLUCOPHAGE) 500 MG tablet [Pharmacy Med Name: metFORMIN  MG TABLET] 180 tablet 1     Sig: TAKE ONE TABLET BY MOUTH TWICE A DAY WITH MEALS    triamcinolone (KENALOG) 0.1 % ointment [Pharmacy Med Name: TRIAMCINOLONE 0.1% OINTMENT] 30 g 3     Sig: APPLY TO AFFECTED AREA(S) TWO TIMES A DAY FOR 7 DAYS        Last Filled:      Patient Phone Number: [phone] (home)     Last appt: 9/27/2021   Next appt: 3/28/2022    Last OARRS: No flowsheet data found.

## 2021-10-27 RX ORDER — IRBESARTAN 300 MG/1
TABLET ORAL
Qty: 90 TABLET | Refills: 1 | Status: SHIPPED | OUTPATIENT
Start: 2021-10-27 | End: 2022-04-26 | Stop reason: SDUPTHER

## 2021-10-27 NOTE — TELEPHONE ENCOUNTER
Medication:   Requested Prescriptions     Pending Prescriptions Disp Refills    irbesartan (AVAPRO) 300 MG tablet [Pharmacy Med Name: IRBESARTAN 300 MG TABLET] 90 tablet 1     Sig: TAKE ONE TABLET BY MOUTH DAILY        Last Filled: 7/30/2021      Patient Phone Number: [phone] (home)     Last appt: 9/27/2021   Next appt: 3/28/2022    Last OARRS: No flowsheet data found.

## 2021-11-08 ENCOUNTER — OFFICE VISIT (OUTPATIENT)
Dept: CARDIOLOGY CLINIC | Age: 81
End: 2021-11-08
Payer: MEDICARE

## 2021-11-08 VITALS
WEIGHT: 176.8 LBS | BODY MASS INDEX: 25.31 KG/M2 | SYSTOLIC BLOOD PRESSURE: 130 MMHG | DIASTOLIC BLOOD PRESSURE: 60 MMHG | HEART RATE: 83 BPM | HEIGHT: 70 IN | OXYGEN SATURATION: 94 %

## 2021-11-08 DIAGNOSIS — E78.49 OTHER HYPERLIPIDEMIA: ICD-10-CM

## 2021-11-08 DIAGNOSIS — E78.00 PURE HYPERCHOLESTEROLEMIA: ICD-10-CM

## 2021-11-08 DIAGNOSIS — I10 PRIMARY HYPERTENSION: ICD-10-CM

## 2021-11-08 DIAGNOSIS — I25.10 CORONARY ARTERY DISEASE INVOLVING NATIVE CORONARY ARTERY OF NATIVE HEART WITHOUT ANGINA PECTORIS: Primary | ICD-10-CM

## 2021-11-08 PROCEDURE — 4040F PNEUMOC VAC/ADMIN/RCVD: CPT | Performed by: INTERNAL MEDICINE

## 2021-11-08 PROCEDURE — G8484 FLU IMMUNIZE NO ADMIN: HCPCS | Performed by: INTERNAL MEDICINE

## 2021-11-08 PROCEDURE — G8427 DOCREV CUR MEDS BY ELIG CLIN: HCPCS | Performed by: INTERNAL MEDICINE

## 2021-11-08 PROCEDURE — 1123F ACP DISCUSS/DSCN MKR DOCD: CPT | Performed by: INTERNAL MEDICINE

## 2021-11-08 PROCEDURE — 1036F TOBACCO NON-USER: CPT | Performed by: INTERNAL MEDICINE

## 2021-11-08 PROCEDURE — 99214 OFFICE O/P EST MOD 30 MIN: CPT | Performed by: INTERNAL MEDICINE

## 2021-11-08 PROCEDURE — G8417 CALC BMI ABV UP PARAM F/U: HCPCS | Performed by: INTERNAL MEDICINE

## 2021-11-30 RX ORDER — ROSUVASTATIN CALCIUM 5 MG/1
TABLET, COATED ORAL
Qty: 90 TABLET | Refills: 2 | Status: SHIPPED | OUTPATIENT
Start: 2021-11-30 | End: 2022-08-25

## 2021-11-30 NOTE — TELEPHONE ENCOUNTER
Medication:   Requested Prescriptions     Pending Prescriptions Disp Refills    rosuvastatin (CRESTOR) 5 MG tablet [Pharmacy Med Name: ROSUVASTATIN CALCIUM 5 MG TAB] 90 tablet 2     Sig: TAKE ONE TABLET BY MOUTH DAILY        Last Filled: 3/10/2021     Patient Phone Number: [phone] (home)     Last appt: 9/27/2021   Next appt: 3/28/2022    Last OARRS: No flowsheet data found.

## 2021-12-04 DIAGNOSIS — E11.42 DM TYPE 2 WITH DIABETIC PERIPHERAL NEUROPATHY (HCC): ICD-10-CM

## 2021-12-06 RX ORDER — BLOOD-GLUCOSE METER
KIT MISCELLANEOUS
Qty: 100 STRIP | Refills: 3 | Status: SHIPPED | OUTPATIENT
Start: 2021-12-06 | End: 2022-06-13

## 2021-12-06 NOTE — TELEPHONE ENCOUNTER
Medication:   Requested Prescriptions     Pending Prescriptions Disp Refills    blood glucose test strips (FREESTYLE LITE) strip [Pharmacy Med Name: FREESTYLE LITE TEST STRIP] 100 strip 3     Sig: USE TO TEST BLOOD SUGAR THREE TIMES A DAY        Last Filled: 5/5/2021      Patient Phone Number: [phone] (home)     Last appt: 9/27/2021   Next appt: 3/28/2022    Last OARRS: No flowsheet data found.

## 2022-01-11 ENCOUNTER — OFFICE VISIT (OUTPATIENT)
Dept: NEUROLOGY | Age: 82
End: 2022-01-11
Payer: MEDICARE

## 2022-01-11 VITALS
WEIGHT: 176 LBS | HEIGHT: 70 IN | DIASTOLIC BLOOD PRESSURE: 64 MMHG | BODY MASS INDEX: 25.2 KG/M2 | SYSTOLIC BLOOD PRESSURE: 142 MMHG | HEART RATE: 63 BPM

## 2022-01-11 DIAGNOSIS — R27.0 ATAXIA: ICD-10-CM

## 2022-01-11 DIAGNOSIS — E11.42 DIABETIC PERIPHERAL NEUROPATHY (HCC): Primary | ICD-10-CM

## 2022-01-11 DIAGNOSIS — G20 PARKINSON'S DISEASE (HCC): ICD-10-CM

## 2022-01-11 PROCEDURE — 1123F ACP DISCUSS/DSCN MKR DOCD: CPT | Performed by: PSYCHIATRY & NEUROLOGY

## 2022-01-11 PROCEDURE — G8484 FLU IMMUNIZE NO ADMIN: HCPCS | Performed by: PSYCHIATRY & NEUROLOGY

## 2022-01-11 PROCEDURE — 4040F PNEUMOC VAC/ADMIN/RCVD: CPT | Performed by: PSYCHIATRY & NEUROLOGY

## 2022-01-11 PROCEDURE — G8417 CALC BMI ABV UP PARAM F/U: HCPCS | Performed by: PSYCHIATRY & NEUROLOGY

## 2022-01-11 PROCEDURE — 1036F TOBACCO NON-USER: CPT | Performed by: PSYCHIATRY & NEUROLOGY

## 2022-01-11 PROCEDURE — G8427 DOCREV CUR MEDS BY ELIG CLIN: HCPCS | Performed by: PSYCHIATRY & NEUROLOGY

## 2022-01-11 PROCEDURE — 99214 OFFICE O/P EST MOD 30 MIN: CPT | Performed by: PSYCHIATRY & NEUROLOGY

## 2022-01-11 NOTE — PROGRESS NOTES
[facility]   Neurology followup    Subjective:   CC/HP  History was obtained from the patient. Patient states the tremors are reasonably well controlled. Patient has mild stiffness. No significant gait difficulties. No side effects to medication. Diabetes is reasonably controlled. His last hemoglobin A1c is 7.1. Detailed history:  Patient complaints of tremors in both his arms. It initially started about a year ago and seems to be slowly getting worse. Onset was gradual.  Symptoms are persistent. No clear aggravating or relieving factors to symptoms. Patient denies any stiffness. He denies any difficulty with his gait   Patient has known diabetes which is well controlled  Patient denies any difficulty getting in and out of a car or rolling over in bed.   Patient complains of some generalized weakness in his legs      REVIEW OF SYSTEMS    Constitutional:  []   Chills   []  Fatigue   []  Fevers   []  Malaise   []  Weight loss     [x] Denies all of the above    Respiratory:   []  Cough    []  Shortness of breath         [x] Denies all of the above     Cardiovascular:   []  Chest pain    []  Exertional chest pressure/discomfort           [] Palpitations    []  Syncope     [x] Denies all of the above        Past Medical History:   Diagnosis Date    Arthropathy, unspecified, site unspecified     Hypertension     Hypertrophy of prostate without urinary obstruction and other lower urinary tract symptoms (LUTS)     Impotence of organic origin     Inguinal hernia     Myalgia and myositis, unspecified     Other and unspecified hyperlipidemia     Retinal microaneurysms NOS     Type II or unspecified type diabetes mellitus without mention of complication, not stated as uncontrolled      Family History   Problem Relation Age of Onset    Arthritis Father     Lupus Neg Hx     Rheum Arthritis Neg Hx     Heart Disease Neg Hx      Social History     Socioeconomic History    Marital status:  Spouse name: Not on file    Number of children: Not on file    Years of education: Not on file    Highest education level: Not on file   Occupational History    Not on file   Tobacco Use    Smoking status: Never Smoker    Smokeless tobacco: Never Used   Vaping Use    Vaping Use: Never used   Substance and Sexual Activity    Alcohol use: Yes     Alcohol/week: 0.0 standard drinks     Comment: occasional wine    Drug use: No    Sexual activity: Yes     Partners: Female   Other Topics Concern    Not on file   Social History Narrative    Not on file     Social Determinants of Health     Financial Resource Strain:     Difficulty of Paying Living Expenses: Not on file   Food Insecurity:     Worried About Running Out of Food in the Last Year: Not on file    Ellen of Food in the Last Year: Not on file   Transportation Needs:     Lack of Transportation (Medical): Not on file    Lack of Transportation (Non-Medical):  Not on file   Physical Activity:     Days of Exercise per Week: Not on file    Minutes of Exercise per Session: Not on file   Stress:     Feeling of Stress : Not on file   Social Connections:     Frequency of Communication with Friends and Family: Not on file    Frequency of Social Gatherings with Friends and Family: Not on file    Attends Confucianism Services: Not on file    Active Member of [address] [place] [address] [employer] or Organizations: Not on file    Attends Club or Organization Meetings: Not on file    Marital Status: Not on file   Intimate Partner Violence:     Fear of Current or Ex-Partner: Not on file    Emotionally Abused: Not on file    Physically Abused: Not on file    Sexually Abused: Not on file   Housing Stability:     Unable to Pay for Housing in the Last Year: Not on file    Number of [place] in the Last Year: Not on file    Unstable Housing in the Last Year: Not on file        Objective:  Exam:  BP (!) 142/64   Pulse 63   Ht 5' 10\" (1.778 m)   Wt 176 lb (79.8 kg)   BMI 25.25 kg/m² This is a well-nourished patient in no acute distress  Patient is awake, alert and oriented x3. Speech is normal.  Pupils are equal round reacting to light. Extraocular movements intact. Face symmetrical. Tongue midline. Motor exam shows normal symmetrical strength. Cogwheel rigidity and pill-rolling tremors present in both arms. Deep tendon reflexes decreased in the legs. Plantar reflexes downgoing. Sensory exam showed decreased light touch in the distal lower extremities. Coordination normal. Gait shows mild ataxia. No carotid bruit. No neck stiffness. Data :  LABS:  General Labs:    CBC:   Lab Results   Component Value Date    WBC 8.4 09/18/2021    RBC 4.74 09/18/2021    HGB 13.5 09/18/2021    HCT 40.3 09/18/2021    MCV 85.0 09/18/2021    MCH 28.5 09/18/2021    MCHC 33.6 09/18/2021    RDW 16.5 09/18/2021     09/18/2021    MPV 8.2 09/18/2021     BMP:    Lab Results   Component Value Date     09/18/2021    K 4.5 09/18/2021    CL 99 09/18/2021    CO2 24 09/18/2021    BUN 30 09/18/2021    LABALBU 4.0 09/18/2021    CREATININE 1.1 09/18/2021    CALCIUM 9.3 09/18/2021    GFRAA >60 09/18/2021    GFRAA >60 11/02/2012    LABGLOM >60 09/18/2021    GLUCOSE 130 07/30/2019       Impression :  Parkinson's disease, doing fairly well  Tremor, improved with increased dose of Sinemet  Ataxia  Diabetic peripheral neuropathy, stable    Plan :  Discussed with patient   Continue Sinemet 25/100, one and one half tablet 3 times daily  Side effects were discussed. Continue with aggressive control of diabetes  Return in 6 months          Please note a portion of  this chart was generated using dragon dictation software. Although every effort was made to ensure the accuracy of this automated transcription, some errors in transcription may have occurred.

## 2022-01-31 NOTE — TELEPHONE ENCOUNTER
Medication:   Requested Prescriptions     Pending Prescriptions Disp Refills    hydroCHLOROthiazide (HYDRODIURIL) 25 MG tablet [Pharmacy Med Name: hydroCHLOROthiazide 25 MG TABLET] 90 tablet 1     Sig: TAKE ONE TABLET BY MOUTH EVERY MORNING        Last Filled:      Patient Phone Number: [phone] (home)     Last appt: 9/27/2021   Next appt: 3/28/2022    Last OARRS: No flowsheet data found.

## 2022-02-01 RX ORDER — HYDROCHLOROTHIAZIDE 25 MG/1
TABLET ORAL
Qty: 90 TABLET | Refills: 1 | Status: SHIPPED | OUTPATIENT
Start: 2022-02-01 | End: 2022-07-27

## 2022-03-09 RX ORDER — PEN NEEDLE, DIABETIC 31 G X1/4"
NEEDLE, DISPOSABLE MISCELLANEOUS
Qty: 100 EACH | Refills: 1 | Status: SHIPPED | OUTPATIENT
Start: 2022-03-09 | End: 2022-08-02

## 2022-03-09 NOTE — TELEPHONE ENCOUNTER
Medication:   Requested Prescriptions     Pending Prescriptions Disp Refills    Insulin Pen Needle ([employer] PEN NEEDLES) 31G X 6 MM MISC 100 each 1     Sig: USE TWO TIMES A DAY        Last Filled: 10/6/2021      Patient Phone Number: [phone] (home)     Last appt: 9/27/2021   Next appt: 3/28/2022    Last OARRS: No flowsheet data found.

## 2022-03-24 ENCOUNTER — HOSPITAL ENCOUNTER (OUTPATIENT)
Age: 82
Discharge: HOME | End: 2022-03-24
Payer: MEDICARE

## 2022-03-24 DIAGNOSIS — E11.9 TYPE 2 DIABETES MELLITUS WITHOUT COMPLICATION, WITHOUT LONG-TERM CURRENT USE OF INSULIN (HCC): ICD-10-CM

## 2022-03-24 DIAGNOSIS — I25.10 CORONARY ARTERY DISEASE INVOLVING NATIVE CORONARY ARTERY OF NATIVE HEART WITHOUT ANGINA PECTORIS: ICD-10-CM

## 2022-03-24 DIAGNOSIS — E78.49 OTHER HYPERLIPIDEMIA: ICD-10-CM

## 2022-03-24 LAB
A/G RATIO: 1.4 (ref 1.1–2.2)
ALBUMIN SERPL-MCNC: 4.4 G/DL (ref 3.4–5)
ALP BLD-CCNC: 104 U/L (ref 40–129)
ALT SERPL-CCNC: <5 U/L (ref 10–40)
ANION GAP SERPL CALCULATED.3IONS-SCNC: 16 MMOL/L (ref 3–16)
AST SERPL-CCNC: 14 U/L (ref 15–37)
BASOPHILS ABSOLUTE: 0.1 K/UL (ref 0–0.2)
BASOPHILS RELATIVE PERCENT: 1.4 %
BILIRUB SERPL-MCNC: 1.3 MG/DL (ref 0–1)
BUN BLDV-MCNC: 30 MG/DL (ref 7–20)
CALCIUM SERPL-MCNC: 9.3 MG/DL (ref 8.3–10.6)
CHLORIDE BLD-SCNC: 102 MMOL/L (ref 99–110)
CHOLESTEROL, FASTING: 97 MG/DL (ref 0–199)
CO2: 22 MMOL/L (ref 21–32)
CREAT SERPL-MCNC: 1.1 MG/DL (ref 0.8–1.3)
CREATININE URINE: 84.6 MG/DL (ref 39–259)
EOSINOPHILS ABSOLUTE: 0.4 K/UL (ref 0–0.6)
EOSINOPHILS RELATIVE PERCENT: 5 %
GFR AFRICAN AMERICAN: >60
GFR NON-AFRICAN AMERICAN: >60
GLUCOSE FASTING: 158 MG/DL (ref 70–99)
HCT VFR BLD CALC: 39.6 % (ref 40.5–52.5)
HDLC SERPL-MCNC: 44 MG/DL (ref 40–60)
HEMOGLOBIN: 13.1 G/DL (ref 13.5–17.5)
LDL CHOLESTEROL CALCULATED: 39 MG/DL
LYMPHOCYTES ABSOLUTE: 1.1 K/UL (ref 1–5.1)
LYMPHOCYTES RELATIVE PERCENT: 15.5 %
MCH RBC QN AUTO: 29.2 PG (ref 26–34)
MCHC RBC AUTO-ENTMCNC: 33.1 G/DL (ref 31–36)
MCV RBC AUTO: 88.3 FL (ref 80–100)
MICROALBUMIN UR-MCNC: 3.4 MG/DL
MICROALBUMIN/CREAT UR-RTO: 40.2 MG/G (ref 0–30)
MONOCYTES ABSOLUTE: 0.8 K/UL (ref 0–1.3)
MONOCYTES RELATIVE PERCENT: 10.9 %
NEUTROPHILS ABSOLUTE: 4.8 K/UL (ref 1.7–7.7)
NEUTROPHILS RELATIVE PERCENT: 67.2 %
PDW BLD-RTO: 15.5 % (ref 12.4–15.4)
PLATELET # BLD: 270 K/UL (ref 135–450)
PMV BLD AUTO: 7.7 FL (ref 5–10.5)
POTASSIUM SERPL-SCNC: 5 MMOL/L (ref 3.5–5.1)
RBC # BLD: 4.49 M/UL (ref 4.2–5.9)
SODIUM BLD-SCNC: 140 MMOL/L (ref 136–145)
TOTAL PROTEIN: 7.5 G/DL (ref 6.4–8.2)
TRIGLYCERIDE, FASTING: 68 MG/DL (ref 0–150)
VLDLC SERPL CALC-MCNC: 14 MG/DL
WBC # BLD: 7.1 K/UL (ref 4–11)

## 2022-03-24 PROCEDURE — 83036 HEMOGLOBIN GLYCOSYLATED A1C: CPT

## 2022-03-24 PROCEDURE — 80061 LIPID PANEL: CPT

## 2022-03-24 PROCEDURE — 82043 UR ALBUMIN QUANTITATIVE: CPT

## 2022-03-24 PROCEDURE — 36415 COLL VENOUS BLD VENIPUNCTURE: CPT

## 2022-03-24 PROCEDURE — 85025 COMPLETE CBC W/AUTO DIFF WBC: CPT

## 2022-03-24 PROCEDURE — 82570 ASSAY OF URINE CREATININE: CPT

## 2022-03-24 PROCEDURE — 80053 COMPREHEN METABOLIC PANEL: CPT

## 2022-03-25 LAB
ESTIMATED AVERAGE GLUCOSE: 177.2 MG/DL
HBA1C MFR BLD: 7.8 %

## 2022-03-28 ENCOUNTER — OFFICE VISIT (OUTPATIENT)
Dept: FAMILY MEDICINE CLINIC | Age: 82
End: 2022-03-28
Payer: MEDICARE

## 2022-03-28 VITALS
HEART RATE: 60 BPM | HEIGHT: 67 IN | TEMPERATURE: 97.2 F | OXYGEN SATURATION: 97 % | BODY MASS INDEX: 28.09 KG/M2 | SYSTOLIC BLOOD PRESSURE: 130 MMHG | WEIGHT: 179 LBS | DIASTOLIC BLOOD PRESSURE: 56 MMHG

## 2022-03-28 DIAGNOSIS — Z00.00 ENCOUNTER FOR ANNUAL WELLNESS VISIT (AWV) IN MEDICARE PATIENT: Primary | ICD-10-CM

## 2022-03-28 DIAGNOSIS — Z12.5 SCREENING FOR PROSTATE CANCER: ICD-10-CM

## 2022-03-28 DIAGNOSIS — E11.42 DIABETIC PERIPHERAL NEUROPATHY (HCC): ICD-10-CM

## 2022-03-28 DIAGNOSIS — Z85.89 HX OF SQUAMOUS CELL CARCINOMA: ICD-10-CM

## 2022-03-28 DIAGNOSIS — E11.9 TYPE 2 DIABETES MELLITUS WITHOUT COMPLICATION, WITHOUT LONG-TERM CURRENT USE OF INSULIN (HCC): ICD-10-CM

## 2022-03-28 PROCEDURE — 3051F HG A1C>EQUAL 7.0%<8.0%: CPT | Performed by: NURSE PRACTITIONER

## 2022-03-28 PROCEDURE — 1123F ACP DISCUSS/DSCN MKR DOCD: CPT | Performed by: NURSE PRACTITIONER

## 2022-03-28 PROCEDURE — G8484 FLU IMMUNIZE NO ADMIN: HCPCS | Performed by: NURSE PRACTITIONER

## 2022-03-28 PROCEDURE — G0439 PPPS, SUBSEQ VISIT: HCPCS | Performed by: NURSE PRACTITIONER

## 2022-03-28 PROCEDURE — 4040F PNEUMOC VAC/ADMIN/RCVD: CPT | Performed by: NURSE PRACTITIONER

## 2022-03-28 ASSESSMENT — PATIENT HEALTH QUESTIONNAIRE - PHQ9
2. FEELING DOWN, DEPRESSED OR HOPELESS: 0
SUM OF ALL RESPONSES TO PHQ QUESTIONS 1-9: 0
SUM OF ALL RESPONSES TO PHQ9 QUESTIONS 1 & 2: 0
SUM OF ALL RESPONSES TO PHQ QUESTIONS 1-9: 0
1. LITTLE INTEREST OR PLEASURE IN DOING THINGS: 0
SUM OF ALL RESPONSES TO PHQ QUESTIONS 1-9: 0
SUM OF ALL RESPONSES TO PHQ QUESTIONS 1-9: 0

## 2022-03-28 NOTE — PROGRESS NOTES
[facility] VISIT    Patient is here for their Medicare Annual Wellness Visit/lab review. Last eye exam: September 2021  Last dental exam: Due in May  Exercise: N/A  Diet: well balanced    How would you rate your overall health? : Good        Fall Risk 3/28/2022 3/1/2021 8/7/2019 6/19/2018 4/10/2017 12/21/2016 6/8/2015   2 or more falls in past year? no no no no no no no   Fall with injury in past year? no no no no no no no       PHQ Scores 3/28/2022 3/1/2021 2/6/2019 6/19/2018 12/21/2016   PHQ2 Score 0 0 0 0 0   PHQ9 Score 0 0 0 0 0       Do you always wear a seat belt in the car?: Yes      Have you noted any problems with hearing?: No  Have you noted any vision problems?: No  Do you have concerns about your sexual health?: no  In the past month how much has pain been an issue for you?:  Not at all  In the past month have you had issues with anxiety, loneliness, irritability or fatigue:  Not at all    Do you take opioid medications even sometimes? No  beneficial)    Living Will: Yes,   Copy requested      Healthcare Decision Maker:  No healthcare decision makers have been documented. Click here to complete [address] including selection of the Healthcare Decision Maker Relationship (ie \"Primary\")         Who lives at home with you: Wife  Do you have any services coming to your home (meals on wheels, home health, etc) ? : yes -       Do you need help with:  Using the phone:  No  Bathing: No  Dressing:  No  Toileting: No  Transportation:  No  Shopping: No  Preparing meals: N/A  Housework/Laundry: No  Medications: No  Money management: No    Does your home have:  Unsecured throw rugs: No  Grab bars in bathroom: Yes  Walk in shower: Yes  Seat in shower: Yes  Lit pathways for night (nightlights): Yes    Memory:  Have you or anyone close to you expressed concerns about your memory: No    Knows:  Month: Yes  Day: Yes  Year: Yes  Day of Week: Yes  Able to Recall (tree, fork, ball) : Yes    Patient history:   Patient's medications, allergies, past medical, surgical, social and family histories were reviewed and updated in the EHR under History. Social History     Substance and Sexual Activity   Alcohol Use Yes    Alcohol/week: 0.0 standard drinks    Comment: occasional wine       Social History     Substance and Sexual Activity   Drug Use No       Social History     Tobacco Use   Smoking Status Never Smoker   Smokeless Tobacco Never Used           Care Team:  Patient's list of care team members was updated in EHR under the [place]. Neurology- Dr. Kimberly Velasco  Cardiology- Dr. Alejandra Meyer    Immunizations: Reviewed with patient. Health Maintenance Due   Topic Date Due    COVID-19 Vaccine (3 - Booster for [place] series) 07/19/2021    Annual Wellness Visit (AWV)  03/28/2022       CHRONIC CONDITIONS / ACUTE COMPLAINTS   CAD (coronary artery disease)  Retinal microaneurysm  HTN (hypertension)  Diabetic peripheral neuropathy (HCC)  Type 2 diabetes mellitus without complication, without long-term current use of insulin (HCC)  Parkinson's disease ([address]  Arthropathy  Hyperlipemia  Myopathy  Pure hypercholesterolemia  Impotence of organic origin  Inguinal hernia  Tremor of left hand        Physical Exam:    Body mass index is 28.04 kg/m². Vitals:    03/28/22 0743   BP: (!) 130/56   Site: Left Upper Arm   Position: Sitting   Cuff Size: Medium Adult   Pulse: 60   Temp: 97.2 °F (36.2 °C)   SpO2: 97%   Weight: 179 lb (81.2 kg)   Height: 5' 7\" (1.702 m)     Wt Readings from Last 3 Encounters:   03/28/22 179 lb (81.2 kg)   01/11/22 176 lb (79.8 kg)   11/08/21 176 lb 12.8 oz (80.2 kg)       GENERAL:Alert and oriented x 4 NAD, no acute distress, well hydrated, well developed.   LUNG:clear to auscultation bilaterally with normal respiratory effort  CV: Normal heart sounds, regular rate and rhythm without murmurs  EXTREMETY: no loss of hair, no edema, normal pedal pulses bilaterally    Was the timed get up and go unsteady or longer than 20 seconds: No    Vision Screen for Initial Exam: no    EKG for Initial Exam at 71 (): no    AAA U/S screen for men 65-75 who smoked (): not applicable    Assessment/Plan:    Matt Deal was seen today for medicare awv. Diagnoses and all orders for this visit:    Encounter for annual wellness visit (AWV) in Medicare patient  Requested copy of living will  Routine dental and vision  Requested covid booster    Hx of squamous cell carcinoma  -     External Referral To Dermatology    Diabetic peripheral neuropathy ([address]  -     External Referral To Podiatry    Type 2 diabetes mellitus without complication, without long-term current use of insulin ([address]  -     External Referral To Podiatry  -     CBC with Auto Differential; Future  -     Microalbumin / Creatinine Urine Ratio; Future  -     Lipid, Fasting; Future  -     Hemoglobin A1C; Future  -     Comprehensive Metabolic Panel, Fasting; Future    Screening for prostate cancer  -     PSA Screening; Future            Return in about 6 months (around 9/28/2022) for hypertension re-check, lab review, diabetes re-check.

## 2022-03-29 RX ORDER — CELECOXIB 100 MG/1
CAPSULE ORAL
Qty: 90 CAPSULE | Refills: 2 | Status: SHIPPED | OUTPATIENT
Start: 2022-03-29

## 2022-04-24 DIAGNOSIS — E03.9 HYPOTHYROIDISM, UNSPECIFIED TYPE: ICD-10-CM

## 2022-04-26 RX ORDER — LEVOTHYROXINE SODIUM 0.03 MG/1
TABLET ORAL
Qty: 90 TABLET | Refills: 1 | Status: SHIPPED | OUTPATIENT
Start: 2022-04-26 | End: 2022-10-24

## 2022-04-26 RX ORDER — IRBESARTAN 300 MG/1
TABLET ORAL
Qty: 90 TABLET | Refills: 1 | Status: SHIPPED | OUTPATIENT
Start: 2022-04-26 | End: 2022-10-25

## 2022-05-02 NOTE — PROGRESS NOTES
[facility]   Cardiac Follow up    Referring Provider:  VERA Mejia NP     Chief Complaint   Patient presents with    Hypertension    Hyperlipidemia    Coronary Artery Disease    6 Month Follow-Up     History of Present Illness:  Mr. Miguel Lawrence is a 79 y.o. male who has a history of CAD, hypertension, hyperlipidemia and shoulder surgery. Prior to stenting in December 2013, he was short of breath while snow blowing. Previously suffered from rhabdomyolysis on Lipitor (had taken for 15 years prior without problem). Last stress test was stable. He is a retired [employer] . He is a non smoker. Today, he is here for a 6 mos office visit. He states he is \"not doing much. \" He just got a new dog that he is \"trying to break in.\"  Pt denies exertional chest pain, HOLLEY/PND, palpitations, light-headedness, edema. Past Medical History:   has a past medical history of Arthropathy, unspecified, site unspecified, Hypertension, Hypertrophy of prostate without urinary obstruction and other lower urinary tract symptoms (LUTS), Impotence of organic origin, Inguinal hernia, Myalgia and myositis, unspecified, Other and unspecified hyperlipidemia, Retinal microaneurysms NOS, and Type II or unspecified type diabetes mellitus without mention of complication, not stated as uncontrolled. Surgical History:   has a past surgical history that includes Lumbar disc surgery (1998); Cataract removal; Inguinal hernia repair (Left); Coronary angioplasty with stent ([id number]); Skin cancer excision; and shoulder surgery (January 16th 2019). Social History:   reports that he has never smoked. He has never used smokeless tobacco. He reports current alcohol use. He reports that he does not use drugs. Family History:  family history includes Arthritis in his father. Home Medications:  Prior to Admission medications    Medication Sig Start Date End Date Taking?  Authorizing Provider levothyroxine (SYNTHROID) 25 MCG tablet TAKE ONE TABLET BY MOUTH DAILY 4/26/22  Yes Shanaeance Lis, APRN - NP   metFORMIN (GLUCOPHAGE) 500 MG tablet TAKE ONE TABLET BY MOUTH TWICE A DAY WITH MEALS 4/26/22  Yes Lawrance Lis, APRN - NP   irbesartan (AVAPRO) 300 MG tablet TAKE ONE TABLET BY MOUTH DAILY 4/26/22  Yes Lawrance Lis, APRN - NP   celecoxib (CELEBREX) 100 MG capsule TAKE ONE CAPSULE BY MOUTH DAILY 3/29/22  Yes Lawrance Lis, APRN - NP   Insulin Pen Needle ([employer] PEN NEEDLES) 31G X 6 MM MISC USE TWO TIMES A DAY 3/9/22  Yes Lawrance Lis, APRN - NP   hydroCHLOROthiazide (HYDRODIURIL) 25 MG tablet TAKE ONE TABLET BY MOUTH EVERY MORNING 2/1/22  Yes Kim Rodriguez MD   carbidopa-levodopa (SINEMET)  MG per tablet TAKE 1 AND 1/2 TABLET BY MOUTH THREE TIMES A DAY 1/11/22  Yes Ledy Stubbs MD   blood glucose test strips (FREESTYLE LITE) strip USE TO TEST BLOOD SUGAR THREE TIMES A DAY 12/6/21  Yes Lawrance Lis, APRN - NP   rosuvastatin (CRESTOR) 5 MG tablet TAKE ONE TABLET BY MOUTH DAILY 11/30/21  Yes Lawrance Lis, APRN - NP   triamcinolone (KENALOG) 0.1 % ointment APPLY TO AFFECTED AREA(S) TWO TIMES A DAY FOR 7 DAYS 10/19/21  Yes Shanaeance Lis, APRN - NP   clopidogrel (PLAVIX) 75 MG tablet TAKE ONE TABLET BY MOUTH DAILY 8/30/21  Yes Miryam Alfaro MD   insulin lispro protamine & lispro (HUMALOG MIX) (75-25) 100 UNIT per ML SUPN injection pen INJECT 60 UNITS UNDER THE SKIN TWO TIMES A DAY WITH A MEAL 6/6/21  Yes Naila Lis, APRN - NP   metoprolol succinate (TOPROL XL) 25 MG extended release tablet TAKE ONE TABLET BY MOUTH DAILY 6/2/21  Yes Miryam Alfaro MD   mineral oil-hydrophilic petrolatum (AQUAPHOR) ointment Apply topically twice daily 3/2/21  Yes VERA Whelan NP   nitroGLYCERIN (NITROSTAT) 0.4 MG SL tablet Place 1 tablet under the tongue every 5 minutes as needed for Chest pain 4/25/19  Yes Indio Moralez MD   Cholecalciferol (VITAMIN D) 2000 units CAPS capsule Take 2,000 Units by mouth Daily 10/22/18  Yes Indio Moralez MD   Naproxen Sodium (ALEVE) 220 MG CAPS Take 1 tablet by mouth 2 times daily as needed for Pain   Yes Historical Provider, MD   [address] A DAY AS NEEDED 2/6/17  Yes Kaila Gray MD   aspirin 81 MG EC tablet Take 81 mg by mouth daily. Yes Historical Provider, MD      Allergies:  Lipitor, Procardia [nifedipine], Statins, Zestril [lisinopril], and Tetracyclines & related     Review of Systems:   · Constitutional: there has been no unanticipated weight loss. There's been no change in energy level, sleep pattern, or activity level. · Eyes: No visual changes or diplopia. No scleral icterus. · ENT: No Headaches, hearing loss or vertigo. No mouth sores or sore throat. · Cardiovascular: Reviewed in HPI  · Respiratory: No cough or wheezing, no sputum production. No hematemesis. · Gastrointestinal: No abdominal pain, appetite loss, blood in stools. No change in bowel or bladder habits. · Genitourinary: No dysuria, trouble voiding, or hematuria. · Musculoskeletal:  No gait disturbance, weakness or joint complaints. · Integumentary: No rash or pruritis. · Neurological: No headache, diplopia, change in muscle strength, numbness or tingling. No change in gait, balance, coordination, mood, affect, memory, mentation, behavior. · Psychiatric: No anxiety, no depression. · Endocrine: No malaise, fatigue or temperature intolerance. No excessive thirst, fluid intake, or urination. No tremor. · Hematologic/Lymphatic: No abnormal bruising or bleeding, blood clots or swollen lymph nodes. · Allergic/Immunologic: No nasal congestion or hives. Physical Examination:    Blood pressure 138/64, pulse 70, height 5' 8\" (1.727 m), weight 179 lb 3.2 oz (81.3 kg), SpO2 96 %.     Constitutional and General Appearance: No acute distress  Skin:good turgor,intact without lesions  HEENT: EOMI ,normal  Neck:no JVD  Mild lower arm tremors  Respiratory:  · Normal excursion and expansion without use of accessory muscles unchanged exertional shortness of breath  · Resp Auscultation: Normal breath sounds without dullness  Cardiovascular:  · The apical impulses not displaced  · Soft murmur   · Cervical veins are not engorged  · +bruit on exam  · Peripheral pulses are symmetrical and full  · There is no clubbing, cyanosis of the extremities. · Femoral Arteries: 2+ and equal  · Pedal Pulses: 2+ and equal   Abdomen:  · No masses or tenderness  · Liver/Spleen: No Abnormalities Noted  Neurological/Psychiatric:  · Alert and oriented in all spheres  · Moves all extremities well  · Exhibits normal gait balance and coordination  · No abnormalities of mood, affect, memory, mentation, or behavior are noted    ECHO 10/24/18   Normal left ventricle size, wall thickness, and systolic function with an   estimated ejection fraction of 55-60%. No regional wall motion abnormalities   are seen.   Normal diastolic function.   Mild mitral regurgitation is present.   Trivial tricuspid regurgitation. Camille Craig aftab 8/25/16:  Summary    Small-moderate sized inferolateral completely reversible defect consistent  with ischemia in the territory of the distal LCx.       Normal LV size and systolic function.    No change from prior study in 2014. Assessment:  1. CAD (coronary artery disease): Stable, no anginal symptoms.     -Cath 12/2013> 40% proximal RCA,50% PDA,99% prox LAD EF 60%PCI with 3.5 x 15 expedition with excellent result,small diagonal occluded after case without symptoms. -GXT Aftab 12/2014> Small-moderate sized inferolateral completely reversible defect consistent with ischemia in the territory of the distal LCx   -Lexiscan stress 8/25/16> Stable  -ECHO 10/24/18> EF 55-60%      2.  Hypertension:Stable   Blood pressure 138/64, pulse 70, height 5' 8\" (1.727 m), weight 179 lb 3.2 oz (81.3 kg), SpO2 96 %. .   3. Hyperlipemia:  2/25/21> TC 97, TG 53, HDL 50, LDL 36.     3/24/22 TC 97 TG 68 HDL 44 LDL 39  controlled on Crestor 5mg. History of Rhabdomyolysis on Lipitor. 4. Type II or unspecified type diabetes mellitus without mention of complication, not stated as uncontrolled:Stable. Managed per pcp. H/o proteinuria. 5.  Diabetic neuropathy: Stable  6. Carotid Stenosis - Duplex 7/23/20>    There is <50% stenosis of the bilateral internal carotid arteries.    Vertebral flow are antegrade bilaterally     7. Hyperkalemia -  Potassium 2/25/21 5.9- will order STAT K redraw-    EKG 4/28/21> read and interpreted by me > SR 66 . No ECG changes of hyperkalemia  Stat K level 4.7, 9/18/21> 4.5. 3/24/22 K 5.0. Plan:   Cardiac test and lab results personally reviewed by me during this office visit and discussed. No med changes. Will continue with risk factor modifications  Return for regular follow up in 6 mos    I appreciate the opportunity of cooperating in the care of this individual.    Alma Cote M.D., [facility]    Patient's problem list, medications, allergies, past medical, surgical, social and family histories were reviewed and updated as appropriate. Scribe's attestation: This note was scribed in the presence of Dr Chantal Cote by Reynold Crews RN. The scribe's documentation has been prepared under my direction and personally reviewed by me in its entirety. I confirm that the note above accurately reflects all work, treatment, procedures, and medical decision making performed by me.

## 2022-05-09 ENCOUNTER — OFFICE VISIT (OUTPATIENT)
Dept: CARDIOLOGY CLINIC | Age: 82
End: 2022-05-09
Payer: MEDICARE

## 2022-05-09 VITALS
BODY MASS INDEX: 27.16 KG/M2 | SYSTOLIC BLOOD PRESSURE: 138 MMHG | OXYGEN SATURATION: 96 % | DIASTOLIC BLOOD PRESSURE: 64 MMHG | WEIGHT: 179.2 LBS | HEART RATE: 70 BPM | HEIGHT: 68 IN

## 2022-05-09 DIAGNOSIS — E78.49 OTHER HYPERLIPIDEMIA: ICD-10-CM

## 2022-05-09 DIAGNOSIS — I25.10 CORONARY ARTERY DISEASE INVOLVING NATIVE CORONARY ARTERY OF NATIVE HEART WITHOUT ANGINA PECTORIS: Primary | ICD-10-CM

## 2022-05-09 DIAGNOSIS — E11.9 TYPE 2 DIABETES MELLITUS WITHOUT COMPLICATION, WITHOUT LONG-TERM CURRENT USE OF INSULIN (HCC): ICD-10-CM

## 2022-05-09 DIAGNOSIS — I10 PRIMARY HYPERTENSION: ICD-10-CM

## 2022-05-09 PROCEDURE — G8427 DOCREV CUR MEDS BY ELIG CLIN: HCPCS | Performed by: INTERNAL MEDICINE

## 2022-05-09 PROCEDURE — 99214 OFFICE O/P EST MOD 30 MIN: CPT | Performed by: INTERNAL MEDICINE

## 2022-05-09 PROCEDURE — 1123F ACP DISCUSS/DSCN MKR DOCD: CPT | Performed by: INTERNAL MEDICINE

## 2022-05-09 PROCEDURE — 3051F HG A1C>EQUAL 7.0%<8.0%: CPT | Performed by: INTERNAL MEDICINE

## 2022-05-09 PROCEDURE — 4040F PNEUMOC VAC/ADMIN/RCVD: CPT | Performed by: INTERNAL MEDICINE

## 2022-05-09 PROCEDURE — 1036F TOBACCO NON-USER: CPT | Performed by: INTERNAL MEDICINE

## 2022-05-09 PROCEDURE — G8417 CALC BMI ABV UP PARAM F/U: HCPCS | Performed by: INTERNAL MEDICINE

## 2022-05-09 NOTE — PATIENT INSTRUCTIONS
No med changes.    Will continue with risk factor modifications  Return for regular follow up in 6 mos

## 2022-06-06 RX ORDER — METOPROLOL SUCCINATE 25 MG/1
TABLET, EXTENDED RELEASE ORAL
Qty: 90 TABLET | Refills: 8 | Status: SHIPPED | OUTPATIENT
Start: 2022-06-06

## 2022-06-06 NOTE — TELEPHONE ENCOUNTER
Received refill request for Metoprolol from [address] [place] [address].     Last ov: 2022 LES    Last EK2021     Last Refill: 2021 #90 w/ 8 refills    Next appointment: 2022 LES

## 2022-06-13 DIAGNOSIS — E11.42 DM TYPE 2 WITH DIABETIC PERIPHERAL NEUROPATHY (HCC): ICD-10-CM

## 2022-06-13 RX ORDER — BLOOD-GLUCOSE METER
KIT MISCELLANEOUS
Qty: 100 STRIP | Refills: 3 | Status: SHIPPED | OUTPATIENT
Start: 2022-06-13

## 2022-07-07 ENCOUNTER — OFFICE VISIT (OUTPATIENT)
Dept: NEUROLOGY | Age: 82
End: 2022-07-07
Payer: MEDICARE

## 2022-07-07 VITALS
WEIGHT: 179 LBS | DIASTOLIC BLOOD PRESSURE: 63 MMHG | HEIGHT: 68 IN | SYSTOLIC BLOOD PRESSURE: 138 MMHG | BODY MASS INDEX: 27.13 KG/M2 | HEART RATE: 63 BPM

## 2022-07-07 DIAGNOSIS — G20 PARKINSON'S DISEASE (HCC): ICD-10-CM

## 2022-07-07 PROCEDURE — 1123F ACP DISCUSS/DSCN MKR DOCD: CPT | Performed by: PSYCHIATRY & NEUROLOGY

## 2022-07-07 PROCEDURE — 1036F TOBACCO NON-USER: CPT | Performed by: PSYCHIATRY & NEUROLOGY

## 2022-07-07 PROCEDURE — G8417 CALC BMI ABV UP PARAM F/U: HCPCS | Performed by: PSYCHIATRY & NEUROLOGY

## 2022-07-07 PROCEDURE — G8427 DOCREV CUR MEDS BY ELIG CLIN: HCPCS | Performed by: PSYCHIATRY & NEUROLOGY

## 2022-07-07 PROCEDURE — 99213 OFFICE O/P EST LOW 20 MIN: CPT | Performed by: PSYCHIATRY & NEUROLOGY

## 2022-07-07 NOTE — PROGRESS NOTES
Timothy Santillan   Neurology followup    Subjective:   CC/HP  History was obtained from the patient. Patient states the tremors are reasonably well controlled. Patient has mild stiffness. No significant gait difficulties. No side effects to medication. Diabetes is reasonably controlled. His last hemoglobin A1c is 7.1. Detailed history:  Patient complaints of tremors in both his arms. It initially started about a year ago and seems to be slowly getting worse. Onset was gradual.  Symptoms are persistent. No clear aggravating or relieving factors to symptoms. Patient denies any stiffness. He denies any difficulty with his gait   Patient has known diabetes which is well controlled  Patient denies any difficulty getting in and out of a car or rolling over in bed.   Patient complains of some generalized weakness in his legs      REVIEW OF SYSTEMS    Constitutional:  []   Chills   []  Fatigue   []  Fevers   []  Malaise   []  Weight loss     [x] Denies all of the above    Respiratory:   []  Cough    []  Shortness of breath         [x] Denies all of the above     Cardiovascular:   []  Chest pain    []  Exertional chest pressure/discomfort           [] Palpitations    []  Syncope     [x] Denies all of the above        Past Medical History:   Diagnosis Date    Arthropathy, unspecified, site unspecified     Hypertension     Hypertrophy of prostate without urinary obstruction and other lower urinary tract symptoms (LUTS)     Impotence of organic origin     Inguinal hernia     Myalgia and myositis, unspecified     Other and unspecified hyperlipidemia     Retinal microaneurysms NOS     Type II or unspecified type diabetes mellitus without mention of complication, not stated as uncontrolled      Family History   Problem Relation Age of Onset    Arthritis Father     Lupus Neg Hx     Rheum Arthritis Neg Hx     Heart Disease Neg Hx      Social History     Socioeconomic History    Marital status:  Spouse name: Not on file    Number of children: Not on file    Years of education: Not on file    Highest education level: Not on file   Occupational History    Not on file   Tobacco Use    Smoking status: Never Smoker    Smokeless tobacco: Never Used   Vaping Use    Vaping Use: Never used   Substance and Sexual Activity    Alcohol use: Yes     Alcohol/week: 0.0 standard drinks     Comment: occasional wine    Drug use: No    Sexual activity: Yes     Partners: Female   Other Topics Concern    Not on file   Social History Narrative    Not on file     Social Determinants of Health     Financial Resource Strain:     Difficulty of Paying Living Expenses: Not on file   Food Insecurity:     Worried About Running Out of Food in the Last Year: Not on file    Ellen of Food in the Last Year: Not on file   Transportation Needs:     Lack of Transportation (Medical): Not on file    Lack of Transportation (Non-Medical):  Not on file   Physical Activity:     Days of Exercise per Week: Not on file    Minutes of Exercise per Session: Not on file   Stress:     Feeling of Stress : Not on file   Social Connections:     Frequency of Communication with Friends and Family: Not on file    Frequency of Social Gatherings with Friends and Family: Not on file    Attends Druze Services: Not on file    Active Member of [address] [place] [address] [employer] or Organizations: Not on file    Attends Club or Organization Meetings: Not on file    Marital Status: Not on file   Intimate Partner Violence:     Fear of Current or Ex-Partner: Not on file    Emotionally Abused: Not on file    Physically Abused: Not on file    Sexually Abused: Not on file   Housing Stability:     Unable to Pay for Housing in the Last Year: Not on file    Number of [place] in the Last Year: Not on file    Unstable Housing in the Last Year: Not on file        Objective:  Exam:  /63   Pulse 63   Ht 5' 8\" (1.727 m)   Wt 179 lb (81.2 kg)   BMI 27.22 kg/m² This is a well-nourished patient in no acute distress  Patient is awake, alert and oriented x3. Speech is normal.  Pupils are equal round reacting to light. Extraocular movements intact. Face symmetrical. Tongue midline. Motor exam shows normal symmetrical strength. Cogwheel rigidity and pill-rolling tremors present in both arms. Deep tendon reflexes decreased in the legs. Plantar reflexes downgoing. Sensory exam showed decreased light touch in the distal lower extremities. Coordination normal. Gait shows mild ataxia. No carotid bruit. No neck stiffness. Data :  LABS:  General Labs:    CBC:   Lab Results   Component Value Date/Time    WBC 7.1 03/24/2022 09:08 AM    RBC 4.49 03/24/2022 09:08 AM    HGB 13.1 03/24/2022 09:08 AM    HCT 39.6 03/24/2022 09:08 AM    MCV 88.3 03/24/2022 09:08 AM    MCH 29.2 03/24/2022 09:08 AM    MCHC 33.1 03/24/2022 09:08 AM    RDW 15.5 03/24/2022 09:08 AM     03/24/2022 09:08 AM    MPV 7.7 03/24/2022 09:08 AM     BMP:    Lab Results   Component Value Date/Time     03/24/2022 09:08 AM    K 5.0 03/24/2022 09:08 AM     03/24/2022 09:08 AM    CO2 22 03/24/2022 09:08 AM    BUN 30 03/24/2022 09:08 AM    LABALBU 4.4 03/24/2022 09:08 AM    CREATININE 1.1 03/24/2022 09:08 AM    CALCIUM 9.3 03/24/2022 09:08 AM    GFRAA >60 03/24/2022 09:08 AM    GFRAA >60 11/02/2012 07:46 AM    LABGLOM >60 03/24/2022 09:08 AM    GLUCOSE 130 07/30/2019 08:08 AM       Impression :  Parkinson's disease, doing fairly well  Tremor, improved with increased dose of Sinemet  Ataxia  Diabetic peripheral neuropathy, stable    Plan :  Discussed with patient   Continue Sinemet 25/100, one and one half tablet 3 times daily  Side effects were discussed. Continue with aggressive control of diabetes  Return in 6 months          Please note a portion of  this chart was generated using dragon dictation software.  Although every effort was made to ensure the accuracy of this automated transcription, some

## 2022-07-27 RX ORDER — HYDROCHLOROTHIAZIDE 25 MG/1
TABLET ORAL
Qty: 90 TABLET | Refills: 1 | Status: SHIPPED | OUTPATIENT
Start: 2022-07-27

## 2022-08-02 RX ORDER — PEN NEEDLE, DIABETIC 31 G X1/4"
NEEDLE, DISPOSABLE MISCELLANEOUS
Qty: 100 EACH | Refills: 1 | Status: SHIPPED | OUTPATIENT
Start: 2022-08-02

## 2022-08-11 DIAGNOSIS — G20 PARKINSON'S DISEASE (HCC): ICD-10-CM

## 2022-08-11 RX ORDER — INSULIN LISPRO 100 [IU]/ML
INJECTION, SUSPENSION SUBCUTANEOUS
Qty: 75 ML | Refills: 2 | Status: SHIPPED | OUTPATIENT
Start: 2022-08-11

## 2022-08-12 NOTE — TELEPHONE ENCOUNTER
Medication:   Requested Prescriptions     Pending Prescriptions Disp Refills    carbidopa-levodopa (SINEMET)  MG per tablet [Pharmacy Med Name: CARBIDOPA-LEVODOPA  TAB] 405 tablet 1     Sig: TAKE 1 AND 1/2 TABLET BY MOUTH THREE TIMES A DAY        Last Filled:      Patient Phone Number: [phone] (home)     Last appt: 7/7/2022   Next appt: 1/12/2023    Last OARRS: No flowsheet data found.

## 2022-08-25 RX ORDER — ROSUVASTATIN CALCIUM 5 MG/1
TABLET, COATED ORAL
Qty: 90 TABLET | Refills: 2 | Status: SHIPPED | OUTPATIENT
Start: 2022-08-25

## 2022-09-26 ENCOUNTER — HOSPITAL ENCOUNTER (OUTPATIENT)
Age: 82
Discharge: HOME | End: 2022-09-26
Payer: MEDICARE

## 2022-09-26 DIAGNOSIS — E11.9 TYPE 2 DIABETES MELLITUS WITHOUT COMPLICATION, WITHOUT LONG-TERM CURRENT USE OF INSULIN (HCC): ICD-10-CM

## 2022-09-26 DIAGNOSIS — Z12.5 SCREENING FOR PROSTATE CANCER: ICD-10-CM

## 2022-09-26 LAB
A/G RATIO: 1.5 (ref 1.1–2.2)
ALBUMIN SERPL-MCNC: 4.5 G/DL (ref 3.4–5)
ALP BLD-CCNC: 102 U/L (ref 40–129)
ALT SERPL-CCNC: <5 U/L (ref 10–40)
ANION GAP SERPL CALCULATED.3IONS-SCNC: 15 MMOL/L (ref 3–16)
AST SERPL-CCNC: 10 U/L (ref 15–37)
BASOPHILS ABSOLUTE: 0.1 K/UL (ref 0–0.2)
BASOPHILS RELATIVE PERCENT: 1.2 %
BILIRUB SERPL-MCNC: 1.1 MG/DL (ref 0–1)
BUN BLDV-MCNC: 24 MG/DL (ref 7–20)
CALCIUM SERPL-MCNC: 9.4 MG/DL (ref 8.3–10.6)
CHLORIDE BLD-SCNC: 100 MMOL/L (ref 99–110)
CHOLESTEROL, FASTING: 106 MG/DL (ref 0–199)
CO2: 25 MMOL/L (ref 21–32)
CREAT SERPL-MCNC: 1.2 MG/DL (ref 0.8–1.3)
CREATININE URINE: 104.7 MG/DL (ref 39–259)
EOSINOPHILS ABSOLUTE: 0.4 K/UL (ref 0–0.6)
EOSINOPHILS RELATIVE PERCENT: 5.4 %
GFR AFRICAN AMERICAN: >60
GFR NON-AFRICAN AMERICAN: 58
GLUCOSE FASTING: 107 MG/DL (ref 70–99)
HCT VFR BLD CALC: 41.9 % (ref 40.5–52.5)
HDLC SERPL-MCNC: 47 MG/DL (ref 40–60)
HEMOGLOBIN: 13.8 G/DL (ref 13.5–17.5)
LDL CHOLESTEROL CALCULATED: 45 MG/DL
LYMPHOCYTES ABSOLUTE: 1.2 K/UL (ref 1–5.1)
LYMPHOCYTES RELATIVE PERCENT: 15.9 %
MCH RBC QN AUTO: 28.8 PG (ref 26–34)
MCHC RBC AUTO-ENTMCNC: 32.9 G/DL (ref 31–36)
MCV RBC AUTO: 87.3 FL (ref 80–100)
MICROALBUMIN UR-MCNC: 7.5 MG/DL
MICROALBUMIN/CREAT UR-RTO: 71.6 MG/G (ref 0–30)
MONOCYTES ABSOLUTE: 0.8 K/UL (ref 0–1.3)
MONOCYTES RELATIVE PERCENT: 10.7 %
NEUTROPHILS ABSOLUTE: 4.9 K/UL (ref 1.7–7.7)
NEUTROPHILS RELATIVE PERCENT: 66.8 %
PDW BLD-RTO: 15.4 % (ref 12.4–15.4)
PLATELET # BLD: 271 K/UL (ref 135–450)
PMV BLD AUTO: 7.8 FL (ref 5–10.5)
POTASSIUM SERPL-SCNC: 4.4 MMOL/L (ref 3.5–5.1)
PROSTATE SPECIFIC ANTIGEN: 7.25 NG/ML (ref 0–4)
RBC # BLD: 4.8 M/UL (ref 4.2–5.9)
SODIUM BLD-SCNC: 140 MMOL/L (ref 136–145)
TOTAL PROTEIN: 7.6 G/DL (ref 6.4–8.2)
TRIGLYCERIDE, FASTING: 69 MG/DL (ref 0–150)
VLDLC SERPL CALC-MCNC: 14 MG/DL
WBC # BLD: 7.4 K/UL (ref 4–11)

## 2022-09-26 PROCEDURE — 85025 COMPLETE CBC W/AUTO DIFF WBC: CPT

## 2022-09-26 PROCEDURE — 82043 UR ALBUMIN QUANTITATIVE: CPT

## 2022-09-26 PROCEDURE — 84153 ASSAY OF PSA TOTAL: CPT

## 2022-09-26 PROCEDURE — 80061 LIPID PANEL: CPT

## 2022-09-26 PROCEDURE — 36415 COLL VENOUS BLD VENIPUNCTURE: CPT

## 2022-09-26 PROCEDURE — 80053 COMPREHEN METABOLIC PANEL: CPT

## 2022-09-26 PROCEDURE — 83036 HEMOGLOBIN GLYCOSYLATED A1C: CPT

## 2022-09-26 PROCEDURE — 82570 ASSAY OF URINE CREATININE: CPT

## 2022-09-27 LAB
ESTIMATED AVERAGE GLUCOSE: 168.6 MG/DL
HBA1C MFR BLD: 7.5 %

## 2022-09-29 ENCOUNTER — OFFICE VISIT (OUTPATIENT)
Dept: FAMILY MEDICINE CLINIC | Age: 82
End: 2022-09-29
Payer: MEDICARE

## 2022-09-29 ENCOUNTER — TELEPHONE (OUTPATIENT)
Dept: FAMILY MEDICINE CLINIC | Age: 82
End: 2022-09-29

## 2022-09-29 VITALS
TEMPERATURE: 98 F | HEART RATE: 95 BPM | DIASTOLIC BLOOD PRESSURE: 72 MMHG | BODY MASS INDEX: 26.15 KG/M2 | OXYGEN SATURATION: 96 % | WEIGHT: 172 LBS | SYSTOLIC BLOOD PRESSURE: 126 MMHG

## 2022-09-29 DIAGNOSIS — I10 PRIMARY HYPERTENSION: ICD-10-CM

## 2022-09-29 DIAGNOSIS — E11.42 DM TYPE 2 WITH DIABETIC PERIPHERAL NEUROPATHY (HCC): Primary | ICD-10-CM

## 2022-09-29 DIAGNOSIS — Z23 FLU VACCINE NEED: ICD-10-CM

## 2022-09-29 PROCEDURE — 3051F HG A1C>EQUAL 7.0%<8.0%: CPT | Performed by: NURSE PRACTITIONER

## 2022-09-29 PROCEDURE — 1036F TOBACCO NON-USER: CPT | Performed by: NURSE PRACTITIONER

## 2022-09-29 PROCEDURE — 90694 VACC AIIV4 NO PRSRV 0.5ML IM: CPT | Performed by: NURSE PRACTITIONER

## 2022-09-29 PROCEDURE — 99214 OFFICE O/P EST MOD 30 MIN: CPT | Performed by: NURSE PRACTITIONER

## 2022-09-29 PROCEDURE — G0008 ADMIN INFLUENZA VIRUS VAC: HCPCS | Performed by: NURSE PRACTITIONER

## 2022-09-29 PROCEDURE — G8427 DOCREV CUR MEDS BY ELIG CLIN: HCPCS | Performed by: NURSE PRACTITIONER

## 2022-09-29 PROCEDURE — G8417 CALC BMI ABV UP PARAM F/U: HCPCS | Performed by: NURSE PRACTITIONER

## 2022-09-29 PROCEDURE — 1123F ACP DISCUSS/DSCN MKR DOCD: CPT | Performed by: NURSE PRACTITIONER

## 2022-09-29 SDOH — ECONOMIC STABILITY: FOOD INSECURITY: WITHIN THE PAST 12 MONTHS, YOU WORRIED THAT YOUR FOOD WOULD RUN OUT BEFORE YOU GOT MONEY TO BUY MORE.: NEVER TRUE

## 2022-09-29 SDOH — ECONOMIC STABILITY: FOOD INSECURITY: WITHIN THE PAST 12 MONTHS, THE FOOD YOU BOUGHT JUST DIDN'T LAST AND YOU DIDN'T HAVE MONEY TO GET MORE.: NEVER TRUE

## 2022-09-29 ASSESSMENT — PATIENT HEALTH QUESTIONNAIRE - PHQ9
2. FEELING DOWN, DEPRESSED OR HOPELESS: 0
SUM OF ALL RESPONSES TO PHQ QUESTIONS 1-9: 0
1. LITTLE INTEREST OR PLEASURE IN DOING THINGS: 0
SUM OF ALL RESPONSES TO PHQ QUESTIONS 1-9: 0
SUM OF ALL RESPONSES TO PHQ9 QUESTIONS 1 & 2: 0

## 2022-09-29 ASSESSMENT — SOCIAL DETERMINANTS OF HEALTH (SDOH): HOW HARD IS IT FOR YOU TO PAY FOR THE VERY BASICS LIKE FOOD, HOUSING, MEDICAL CARE, AND HEATING?: NOT VERY HARD

## 2022-09-29 NOTE — PROGRESS NOTES
Sandro Weaver  : 1940  Encounter date: 2022    This jackson 79 y.o. male who presents with  Chief Complaint   Patient presents with    Diabetes    Hypertension       History of present illness:    HPI Pt is 79year old male for diabetes and hypertension follow up. No new concerns. Recent labs showed well controlled A1c- 7.5, down from 7.8. PSA level- 7.25, unable to compare to previous lab values, denies seeing urology, denies symptoms of nocturnal urination, excessive or difficulty starting/stopping stream.  Pt established with podiatry, toenail trimming and will be fitted for diabetic shoes. Pt being seen by Dr. Toney Castañeda for Parkinsons only, not being followed for diabetic neuropathy. BP well controlled. No new concerns.     Current Outpatient Medications on File Prior to Visit   Medication Sig Dispense Refill    rosuvastatin (CRESTOR) 5 MG tablet TAKE ONE TABLET BY MOUTH DAILY 90 tablet 2    carbidopa-levodopa (SINEMET)  MG per tablet TAKE 1 AND 1/2 TABLET BY MOUTH THREE TIMES A  tablet 1    insulin lispro protamine & lispro (HUMALOG MIX) (75-25) 100 UNIT per ML SUPN injection pen INJECT 60 UNITS UNDER THE SKIN TWO TIMES A DAY WITH A MEAL 75 mL 2    [employer] PEN NEEDLES 31G X 6 MM MISC USE TWO TIMES A  each 1    hydroCHLOROthiazide (HYDRODIURIL) 25 MG tablet TAKE ONE TABLET BY MOUTH EVERY MORNING 90 tablet 1    FREESTYLE LITE strip USE TO TEST BLOOD SUGAR THREE TIMES A  strip 3    metoprolol succinate (TOPROL XL) 25 MG extended release tablet TAKE ONE TABLET BY MOUTH DAILY 90 tablet 8    levothyroxine (SYNTHROID) 25 MCG tablet TAKE ONE TABLET BY MOUTH DAILY 90 tablet 1    metFORMIN (GLUCOPHAGE) 500 MG tablet TAKE ONE TABLET BY MOUTH TWICE A DAY WITH MEALS 180 tablet 1    irbesartan (AVAPRO) 300 MG tablet TAKE ONE TABLET BY MOUTH DAILY 90 tablet 1    celecoxib (CELEBREX) 100 MG capsule TAKE ONE CAPSULE BY MOUTH DAILY 90 capsule 2    triamcinolone (KENALOG) 0.1 % ointment APPLY TO AFFECTED AREA(S) TWO TIMES A DAY FOR 7 DAYS 30 g 3    clopidogrel (PLAVIX) 75 MG tablet TAKE ONE TABLET BY MOUTH DAILY 90 tablet 3    mineral oil-hydrophilic petrolatum (AQUAPHOR) ointment Apply topically twice daily 50 g 0    nitroGLYCERIN (NITROSTAT) 0.4 MG SL tablet Place 1 tablet under the tongue every 5 minutes as needed for Chest pain 25 tablet 1    Cholecalciferol (VITAMIN D) 2000 units CAPS capsule Take 2,000 Units by mouth Daily 30 capsule 5    Naproxen Sodium 220 MG CAPS Take 1 tablet by mouth 2 times daily as needed for Pain      ROM MICROLET LANCETS MISC TEST BLOOD SUGAR THREE TIMES A DAY AS NEEDED 100 each 11    aspirin 81 MG EC tablet Take 81 mg by mouth daily. No current facility-administered medications on file prior to visit. Allergies   Allergen Reactions    Lipitor     Procardia [Nifedipine]     Statins Other (See Comments)     Myalgias: severe reaction.    Now taking vitamin D and can take low dose statin    Zestril [Lisinopril] Swelling     Ankles ~ 20 yrs ago    Tetracyclines & Related Rash     Past Medical History:   Diagnosis Date    Arthropathy, unspecified, site unspecified     Hypertension     Hypertrophy of prostate without urinary obstruction and other lower urinary tract symptoms (LUTS)     Impotence of organic origin     Inguinal hernia     Myalgia and myositis, unspecified     Other and unspecified hyperlipidemia     Retinal microaneurysms NOS     Type II or unspecified type diabetes mellitus without mention of complication, not stated as uncontrolled       Past Surgical History:   Procedure Laterality Date    CATARACT REMOVAL      bilateral    CORONARY ANGIOPLASTY WITH STENT PLACEMENT  [id number]    INGUINAL HERNIA REPAIR Left     LUMBAR [place] SURGERY  January 16th 2019    left shoulder replacement     SKIN CANCER EXCISION      top of head      Family History   Problem Relation Age of Onset    Arthritis Father     Lupus Neg Hx     Rheum Arthritis Neg Hx     Heart Disease Neg Hx       Social History     Tobacco Use    Smoking status: Never    Smokeless tobacco: Never   Substance Use Topics    Alcohol use: Yes     Alcohol/week: 0.0 standard drinks     Comment: occasional wine        Review of Systems    Objective:    /72 (Site: Left Upper Arm, Position: Sitting, Cuff Size: Medium Adult)   Pulse 95   Temp 98 °F (36.7 °C) (Infrared)   Wt 172 lb (78 kg)   SpO2 96%   BMI 26.15 kg/m²   Weight: 172 lb (78 kg)     BP Readings from Last 3 Encounters:   22 126/72   22 138/63   22 138/64     Wt Readings from Last 3 Encounters:   22 172 lb (78 kg)   22 179 lb (81.2 kg)   22 179 lb 3.2 oz (81.3 kg)     BMI Readings from Last 3 Encounters:   22 26.15 kg/m²   22 27.22 kg/m²   22 27.25 kg/m²       Physical Exam  Vitals reviewed. Constitutional:       Appearance: Normal appearance. He is well-developed. Cardiovascular:      Rate and Rhythm: Normal rate and regular rhythm. Pulses: Normal pulses. Heart sounds: Normal heart sounds. No murmur heard. Pulmonary:      Effort: Pulmonary effort is normal.      Breath sounds: Normal breath sounds. Musculoskeletal:      Right lower leg: No edema. Left lower leg: No edema. Skin:     General: Skin is warm and dry. Neurological:      Mental Status: He is alert and oriented to person, place, and time. Psychiatric:         Mood and Affect: Mood normal.     Visual inspection:  Deformity/amputation: absent  Skin lesions/pre-ulcerative calluses: absent  Edema: right- negative, left- negative    Sensory exam:  Monofilament sensation: abnormal - decreased bilateral  (minimum of 5 random plantar locations tested, avoiding callused areas - > 1 area with absence of sensation is + for neuropathy)    Plus at least one of the following:  Pulses: normal,   Pinprick:   Proprioception: Impaired  Vibration (128 Hz): N/A    Assessment/Plan    1.  DM type 2 with diabetic peripheral neuropathy ([facility] [address]  Controlled  Follow up podiatry  Advised diabetic retinal exam  -  DIABETES FOOT EXAM    2. Primary hypertension  controlled    3. Flu vaccine need  ADministered  - Influenza, FLUAD, (age 72 y+), IM, PF, 0.5 mL    Return in about 6 months (around 3/29/2023) for annual check up, hypertension re-check, diabetes re-check. This dictation was generated by voice recognition computer software. Although all attempts are made to edit the dictation for accuracy, there may be errors in the transcription that are not intended.

## 2022-09-29 NOTE — TELEPHONE ENCOUNTER
Patient was wondering if you would accept him on as a new patient. He currently see SW but want to keep the same doctor within the family. His daughter, Cabrera Sharma, is currently a patient with you and it would make it easier for them. No problems or concerns with SW just want to stay within. Please advise!

## 2022-10-24 DIAGNOSIS — E03.9 HYPOTHYROIDISM, UNSPECIFIED TYPE: ICD-10-CM

## 2022-10-24 RX ORDER — LEVOTHYROXINE SODIUM 0.03 MG/1
TABLET ORAL
Qty: 90 TABLET | Refills: 1 | Status: SHIPPED | OUTPATIENT
Start: 2022-10-24

## 2022-10-25 RX ORDER — IRBESARTAN 300 MG/1
TABLET ORAL
Qty: 90 TABLET | Refills: 1 | Status: SHIPPED | OUTPATIENT
Start: 2022-10-25

## 2022-12-05 RX ORDER — CELECOXIB 100 MG/1
CAPSULE ORAL
Qty: 90 CAPSULE | Refills: 2 | Status: SHIPPED | OUTPATIENT
Start: 2022-12-05

## 2022-12-08 ENCOUNTER — TELEPHONE (OUTPATIENT)
Dept: FAMILY MEDICINE CLINIC | Age: 82
End: 2022-12-08

## 2022-12-08 DIAGNOSIS — U07.1 COVID: Primary | ICD-10-CM

## 2022-12-08 NOTE — TELEPHONE ENCOUNTER
Patient tested positive for covid today. Symptoms started yesterday. Wanting to know if Paxlovid can be sent to the Pharmacy.      Symptoms Include:    Cough  Running nose  Tired       Pharmacy    [facility] [id number] [address], [address] [place] [address]   Phone:  [phone]  Fax:  [phone]

## 2023-01-12 ENCOUNTER — OFFICE VISIT (OUTPATIENT)
Dept: NEUROLOGY | Age: 83
End: 2023-01-12
Payer: MEDICARE

## 2023-01-12 VITALS
BODY MASS INDEX: 24.4 KG/M2 | DIASTOLIC BLOOD PRESSURE: 70 MMHG | HEIGHT: 68 IN | HEART RATE: 85 BPM | SYSTOLIC BLOOD PRESSURE: 136 MMHG | WEIGHT: 161 LBS

## 2023-01-12 DIAGNOSIS — G20 PARKINSON'S DISEASE (HCC): Primary | ICD-10-CM

## 2023-01-12 DIAGNOSIS — R27.0 ATAXIA: ICD-10-CM

## 2023-01-12 DIAGNOSIS — E11.42 DIABETIC PERIPHERAL NEUROPATHY (HCC): ICD-10-CM

## 2023-01-12 PROCEDURE — 1036F TOBACCO NON-USER: CPT | Performed by: PSYCHIATRY & NEUROLOGY

## 2023-01-12 PROCEDURE — 3075F SYST BP GE 130 - 139MM HG: CPT | Performed by: PSYCHIATRY & NEUROLOGY

## 2023-01-12 PROCEDURE — G8427 DOCREV CUR MEDS BY ELIG CLIN: HCPCS | Performed by: PSYCHIATRY & NEUROLOGY

## 2023-01-12 PROCEDURE — 99214 OFFICE O/P EST MOD 30 MIN: CPT | Performed by: PSYCHIATRY & NEUROLOGY

## 2023-01-12 PROCEDURE — 1123F ACP DISCUSS/DSCN MKR DOCD: CPT | Performed by: PSYCHIATRY & NEUROLOGY

## 2023-01-12 PROCEDURE — 3078F DIAST BP <80 MM HG: CPT | Performed by: PSYCHIATRY & NEUROLOGY

## 2023-01-12 PROCEDURE — G8420 CALC BMI NORM PARAMETERS: HCPCS | Performed by: PSYCHIATRY & NEUROLOGY

## 2023-01-12 PROCEDURE — G8484 FLU IMMUNIZE NO ADMIN: HCPCS | Performed by: PSYCHIATRY & NEUROLOGY

## 2023-01-12 NOTE — PROGRESS NOTES
Chyna Muse   Neurology followup    Subjective:   CC/HP  History was obtained from the patient. Patient has known Parkinson's disease. His symptoms seem to be slightly worse with increased tremors in the last few months. Patient has been under a lot of stress. His wife had heart surgery at [facility] and is now slowly recovering. Patient has not been eating well and has lost some weight as well. Patient has mild stiffness. No significant gait difficulties. No side effects to medication. Diabetes is reasonably controlled. His last hemoglobin A1c is 7.1. Detailed history:  Patient complaints of tremors in both his arms. It initially started about a year ago and seems to be slowly getting worse. Onset was gradual.  Symptoms are persistent. No clear aggravating or relieving factors to symptoms. Patient denies any stiffness. He denies any difficulty with his gait   Patient has known diabetes which is well controlled  Patient denies any difficulty getting in and out of a car or rolling over in bed.   Patient complains of some generalized weakness in his legs      REVIEW OF SYSTEMS    Constitutional:  []   Chills   []  Fatigue   []  Fevers   []  Malaise   []  Weight loss     [x] Denies all of the above    Respiratory:   []  Cough    []  Shortness of breath         [x] Denies all of the above     Cardiovascular:   []  Chest pain    []  Exertional chest pressure/discomfort           [] Palpitations    []  Syncope     [x] Denies all of the above        Past Medical History:   Diagnosis Date    Arthropathy, unspecified, site unspecified     Hypertension     Hypertrophy of prostate without urinary obstruction and other lower urinary tract symptoms (LUTS)     Impotence of organic origin     Inguinal hernia     Myalgia and myositis, unspecified     Other and unspecified hyperlipidemia     Retinal microaneurysms NOS     Type II or unspecified type diabetes mellitus without mention of complication, not stated as uncontrolled      Family History   Problem Relation Age of Onset    Arthritis Father     Lupus Neg Hx     Rheum Arthritis Neg Hx     Heart Disease Neg Hx      Social History     Socioeconomic History    Marital status:      Spouse name: None    Number of children: None    Years of education: None    Highest education level: None   Tobacco Use    Smoking status: Never    Smokeless tobacco: Never   Vaping Use    Vaping Use: Never used   Substance and Sexual Activity    Alcohol use: Yes     Alcohol/week: 0.0 standard drinks     Comment: occasional wine    Drug use: No    Sexual activity: Yes     Partners: Female     Social Determinants of Health     Financial Resource Strain: Low Risk     Difficulty of Paying Living Expenses: Not very hard   Food Insecurity: No Food Insecurity    Worried About Running Out of Food in the Last Year: Never true    Ran Out of Food in the Last Year: Never true        Objective:  Exam:  /70   Pulse 85   Ht 5' 8\" (1.727 m)   Wt 161 lb (73 kg)   BMI 24.48 kg/m²   This is a well-nourished patient in no acute distress  Patient is awake, alert and oriented x3. Speech is normal.  Pupils are equal round reacting to light. Extraocular movements intact. Face symmetrical. Tongue midline. Motor exam shows normal symmetrical strength. Cogwheel rigidity and pill-rolling tremors present in both arms. Deep tendon reflexes decreased in the legs. Plantar reflexes downgoing. Sensory exam showed decreased light touch in the distal lower extremities. Coordination normal. Gait shows mild ataxia. No carotid bruit. No neck stiffness.       Data :  LABS:  General Labs:    CBC:   Lab Results   Component Value Date/Time    WBC 7.4 09/26/2022 09:08 AM    RBC 4.80 09/26/2022 09:08 AM    HGB 13.8 09/26/2022 09:08 AM    HCT 41.9 09/26/2022 09:08 AM    MCV 87.3 09/26/2022 09:08 AM    MCH 28.8 09/26/2022 09:08 AM    MCHC 32.9 09/26/2022 09:08 AM    RDW 15.4 09/26/2022 09:08 AM     09/26/2022 09:08 AM    MPV 7.8 09/26/2022 09:08 AM     BMP:    Lab Results   Component Value Date/Time     09/26/2022 09:08 AM    K 4.4 09/26/2022 09:08 AM     09/26/2022 09:08 AM    CO2 25 09/26/2022 09:08 AM    BUN 24 09/26/2022 09:08 AM    LABALBU 4.5 09/26/2022 09:08 AM    CREATININE 1.2 09/26/2022 09:08 AM    CALCIUM 9.4 09/26/2022 09:08 AM    GFRAA >60 09/26/2022 09:08 AM    GFRAA >60 11/02/2012 07:46 AM    LABGLOM 58 09/26/2022 09:08 AM    GLUCOSE 130 07/30/2019 08:08 AM       Impression :  Parkinson's disease, symptoms slightly worse now with increased stress. Tremor, slightly worse recently. Ataxia  Diabetic peripheral neuropathy, stable    Plan :  Discussed with patient   Continue Sinemet 25/100, one and one half tablet 3 times daily  Side effects were discussed. We discussed about the role of stress with his increased Parkinson symptoms. Continue with aggressive control of diabetes  I will see him back in 3 months. If his symptoms not improved then I will consider further increasing the Sinemet dose. Please note a portion of  this chart was generated using dragon dictation software. Although every effort was made to ensure the accuracy of this automated transcription, some errors in transcription may have occurred.

## 2023-01-20 DIAGNOSIS — E11.42 DM TYPE 2 WITH DIABETIC PERIPHERAL NEUROPATHY (HCC): ICD-10-CM

## 2023-01-20 RX ORDER — BLOOD-GLUCOSE METER
KIT MISCELLANEOUS
Qty: 100 STRIP | Refills: 3 | Status: SHIPPED | OUTPATIENT
Start: 2023-01-20

## 2023-01-23 RX ORDER — HYDROCHLOROTHIAZIDE 25 MG/1
TABLET ORAL
Qty: 90 TABLET | Refills: 1 | Status: SHIPPED | OUTPATIENT
Start: 2023-01-23

## 2023-01-23 NOTE — TELEPHONE ENCOUNTER
Medication:   Requested Prescriptions     Pending Prescriptions Disp Refills    hydroCHLOROthiazide (HYDRODIURIL) 25 MG tablet [Pharmacy Med Name: hydroCHLOROthiazide 25 MG TABLET] 90 tablet 1     Sig: TAKE ONE TABLET  [facility] MORNING          Patient Phone Number: [phone] (home)     Last appt: 9/29/2022   Next appt: 3/30/2023    Last OARRS: No flowsheet data found.   PDMP Monitoring:    Last PDMP Romel Lewis as Reviewed [facility]):  Review User Review Instant Review Result          Preferred Pharmacy:   [facility] [id number] [place] [address]  [address] [place] [address]  Phone: [phone] Fax: [phone]

## 2023-02-08 DIAGNOSIS — G20 PARKINSON'S DISEASE (HCC): ICD-10-CM

## 2023-02-13 RX ORDER — PEN NEEDLE, DIABETIC 31 G X1/4"
NEEDLE, DISPOSABLE MISCELLANEOUS
Qty: 100 EACH | Refills: 2 | Status: SHIPPED | OUTPATIENT
Start: 2023-02-13

## 2023-02-13 NOTE — TELEPHONE ENCOUNTER
Medication:   Requested Prescriptions     Pending Prescriptions Disp Refills    [employer] PEN NEEDLES 31G X 6 MM MISC [Pharmacy Med Name: Reese Pérez NEEDLE 6MM X 31G]       Sig: USE TWO TIMES A DAY          Patient Phone Number: [phone] (home)     Last appt: 9/29/2022   Next appt: 3/30/2023    Last OARRS: No flowsheet data found.   PDMP Monitoring:    Last PDMP Rebecca Baron as Reviewed [facility]):  Review User Review Instant Review Result          Preferred Pharmacy:   [facility] [id number] [address]  [address] [facility]  Phone: [phone] Fax: [phone]

## 2023-03-03 DIAGNOSIS — E11.42 DM TYPE 2 WITH DIABETIC PERIPHERAL NEUROPATHY (HCC): Primary | ICD-10-CM

## 2023-04-21 RX ORDER — IRBESARTAN 300 MG/1
TABLET ORAL
Qty: 90 TABLET | Refills: 1 | Status: SHIPPED | OUTPATIENT
Start: 2023-04-21

## 2023-04-21 NOTE — TELEPHONE ENCOUNTER
Medication:   Requested Prescriptions     Pending Prescriptions Disp Refills    irbesartan (AVAPRO) 300 MG tablet [Pharmacy Med Name: IRBESARTAN 300 MG TABLET] 90 tablet 1     Sig: TAKE ONE TABLET BY MOUTH DAILY      Provider out of office. Patient Phone Number: [phone] (home)     Last appt: 9/29/2022   Next appt: 5/4/2023    Last OARRS: No flowsheet data found.   PDMP Monitoring:    Last PDMP Tana Yoder as Reviewed [facility]):  Review User Review Instant Review Result          Preferred Pharmacy:   [facility] [id number] [address]  [address] [place] [address]  Phone: [phone] Fax: [phone]

## 2023-05-25 ENCOUNTER — HOSPITAL ENCOUNTER (OUTPATIENT)
Age: 83
Discharge: HOME | End: 2023-05-25
Payer: MEDICARE

## 2023-05-25 DIAGNOSIS — E11.42 DM TYPE 2 WITH DIABETIC PERIPHERAL NEUROPATHY (HCC): ICD-10-CM

## 2023-05-25 LAB
ANION GAP SERPL CALCULATED.3IONS-SCNC: 11 MMOL/L (ref 3–16)
BUN SERPL-MCNC: 38 MG/DL (ref 7–20)
CALCIUM SERPL-MCNC: 9.4 MG/DL (ref 8.3–10.6)
CHLORIDE SERPL-SCNC: 103 MMOL/L (ref 99–110)
CO2 SERPL-SCNC: 25 MMOL/L (ref 21–32)
CREAT SERPL-MCNC: 1.2 MG/DL (ref 0.8–1.3)
EST. AVERAGE GLUCOSE BLD GHB EST-MCNC: 171.4 MG/DL
GFR SERPLBLD CREATININE-BSD FMLA CKD-EPI: 60 ML/MIN/{1.73_M2}
GLUCOSE SERPL-MCNC: 163 MG/DL (ref 70–99)
HBA1C MFR BLD: 7.6 %
POTASSIUM SERPL-SCNC: 4.8 MMOL/L (ref 3.5–5.1)
SODIUM SERPL-SCNC: 139 MMOL/L (ref 136–145)

## 2023-05-25 PROCEDURE — 80048 BASIC METABOLIC PNL TOTAL CA: CPT

## 2023-05-25 PROCEDURE — 83036 HEMOGLOBIN GLYCOSYLATED A1C: CPT

## 2023-05-25 PROCEDURE — 36415 COLL VENOUS BLD VENIPUNCTURE: CPT

## 2023-05-30 NOTE — PROGRESS NOTES
Saira Abdi is a 79 y.o. male who presents for follow-up of Type 2 diabetes mellitus and to establish care with me. Former SW pt. Current medication use: taking as prescribed (metformin and insulin)  Eye exam current (within one year): no, thinks has upcoming appointment in July  Current diet: on average, 2-3 meals per day, with diet in the middle , states hasn't been as good with stress with wife's illness and recent death. Current exercise:not much  Sees podiatrist: yes- for toenail cutting and DM - Dr. Nixon  sugars at home: Yes - TID  Hypoglycemia symptoms: No  AM Fastin-150      Doesn't have any numbness or pain in feet. States can't feel the monofilament but doesn't bother him. Checks BP at home:   occasionally, nothing recent  Current medication use: taking as prescribed (metoprolol)      Patient's medications, allergies, past medical, surgical, social and family histories were reviewed and updated in the EHR as appropriate. Body mass index is 27.41 kg/m². Vitals:    23 1108   BP: (!) 120/50   Site: Left Upper Arm   Position: Sitting   Cuff Size: Medium Adult   Pulse: 81   Temp: 98.4 °F (36.9 °C)   TempSrc: Infrared   SpO2: 97%   Weight: 169 lb 12.8 oz (77 kg)   Height: 5' 6\" (1.676 m)     Wt Readings from Last 3 Encounters:   23 169 lb 12.8 oz (77 kg)   23 161 lb (73 kg)   23 161 lb (73 kg)       GENERAL:Alert and oriented x 4 NAD, affect appropriate and overweight, well hydrated, well developed.   LUNG:clear to auscultation bilaterally with normal respiratory effort and good air movement  CV: Normal heart sounds, regular rate and rhythm without murmurs    LE Edema: abnormal - 2+ pitting bilaterally,    Top of scalp with several AKs and few lesions suspicious for skin cancer, see pictures in [facility] Scores 2023 2022 3/28/2022 3/1/2021 2019 2018 2016   PHQ2 Score 1 0 0 0 0 0 0   PHQ9 Score 1 0 0 0 0 0 0

## 2023-06-01 ENCOUNTER — OFFICE VISIT (OUTPATIENT)
Dept: FAMILY MEDICINE CLINIC | Age: 83
End: 2023-06-01

## 2023-06-01 VITALS
HEIGHT: 66 IN | BODY MASS INDEX: 27.29 KG/M2 | TEMPERATURE: 98.4 F | SYSTOLIC BLOOD PRESSURE: 120 MMHG | OXYGEN SATURATION: 97 % | HEART RATE: 81 BPM | DIASTOLIC BLOOD PRESSURE: 50 MMHG | WEIGHT: 169.8 LBS

## 2023-06-01 DIAGNOSIS — E11.22 TYPE 2 DIABETES MELLITUS WITH STAGE 3A CHRONIC KIDNEY DISEASE, WITHOUT LONG-TERM CURRENT USE OF INSULIN (HCC): ICD-10-CM

## 2023-06-01 DIAGNOSIS — E11.65 TYPE 2 DIABETES MELLITUS WITH HYPERGLYCEMIA, WITH LONG-TERM CURRENT USE OF INSULIN (HCC): Primary | ICD-10-CM

## 2023-06-01 DIAGNOSIS — E11.42 DIABETIC PERIPHERAL NEUROPATHY (HCC): ICD-10-CM

## 2023-06-01 DIAGNOSIS — Z79.4 TYPE 2 DIABETES MELLITUS WITH HYPERGLYCEMIA, WITH LONG-TERM CURRENT USE OF INSULIN (HCC): Primary | ICD-10-CM

## 2023-06-01 DIAGNOSIS — I10 PRIMARY HYPERTENSION: ICD-10-CM

## 2023-06-01 DIAGNOSIS — H35.30 MACULAR DEGENERATION, UNSPECIFIED LATERALITY, UNSPECIFIED TYPE: ICD-10-CM

## 2023-06-01 DIAGNOSIS — N18.31 TYPE 2 DIABETES MELLITUS WITH STAGE 3A CHRONIC KIDNEY DISEASE, WITHOUT LONG-TERM CURRENT USE OF INSULIN (HCC): ICD-10-CM

## 2023-06-01 DIAGNOSIS — E03.9 HYPOTHYROIDISM, UNSPECIFIED TYPE: ICD-10-CM

## 2023-06-01 DIAGNOSIS — C44.40 SKIN CANCER OF SCALP: ICD-10-CM

## 2023-06-01 PROBLEM — R25.1 TREMOR OF LEFT HAND: Status: RESOLVED | Noted: 2017-06-19 | Resolved: 2023-06-01

## 2023-06-01 RX ORDER — LEVOTHYROXINE SODIUM 0.03 MG/1
25 TABLET ORAL DAILY
Qty: 90 TABLET | Refills: 1 | Status: SHIPPED | OUTPATIENT
Start: 2023-06-01

## 2023-06-01 RX ORDER — INSULIN LISPRO 100 [IU]/ML
INJECTION, SUSPENSION SUBCUTANEOUS
Qty: 75 ML | Refills: 2 | Status: SHIPPED | OUTPATIENT
Start: 2023-06-01

## 2023-06-01 SDOH — ECONOMIC STABILITY: FOOD INSECURITY: WITHIN THE PAST 12 MONTHS, THE FOOD YOU BOUGHT JUST DIDN'T LAST AND YOU DIDN'T HAVE MONEY TO GET MORE.: NEVER TRUE

## 2023-06-01 SDOH — ECONOMIC STABILITY: INCOME INSECURITY: HOW HARD IS IT FOR YOU TO PAY FOR THE VERY BASICS LIKE FOOD, HOUSING, MEDICAL CARE, AND HEATING?: NOT HARD AT ALL

## 2023-06-01 SDOH — ECONOMIC STABILITY: FOOD INSECURITY: WITHIN THE PAST 12 MONTHS, YOU WORRIED THAT YOUR FOOD WOULD RUN OUT BEFORE YOU GOT MONEY TO BUY MORE.: NEVER TRUE

## 2023-06-01 SDOH — ECONOMIC STABILITY: HOUSING INSECURITY
IN THE LAST 12 MONTHS, WAS THERE A TIME WHEN YOU DID NOT HAVE A STEADY PLACE TO SLEEP OR SLEPT IN A SHELTER (INCLUDING NOW)?: NO

## 2023-06-01 ASSESSMENT — PATIENT HEALTH QUESTIONNAIRE - PHQ9
1. LITTLE INTEREST OR PLEASURE IN DOING THINGS: 0
SUM OF ALL RESPONSES TO PHQ QUESTIONS 1-9: 1
SUM OF ALL RESPONSES TO PHQ QUESTIONS 1-9: 1
2. FEELING DOWN, DEPRESSED OR HOPELESS: 1
SUM OF ALL RESPONSES TO PHQ QUESTIONS 1-9: 1
SUM OF ALL RESPONSES TO PHQ9 QUESTIONS 1 & 2: 1
SUM OF ALL RESPONSES TO PHQ QUESTIONS 1-9: 1

## 2023-06-01 NOTE — PATIENT INSTRUCTIONS
--Dermatologists:      [facility]ology  [phone]   MD Kacey Nevarez MD Mitzi Peeks, MD Jorje Eaton, MD Hall Mooring, MD        [facility]  [address], [address]  Phone: [phone]       [facility]  Dr. Bj Hyde  [phone]  [address] [place], [place]       [facility]  Dr. Ellen Angel MD  [phone] 205 [address]      Dermatology and Surgery  Cristal Ramírez MD  [phone]  [address] [place] [address] [address], [address] [place] [address], [place] [facility]      [facility]  Marco Moran, [address] [facility], [address]  [phone]

## 2023-06-02 RX ORDER — ROSUVASTATIN CALCIUM 5 MG/1
TABLET, COATED ORAL
Qty: 90 TABLET | Refills: 3 | Status: SHIPPED | OUTPATIENT
Start: 2023-06-02

## 2023-06-02 NOTE — TELEPHONE ENCOUNTER
Medication:   Requested Prescriptions     Pending Prescriptions Disp Refills    rosuvastatin (CRESTOR) 5 MG tablet [Pharmacy Med Name: ROSUVASTATIN CALCIUM 5 MG TAB] 90 tablet 2     Sig: TAKE ONE TABLET BY MOUTH DAILY          Patient Phone Number: [phone] (home)     Last appt: 6/1/2023   Next appt: 9/1/2023    Last OARRS: No flowsheet data found.   PDMP Monitoring:    Last PDMP Loida Toscano as Reviewed [facility]):  Review User Review Instant Review Result          Preferred Pharmacy:   [facility] [id number] [address]  [address] [facility]  Phone: [phone] Fax: [phone]

## 2023-06-26 ENCOUNTER — TELEPHONE (OUTPATIENT)
Dept: FAMILY MEDICINE CLINIC | Age: 83
End: 2023-06-26

## 2023-06-30 ENCOUNTER — OFFICE VISIT (OUTPATIENT)
Dept: FAMILY MEDICINE CLINIC | Age: 83
End: 2023-06-30

## 2023-06-30 ENCOUNTER — TELEPHONE (OUTPATIENT)
Dept: FAMILY MEDICINE CLINIC | Age: 83
End: 2023-06-30

## 2023-06-30 VITALS
BODY MASS INDEX: 27.18 KG/M2 | TEMPERATURE: 98.1 F | DIASTOLIC BLOOD PRESSURE: 64 MMHG | SYSTOLIC BLOOD PRESSURE: 146 MMHG | OXYGEN SATURATION: 98 % | WEIGHT: 168.4 LBS | HEART RATE: 76 BPM

## 2023-06-30 DIAGNOSIS — L03.119 CELLULITIS OF LOWER LEG: ICD-10-CM
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
## 2023-06-30 DIAGNOSIS — N17.9 ACUTE RENAL FAILURE, UNSPECIFIED ACUTE RENAL FAILURE TYPE (HCC): ICD-10-CM

## 2023-06-30 DIAGNOSIS — R09.02 HYPOXIA: ICD-10-CM

## 2023-06-30 DIAGNOSIS — S01.81XD FACIAL LACERATION, SUBSEQUENT ENCOUNTER: ICD-10-CM

## 2023-06-30 DIAGNOSIS — D64.9 ANEMIA, UNSPECIFIED TYPE: ICD-10-CM

## 2023-06-30 DIAGNOSIS — J81.0 ACUTE PULMONARY EDEMA (HCC): ICD-10-CM

## 2023-06-30 DIAGNOSIS — Z91.148 NON COMPLIANCE W MEDICATION REGIMEN: ICD-10-CM
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
## 2023-06-30 PROBLEM — S01.81XA LACERATION OF FACE: Status: ACTIVE | Noted: 2023-06-30

## 2023-06-30 PROBLEM — S01.81XA LACERATION OF FACE: Status: RESOLVED | Noted: 2023-06-30 | Resolved: 2023-06-30

## 2023-06-30 PROBLEM — E11.9 TYPE 2 DIABETES MELLITUS WITHOUT COMPLICATION, WITHOUT LONG-TERM CURRENT USE OF INSULIN (HCC): Status: RESOLVED | Noted: 2020-07-20 | Resolved: 2023-06-30

## 2023-06-30 RX ORDER — CEFDINIR 300 MG/1
300 CAPSULE ORAL 2 TIMES DAILY
Qty: 14 CAPSULE | Refills: 0 | Status: SHIPPED | OUTPATIENT
Start: 2023-06-30 | End: 2023-07-07

## 2023-06-30 RX ORDER — FUROSEMIDE 20 MG/1
20 TABLET ORAL DAILY
Qty: 30 TABLET | Refills: 0 | Status: SHIPPED | OUTPATIENT
Start: 2023-06-30

## 2023-06-30 RX ORDER — DOXYCYCLINE HYCLATE 100 MG
100 TABLET ORAL 2 TIMES DAILY
Qty: 14 TABLET | Refills: 0 | Status: SHIPPED | OUTPATIENT
Start: 2023-06-30 | End: 2023-06-30

## 2023-07-06 ENCOUNTER — TELEPHONE (OUTPATIENT)
Dept: FAMILY MEDICINE CLINIC | Age: 83
End: 2023-07-06

## 2023-07-06 ENCOUNTER — HOSPITAL ENCOUNTER (OUTPATIENT)
Age: 83
Discharge: HOME | End: 2023-07-06
Payer: MEDICARE

## 2023-07-06 DIAGNOSIS — N28.9 ABNORMAL KIDNEY FUNCTION: Primary | ICD-10-CM

## 2023-07-06 DIAGNOSIS — D64.9 ANEMIA, UNSPECIFIED TYPE: ICD-10-CM

## 2023-07-06 DIAGNOSIS — N17.9 ACUTE RENAL FAILURE, UNSPECIFIED ACUTE RENAL FAILURE TYPE (HCC): ICD-10-CM

## 2023-07-06 LAB
ANION GAP SERPL CALCULATED.3IONS-SCNC: 10 MMOL/L (ref 3–16)
BUN SERPL-MCNC: 62 MG/DL (ref 7–20)
CALCIUM SERPL-MCNC: 9.4 MG/DL (ref 8.3–10.6)
CHLORIDE SERPL-SCNC: 102 MMOL/L (ref 99–110)
CO2 SERPL-SCNC: 28 MMOL/L (ref 21–32)
CREAT SERPL-MCNC: 1.9 MG/DL (ref 0.8–1.3)
DEPRECATED RDW RBC AUTO: 16.8 % (ref 12.4–15.4)
GFR SERPLBLD CREATININE-BSD FMLA CKD-EPI: 34 ML/MIN/{1.73_M2}
GLUCOSE SERPL-MCNC: 172 MG/DL (ref 70–99)
HCT VFR BLD AUTO: 37.2 % (ref 40.5–52.5)
HGB BLD-MCNC: 12.5 G/DL (ref 13.5–17.5)
IRON SATN MFR SERPL: 27 % (ref 20–50)
IRON SERPL-MCNC: 77 UG/DL (ref 59–158)
MCH RBC QN AUTO: 29 PG (ref 26–34)
MCHC RBC AUTO-ENTMCNC: 33.6 G/DL (ref 31–36)
MCV RBC AUTO: 86.6 FL (ref 80–100)
PLATELET # BLD AUTO: 367 K/UL (ref 135–450)
PMV BLD AUTO: 7.9 FL (ref 5–10.5)
POTASSIUM SERPL-SCNC: 5.3 MMOL/L (ref 3.5–5.1)
RBC # BLD AUTO: 4.29 M/UL (ref 4.2–5.9)
SODIUM SERPL-SCNC: 140 MMOL/L (ref 136–145)
TIBC SERPL-MCNC: 281 UG/DL (ref 260–445)
WBC # BLD AUTO: 8 K/UL (ref 4–11)

## 2023-07-06 PROCEDURE — 83540 ASSAY OF IRON: CPT

## 2023-07-06 PROCEDURE — 85027 COMPLETE CBC AUTOMATED: CPT

## 2023-07-06 PROCEDURE — 80048 BASIC METABOLIC PNL TOTAL CA: CPT

## 2023-07-06 PROCEDURE — 83550 IRON BINDING TEST: CPT

## 2023-07-06 PROCEDURE — 36415 COLL VENOUS BLD VENIPUNCTURE: CPT

## 2023-07-06 RX ORDER — DOXYCYCLINE HYCLATE 100 MG
100 TABLET ORAL 2 TIMES DAILY
Qty: 14 TABLET | Refills: 0 | OUTPATIENT
Start: 2023-07-06 | End: 2023-07-13

## 2023-07-06 NOTE — TELEPHONE ENCOUNTER
----- Message from Carl Moscoso MD sent at 7/6/2023  4:21 PM EDT -----  Blood count better and iron levels ok    Kidneys more stressed - likely due to the lasix I gave him  Increase fluids over next few days  Recheck BMP on Monday

## 2023-07-10 ENCOUNTER — HOSPITAL ENCOUNTER (EMERGENCY)
Age: 83
Discharge: HOME | End: 2023-07-10
Attending: EMERGENCY MEDICINE
Payer: MEDICARE

## 2023-07-10 ENCOUNTER — HOSPITAL ENCOUNTER (OUTPATIENT)
Age: 83
Discharge: HOME | End: 2023-07-10
Payer: MEDICARE

## 2023-07-10 VITALS
WEIGHT: 168 LBS | RESPIRATION RATE: 20 BRPM | SYSTOLIC BLOOD PRESSURE: 160 MMHG | HEIGHT: 66 IN | OXYGEN SATURATION: 96 % | TEMPERATURE: 98.1 F | DIASTOLIC BLOOD PRESSURE: 54 MMHG | BODY MASS INDEX: 27 KG/M2 | HEART RATE: 69 BPM

## 2023-07-10 DIAGNOSIS — R79.89 ELEVATED SERUM CREATININE: Primary | ICD-10-CM

## 2023-07-10 DIAGNOSIS — N28.9 ABNORMAL KIDNEY FUNCTION: ICD-10-CM

## 2023-07-10 LAB
ALBUMIN SERPL-MCNC: 4 G/DL (ref 3.4–5)
ALBUMIN/GLOB SERPL: 1 {RATIO} (ref 1.1–2.2)
ALP SERPL-CCNC: 79 U/L (ref 40–129)
ALT SERPL-CCNC: <5 U/L (ref 10–40)
ANION GAP SERPL CALCULATED.3IONS-SCNC: 10 MMOL/L (ref 3–16)
ANION GAP SERPL CALCULATED.3IONS-SCNC: 15 MMOL/L (ref 3–16)
AST SERPL-CCNC: 16 U/L (ref 15–37)
BACTERIA URNS QL MICRO: NORMAL /HPF
BASOPHILS # BLD: 0.1 K/UL (ref 0–0.2)
BASOPHILS NFR BLD: 1 %
BILIRUB SERPL-MCNC: 0.8 MG/DL (ref 0–1)
BILIRUB UR QL STRIP.AUTO: NEGATIVE
BUN SERPL-MCNC: 64 MG/DL (ref 7–20)
BUN SERPL-MCNC: 68 MG/DL (ref 7–20)
CALCIUM SERPL-MCNC: 9.1 MG/DL (ref 8.3–10.6)
CALCIUM SERPL-MCNC: 9.7 MG/DL (ref 8.3–10.6)
CHLORIDE SERPL-SCNC: 101 MMOL/L (ref 99–110)
CHLORIDE SERPL-SCNC: 103 MMOL/L (ref 99–110)
CLARITY UR: CLEAR
CO2 SERPL-SCNC: 20 MMOL/L (ref 21–32)
CO2 SERPL-SCNC: 26 MMOL/L (ref 21–32)
COLOR UR: YELLOW
CREAT SERPL-MCNC: 1.8 MG/DL (ref 0.8–1.3)
CREAT SERPL-MCNC: 1.8 MG/DL (ref 0.8–1.3)
DEPRECATED RDW RBC AUTO: 16.8 % (ref 12.4–15.4)
EOSINOPHIL # BLD: 0.5 K/UL (ref 0–0.6)
EOSINOPHIL NFR BLD: 6 %
EPI CELLS #/AREA URNS AUTO: 0 /HPF (ref 0–5)
GFR SERPLBLD CREATININE-BSD FMLA CKD-EPI: 37 ML/MIN/{1.73_M2}
GFR SERPLBLD CREATININE-BSD FMLA CKD-EPI: 37 ML/MIN/{1.73_M2}
GLUCOSE SERPL-MCNC: 154 MG/DL (ref 70–99)
GLUCOSE SERPL-MCNC: 176 MG/DL (ref 70–99)
GLUCOSE UR STRIP.AUTO-MCNC: NEGATIVE MG/DL
HCT VFR BLD AUTO: 37.8 % (ref 40.5–52.5)
HGB BLD-MCNC: 12.3 G/DL (ref 13.5–17.5)
HGB UR QL STRIP.AUTO: NEGATIVE
HYALINE CASTS #/AREA URNS AUTO: 0 /LPF (ref 0–8)
KETONES UR STRIP.AUTO-MCNC: NEGATIVE MG/DL
LEUKOCYTE ESTERASE UR QL STRIP.AUTO: NEGATIVE
LYMPHOCYTES # BLD: 1.2 K/UL (ref 1–5.1)
LYMPHOCYTES NFR BLD: 14.9 %
MCH RBC QN AUTO: 28 PG (ref 26–34)
MCHC RBC AUTO-ENTMCNC: 32.5 G/DL (ref 31–36)
MCV RBC AUTO: 86.2 FL (ref 80–100)
MONOCYTES # BLD: 0.9 K/UL (ref 0–1.3)
MONOCYTES NFR BLD: 11.4 %
NEUTROPHILS # BLD: 5.3 K/UL (ref 1.7–7.7)
NEUTROPHILS NFR BLD: 66.7 %
NITRITE UR QL STRIP.AUTO: NEGATIVE
PH UR STRIP.AUTO: 5.5 [PH] (ref 5–8)
PLATELET # BLD AUTO: 374 K/UL (ref 135–450)
PMV BLD AUTO: 7.8 FL (ref 5–10.5)
POTASSIUM SERPL-SCNC: 4.4 MMOL/L (ref 3.5–5.1)
POTASSIUM SERPL-SCNC: 5.6 MMOL/L (ref 3.5–5.1)
PROT SERPL-MCNC: 7.9 G/DL (ref 6.4–8.2)
PROT UR STRIP.AUTO-MCNC: ABNORMAL MG/DL
RBC # BLD AUTO: 4.38 M/UL (ref 4.2–5.9)
RBC CLUMPS #/AREA URNS AUTO: 0 /HPF (ref 0–4)
SODIUM SERPL-SCNC: 137 MMOL/L (ref 136–145)
SODIUM SERPL-SCNC: 138 MMOL/L (ref 136–145)
SP GR UR STRIP.AUTO: 1.01 (ref 1–1.03)
UA COMPLETE W REFLEX CULTURE PNL UR: ABNORMAL
UA DIPSTICK W REFLEX MICRO PNL UR: YES
URN SPEC COLLECT METH UR: ABNORMAL
UROBILINOGEN UR STRIP-ACNC: 1 E.U./DL
WBC # BLD AUTO: 7.9 K/UL (ref 4–11)
WBC #/AREA URNS AUTO: 0 /HPF (ref 0–5)

## 2023-07-10 PROCEDURE — 93005 ELECTROCARDIOGRAM TRACING: CPT | Performed by: NURSE PRACTITIONER

## 2023-07-10 PROCEDURE — 85025 COMPLETE CBC W/AUTO DIFF WBC: CPT

## 2023-07-10 PROCEDURE — 80053 COMPREHEN METABOLIC PANEL: CPT

## 2023-07-10 PROCEDURE — 81001 URINALYSIS AUTO W/SCOPE: CPT

## 2023-07-10 PROCEDURE — 99284 EMERGENCY DEPT VISIT MOD MDM: CPT

## 2023-07-10 PROCEDURE — 36415 COLL VENOUS BLD VENIPUNCTURE: CPT

## 2023-07-10 RX ORDER — HYDRALAZINE HYDROCHLORIDE 25 MG/1
25 TABLET, FILM COATED ORAL 2 TIMES DAILY
Qty: 90 TABLET | Refills: 0 | Status: SHIPPED | OUTPATIENT
Start: 2023-07-10

## 2023-07-10 ASSESSMENT — ENCOUNTER SYMPTOMS
CHEST TIGHTNESS: 0
ABDOMINAL PAIN: 0
SHORTNESS OF BREATH: 0
VOMITING: 0
NAUSEA: 0
DIARRHEA: 0

## 2023-07-10 NOTE — DISCHARGE INSTRUCTIONS
Please return if you have any new, worsening, or concerning symptoms like inability to eat/drink/walk, fevers, chest pain, trouble breathing    Contact your primary care physician tomorrow to make a follow up appointment this week. Talk about getting an echocardiogram of your heart.

## 2023-07-10 NOTE — ED PROVIDER NOTES
I was available for consultation during patient's ED stay. Patient was cared for by CONNER. I did not evaluate or participate in patient care. EKG Interpretation    Interpreted by emergency department physician    Rhythm: normal sinus   Rate: normal  Axis: left  Ectopy: none  Conduction: normal  ST Segments: nonspecific changes  T Waves: non specific changes  Q Waves: none    Clinical Impression: Normal sinus rhythm with left axis deviation and signs of left ventricular hypertrophy. PA interval 202 QRS duration is normal normal QT QTc. Nonspecific septal changes relatively unchanged from previous EKG dated April 28, 2021. Interpreted by myself.     MD Hannah Cohen MD  07/10/23 189

## 2023-07-10 NOTE — ED PROVIDER NOTES
I independently performed a history and physical on Werner Arriaga. I personally saw the patient and performed a substantive portion of the visit including all aspects of the medical decision making. Briefly, this is a 79 y.o. male here for abnormal lab value. He was having routine lab draw performed after being hospitalized for fall, pulmonary edema. No prior history of CHF. He was discharged with 3 days of Lasix. He has no chest pain or shortness of breath. He has no symptoms at this time. His strength is at his baseline. His leg swelling is unchanged. He has gained 2 pounds since 2 weeks ago. He is having frequent urination. He does take hydrochlorothiazide but does not take any more Lasix. He has been having cough that is unchanged over the past few weeks. .    On exam,   General: Patient is in no acute distress  Skin: No cyanosis  HEENT: Moist mucous membranes  Heart: Regular rate, regular rhythm. 2+ pitting edema to calves  Lung: No respiratory distress. Rales b/l   Abdomen: Soft, nontender  Neuro: Moving all extremities, no facial droop, no slurred speech, answers questions appropriately    F/u this week  Hold losart and hctz hold lasix  Add hydal 25mg bid  Zuleymameek     EKG  The Ekg interpreted by me in the absence of a cardiologist shows. Normal sinus rhythm  No acute ST changes   HR 74  lvh  No significant EKG changes compared to study in 4/21      Screenings            MDM  Briefly, this is a 79 y.o. male here for abnormal lab value. He is asymptomatic. He is mildly hypertensive to 160/50. Creatinine elevated to 1.8. Earlier this week it was at 1.9. BUN is elevated. More likely this is prerenal.  He does have some pitting edema to the calves as well as rhonchi however this has been ongoing for weeks and he is not short of breath. Was recently discharged from [facility] for pulmonary edema.   He could benefit from getting an echocardiogram.  At this point, I would say that

## 2023-07-11 LAB
EKG ATRIAL RATE: 74 BPM
EKG DIAGNOSIS: NORMAL
EKG P AXIS: 67 DEGREES
EKG P-R INTERVAL: 202 MS
EKG Q-T INTERVAL: 388 MS
EKG QRS DURATION: 128 MS
EKG QTC CALCULATION (BAZETT): 430 MS
EKG R AXIS: -53 DEGREES
EKG T AXIS: 79 DEGREES
EKG VENTRICULAR RATE: 74 BPM

## 2023-07-11 PROCEDURE — 93010 ELECTROCARDIOGRAM REPORT: CPT | Performed by: INTERNAL MEDICINE

## 2023-07-11 NOTE — ED PROVIDER NOTES
[facility]        Pt Name: Rand Blas  MRN: [id number]  [address] 1940  Date of evaluation: 7/10/2023  Provider: VERA Allen CNP  PCP: Elina Childress MD  Note Started: 8:10 PM EDT 7/10/23       I have seen and evaluated this patient with my supervising physician Matthew Pérez MD.      CHIEF COMPLAINT       Chief Complaint   Patient presents with    Abnormal Lab     Pt via kid from home, sent by MD due to abnormal kidney levels, no hx of kidney failure, pt was admitted for respiratory failure and pulmonary edema on 6/22       HISTORY OF PRESENT ILLNESS: 1 or more Elements     History from : Patient    Limitations to history : None    Rand Blas is a 79 y.o. male who presents to the emergency department with abnormal labs. The patient was sent in by primary care. Reports that he had labs completed on 7/5/2023 which were abnormal and then repeated yesterday, again abnormal.  The patient reports that his kidney function and potassium were abnormal.    Patient was hospitalized at [facility] with head injury and was started on Lasix for concern of congestive heart failure. The patient has no history of congestive heart failure. He was diuresed and discharged home. He does continue to have bilateral leg swelling and some rhonchi noted on respiratory exam, the patient reports that he is not short of breath. He has gained 2 pounds over the past 2 weeks    Denies any headache, fever, lightheadedness, dizziness, visual disturbances. No chest pain or pressure. No neck or back pain. No cough, or congestion. No abdominal pain, nausea, vomiting, diarrhea, constipation, or dysuria. No rash. Nursing Notes were all reviewed and agreed with or any disagreements were addressed in the HPI. REVIEW OF SYSTEMS :      Review of Systems   Constitutional:  Positive for unexpected weight change.  Negative for activity

## 2023-07-13 ENCOUNTER — HOSPITAL ENCOUNTER (OUTPATIENT)
Age: 83
Discharge: HOME | End: 2023-07-13
Payer: MEDICARE

## 2023-07-13 DIAGNOSIS — N18.9 ANEMIA IN CHRONIC KIDNEY DISEASE, UNSPECIFIED CKD STAGE: ICD-10-CM

## 2023-07-13 DIAGNOSIS — D63.1 ANEMIA IN CHRONIC KIDNEY DISEASE, UNSPECIFIED CKD STAGE: ICD-10-CM

## 2023-07-13 LAB
ALBUMIN SERPL-MCNC: 4.3 G/DL (ref 3.4–5)
ANION GAP SERPL CALCULATED.3IONS-SCNC: 11 MMOL/L (ref 3–16)
BACTERIA URNS QL MICRO: NORMAL /HPF
BILIRUB UR QL STRIP.AUTO: NEGATIVE
BUN SERPL-MCNC: 51 MG/DL (ref 7–20)
CALCIUM SERPL-MCNC: 9.6 MG/DL (ref 8.3–10.6)
CHLORIDE SERPL-SCNC: 98 MMOL/L (ref 99–110)
CLARITY UR: CLEAR
CO2 SERPL-SCNC: 26 MMOL/L (ref 21–32)
COLOR UR: YELLOW
CREAT SERPL-MCNC: 1.7 MG/DL (ref 0.8–1.3)
CREAT UR-MCNC: 55 MG/DL (ref 39–259)
DEPRECATED RDW RBC AUTO: 16.6 % (ref 12.4–15.4)
EPI CELLS #/AREA URNS AUTO: 0 /HPF (ref 0–5)
GFR SERPLBLD CREATININE-BSD FMLA CKD-EPI: 39 ML/MIN/{1.73_M2}
GLUCOSE SERPL-MCNC: 145 MG/DL (ref 70–99)
GLUCOSE UR STRIP.AUTO-MCNC: NEGATIVE MG/DL
HCT VFR BLD AUTO: 37.4 % (ref 40.5–52.5)
HGB BLD-MCNC: 12.5 G/DL (ref 13.5–17.5)
HGB UR QL STRIP.AUTO: NEGATIVE
HYALINE CASTS #/AREA URNS AUTO: 0 /LPF (ref 0–8)
KETONES UR STRIP.AUTO-MCNC: NEGATIVE MG/DL
LEUKOCYTE ESTERASE UR QL STRIP.AUTO: NEGATIVE
MCH RBC QN AUTO: 28.7 PG (ref 26–34)
MCHC RBC AUTO-ENTMCNC: 33.3 G/DL (ref 31–36)
MCV RBC AUTO: 86.1 FL (ref 80–100)
MICROALBUMIN UR DL<=1MG/L-MCNC: 2.9 MG/DL
MICROALBUMIN/CREAT UR: 52.7 MG/G (ref 0–30)
NITRITE UR QL STRIP.AUTO: NEGATIVE
PH UR STRIP.AUTO: 6 [PH] (ref 5–8)
PHOSPHATE SERPL-MCNC: 3.6 MG/DL (ref 2.5–4.9)
PLATELET # BLD AUTO: 373 K/UL (ref 135–450)
PMV BLD AUTO: 7.4 FL (ref 5–10.5)
POTASSIUM SERPL-SCNC: 4.8 MMOL/L (ref 3.5–5.1)
PROT UR STRIP.AUTO-MCNC: NEGATIVE MG/DL
PROT UR-MCNC: 10 MG/DL
PROT/CREAT UR-RTO: 0.2 MG/DL
RBC # BLD AUTO: 4.34 M/UL (ref 4.2–5.9)
RBC CLUMPS #/AREA URNS AUTO: 0 /HPF (ref 0–4)
SODIUM SERPL-SCNC: 135 MMOL/L (ref 136–145)
SP GR UR STRIP.AUTO: 1.01 (ref 1–1.03)
UA DIPSTICK W REFLEX MICRO PNL UR: NORMAL
URN SPEC COLLECT METH UR: NORMAL
UROBILINOGEN UR STRIP-ACNC: 1 E.U./DL
WBC # BLD AUTO: 8.2 K/UL (ref 4–11)
WBC #/AREA URNS AUTO: 1 /HPF (ref 0–5)

## 2023-07-13 PROCEDURE — 82043 UR ALBUMIN QUANTITATIVE: CPT

## 2023-07-13 PROCEDURE — 83880 ASSAY OF NATRIURETIC PEPTIDE: CPT

## 2023-07-13 PROCEDURE — 36415 COLL VENOUS BLD VENIPUNCTURE: CPT

## 2023-07-13 PROCEDURE — 85027 COMPLETE CBC AUTOMATED: CPT

## 2023-07-13 PROCEDURE — 82570 ASSAY OF URINE CREATININE: CPT

## 2023-07-13 PROCEDURE — 80069 RENAL FUNCTION PANEL: CPT

## 2023-07-13 PROCEDURE — 84156 ASSAY OF PROTEIN URINE: CPT

## 2023-07-13 PROCEDURE — 81001 URINALYSIS AUTO W/SCOPE: CPT

## 2023-07-14 ENCOUNTER — HOSPITAL ENCOUNTER (OUTPATIENT)
Age: 83
Discharge: HOME | End: 2023-07-14
Payer: MEDICARE

## 2023-07-14 ENCOUNTER — OFFICE VISIT (OUTPATIENT)
Dept: FAMILY MEDICINE CLINIC | Age: 83
End: 2023-07-14

## 2023-07-14 ENCOUNTER — HOSPITAL ENCOUNTER (OUTPATIENT)
Dept: GENERAL RADIOLOGY | Age: 83
Discharge: HOME | End: 2023-07-14
Payer: MEDICARE

## 2023-07-14 VITALS
WEIGHT: 166.2 LBS | SYSTOLIC BLOOD PRESSURE: 125 MMHG | TEMPERATURE: 97.9 F | OXYGEN SATURATION: 98 % | HEART RATE: 64 BPM | DIASTOLIC BLOOD PRESSURE: 57 MMHG | BODY MASS INDEX: 26.83 KG/M2

## 2023-07-14 DIAGNOSIS — Z79.4 TYPE 2 DIABETES MELLITUS WITH HYPERGLYCEMIA, WITH LONG-TERM CURRENT USE OF INSULIN (HCC): Primary | ICD-10-CM

## 2023-07-14 DIAGNOSIS — I50.9 ACUTE CONGESTIVE HEART FAILURE, UNSPECIFIED HEART FAILURE TYPE (HCC): ICD-10-CM

## 2023-07-14 DIAGNOSIS — E11.65 TYPE 2 DIABETES MELLITUS WITH HYPERGLYCEMIA, WITH LONG-TERM CURRENT USE OF INSULIN (HCC): Primary | ICD-10-CM

## 2023-07-14 DIAGNOSIS — J81.0 ACUTE PULMONARY EDEMA (HCC): ICD-10-CM

## 2023-07-14 DIAGNOSIS — Z79.4 TYPE 2 DIABETES MELLITUS WITH STAGE 3B CHRONIC KIDNEY DISEASE, WITH LONG-TERM CURRENT USE OF INSULIN (HCC): ICD-10-CM

## 2023-07-14 DIAGNOSIS — R05.2 SUBACUTE COUGH: ICD-10-CM

## 2023-07-14 DIAGNOSIS — E11.22 TYPE 2 DIABETES MELLITUS WITH STAGE 3B CHRONIC KIDNEY DISEASE, WITH LONG-TERM CURRENT USE OF INSULIN (HCC): ICD-10-CM

## 2023-07-14 DIAGNOSIS — N18.32 TYPE 2 DIABETES MELLITUS WITH STAGE 3B CHRONIC KIDNEY DISEASE, WITH LONG-TERM CURRENT USE OF INSULIN (HCC): ICD-10-CM

## 2023-07-14 LAB — NT-PROBNP SERPL-MCNC: 168 PG/ML (ref 0–449)

## 2023-07-14 PROCEDURE — 71046 X-RAY EXAM CHEST 2 VIEWS: CPT

## 2023-07-14 RX ORDER — FLUTICASONE PROPIONATE 50 MCG
2 SPRAY, SUSPENSION (ML) NASAL DAILY
Qty: 16 G | Refills: 5 | Status: SHIPPED | OUTPATIENT
Start: 2023-07-14

## 2023-07-14 NOTE — PATIENT INSTRUCTIONS
If sugar < 100 don't take any insulin    Write down your sugar levels AND how much insulin you take and send me list in 2 weeks.        Compression stockings 20-30 mm Hg

## 2023-07-17 DIAGNOSIS — J84.9 INTERSTITIAL LUNG DISEASE (HCC): Primary | ICD-10-CM

## 2023-07-26 ENCOUNTER — HOSPITAL ENCOUNTER (OUTPATIENT)
Age: 83
Discharge: HOME | End: 2023-07-26
Payer: MEDICARE

## 2023-07-26 DIAGNOSIS — N18.9 ACUTE KIDNEY INJURY SUPERIMPOSED ON CHRONIC KIDNEY DISEASE (HCC): ICD-10-CM

## 2023-07-26 DIAGNOSIS — E11.22 TYPE 2 DM WITH CKD STAGE 2 AND HYPERTENSION (HCC): ICD-10-CM

## 2023-07-26 DIAGNOSIS — N17.9 ACUTE KIDNEY INJURY SUPERIMPOSED ON CHRONIC KIDNEY DISEASE (HCC): ICD-10-CM

## 2023-07-26 DIAGNOSIS — N18.2 TYPE 2 DM WITH CKD STAGE 2 AND HYPERTENSION (HCC): ICD-10-CM

## 2023-07-26 DIAGNOSIS — R60.0 EDEMA LEG: ICD-10-CM

## 2023-07-26 DIAGNOSIS — I12.9 TYPE 2 DM WITH CKD STAGE 2 AND HYPERTENSION (HCC): ICD-10-CM

## 2023-07-26 DIAGNOSIS — J81.0 ACUTE PULMONARY EDEMA (HCC): ICD-10-CM

## 2023-07-26 DIAGNOSIS — I10 ESSENTIAL HYPERTENSION: ICD-10-CM

## 2023-07-26 DIAGNOSIS — I50.9 ACUTE CONGESTIVE HEART FAILURE, UNSPECIFIED HEART FAILURE TYPE (HCC): ICD-10-CM

## 2023-07-26 LAB
ALBUMIN SERPL-MCNC: 4 G/DL (ref 3.4–5)
ANION GAP SERPL CALCULATED.3IONS-SCNC: 14 MMOL/L (ref 3–16)
BUN SERPL-MCNC: 81 MG/DL (ref 7–20)
CALCIUM SERPL-MCNC: 9.5 MG/DL (ref 8.3–10.6)
CHLORIDE SERPL-SCNC: 97 MMOL/L (ref 99–110)
CO2 SERPL-SCNC: 26 MMOL/L (ref 21–32)
CREAT SERPL-MCNC: 2.2 MG/DL (ref 0.8–1.3)
CREAT UR-MCNC: 104.9 MG/DL (ref 39–259)
GFR SERPLBLD CREATININE-BSD FMLA CKD-EPI: 29 ML/MIN/{1.73_M2}
GLUCOSE SERPL-MCNC: 143 MG/DL (ref 70–99)
NT-PROBNP SERPL-MCNC: 151 PG/ML (ref 0–449)
PHOSPHATE SERPL-MCNC: 4 MG/DL (ref 2.5–4.9)
POTASSIUM SERPL-SCNC: 4.9 MMOL/L (ref 3.5–5.1)
PROT UR-MCNC: 14 MG/DL
PROT/CREAT UR-RTO: 0.1 MG/DL
SODIUM SERPL-SCNC: 137 MMOL/L (ref 136–145)

## 2023-07-26 PROCEDURE — 83880 ASSAY OF NATRIURETIC PEPTIDE: CPT

## 2023-07-26 PROCEDURE — 80069 RENAL FUNCTION PANEL: CPT

## 2023-07-26 PROCEDURE — 36415 COLL VENOUS BLD VENIPUNCTURE: CPT

## 2023-07-26 PROCEDURE — 82570 ASSAY OF URINE CREATININE: CPT

## 2023-07-26 PROCEDURE — 84156 ASSAY OF PROTEIN URINE: CPT

## 2023-07-27 ENCOUNTER — TELEPHONE (OUTPATIENT)
Dept: FAMILY MEDICINE CLINIC | Age: 83
End: 2023-07-27

## 2023-07-27 NOTE — TELEPHONE ENCOUNTER
Per Warren Salcido patient was seen for a re-evaluation for PT today. Patient does not want any further therapy. Per Warren Salcido patient O2 levels are dropping to around 88 when walking go back up to 93 in short amount of time, 96 at rest. This is no change from a month ago. This is the information for the monitoring Dr. Rigoberto Chacko had requested. FYI   patient has refused nursing visits. If you have any questions Warren Salcido can be reached at [phone]. Please advise.

## 2023-07-28 ENCOUNTER — TELEPHONE (OUTPATIENT)
Dept: FAMILY MEDICINE CLINIC | Age: 83
End: 2023-07-28

## 2023-07-28 DIAGNOSIS — E11.22 TYPE 2 DIABETES MELLITUS WITH STAGE 3B CHRONIC KIDNEY DISEASE, WITH LONG-TERM CURRENT USE OF INSULIN (HCC): ICD-10-CM

## 2023-07-28 DIAGNOSIS — Z79.4 TYPE 2 DIABETES MELLITUS WITH HYPERGLYCEMIA, WITH LONG-TERM CURRENT USE OF INSULIN (HCC): Primary | ICD-10-CM

## 2023-07-28 DIAGNOSIS — N18.32 TYPE 2 DIABETES MELLITUS WITH STAGE 3B CHRONIC KIDNEY DISEASE, WITH LONG-TERM CURRENT USE OF INSULIN (HCC): ICD-10-CM

## 2023-07-28 DIAGNOSIS — E11.65 TYPE 2 DIABETES MELLITUS WITH HYPERGLYCEMIA, WITH LONG-TERM CURRENT USE OF INSULIN (HCC): Primary | ICD-10-CM

## 2023-07-28 DIAGNOSIS — Z79.4 TYPE 2 DIABETES MELLITUS WITH STAGE 3B CHRONIC KIDNEY DISEASE, WITH LONG-TERM CURRENT USE OF INSULIN (HCC): ICD-10-CM

## 2023-07-28 NOTE — TELEPHONE ENCOUNTER
Per Angel Yousif patient was seen by Kidney Physician today and taken off of Metformin. She is needing to know how his insulin should be adjusted to compensate this change. She also is wanted to get information on a insulin pump for the patient. Please return her call to [phone]. Please advise.

## 2023-07-31 ENCOUNTER — OFFICE VISIT (OUTPATIENT)
Dept: PULMONOLOGY | Age: 83
End: 2023-07-31
Payer: MEDICARE

## 2023-07-31 VITALS
OXYGEN SATURATION: 90 % | HEIGHT: 66 IN | SYSTOLIC BLOOD PRESSURE: 110 MMHG | HEART RATE: 73 BPM | WEIGHT: 163.65 LBS | DIASTOLIC BLOOD PRESSURE: 58 MMHG | BODY MASS INDEX: 26.3 KG/M2

## 2023-07-31 DIAGNOSIS — J30.1 NON-SEASONAL ALLERGIC RHINITIS DUE TO POLLEN: ICD-10-CM

## 2023-07-31 DIAGNOSIS — J84.9 ILD (INTERSTITIAL LUNG DISEASE) (HCC): ICD-10-CM

## 2023-07-31 DIAGNOSIS — R05.3 CHRONIC COUGH: Primary | ICD-10-CM

## 2023-07-31 PROCEDURE — 3078F DIAST BP <80 MM HG: CPT | Performed by: INTERNAL MEDICINE

## 2023-07-31 PROCEDURE — G8427 DOCREV CUR MEDS BY ELIG CLIN: HCPCS | Performed by: INTERNAL MEDICINE

## 2023-07-31 PROCEDURE — 99204 OFFICE O/P NEW MOD 45 MIN: CPT | Performed by: INTERNAL MEDICINE

## 2023-07-31 PROCEDURE — 3074F SYST BP LT 130 MM HG: CPT | Performed by: INTERNAL MEDICINE

## 2023-07-31 PROCEDURE — G8417 CALC BMI ABV UP PARAM F/U: HCPCS | Performed by: INTERNAL MEDICINE

## 2023-07-31 PROCEDURE — 1036F TOBACCO NON-USER: CPT | Performed by: INTERNAL MEDICINE

## 2023-07-31 PROCEDURE — 1123F ACP DISCUSS/DSCN MKR DOCD: CPT | Performed by: INTERNAL MEDICINE

## 2023-07-31 NOTE — PROGRESS NOTES
- - 0.8   AST 15 - 37 U/L - - 16   ALT 10 - 40 U/L - - <5(L)   Some recent data might be hidden       All labs were personally reviewed by me any my interpretation is: CBC is anemia. Chem 8 is chronic kidney disease. IMAGING:    CT chest without contrast done on 6/23/2023 at [address]:     Chest   1. No acute intrathoracic traumatic abnormality. 2.  Smooth septal thickening in the bilateral apices, which can be seen with pulmonary edema. 3.  Subpleural and basilar predominant reticular opacities with irregular septal thickening, most likely related to fibrosis and/or interstitial lung disease. 4.  Few patchy groundglass opacities, likely infectious or inflammatory. 5.  Multiple enlarged mediastinal lymph nodes, likely reactive in the setting of diffuse pulmonary disease. Abdomen and Pelvis   1. No acute intra-abdominal or intrapelvic traumatic abnormality. 2.  Colonic diverticulosis without evidence of diverticulitis. 3.  2 mm nonobstructing calculus in the right kidney. Pulmonary Functions Testing Results:      Transthoracic echocardiogram done on 10/20/2018    Summary      Normal left ventricle size, wall thickness, and systolic function with an  estimated ejection fraction of 55-60%. No regional wall motion abnormalities  are seen. Normal diastolic function. Mild mitral regurgitation is present. Trivial tricuspid regurgitation. Signature    ------------------------------------------------------------------   Electronically signed by Ynes Soares MD, [facility]      IMPRESSION AND RECOMMENDATIONS:     1. Chronic cough  -Currently patient's respiratory symptoms have subsided.   Appears that cough that he was having in June may have been due to postnasal drip.  -I personally reviewed patient's CT chest which shows minimal bibasilar prominent interstitial markings.  -Comparing his imaging from 2018 these findings seem stable.  -At this time, I

## 2023-08-04 ENCOUNTER — HOSPITAL ENCOUNTER (OUTPATIENT)
Age: 83
Discharge: HOME | End: 2023-08-04
Payer: MEDICARE

## 2023-08-04 ENCOUNTER — HOSPITAL ENCOUNTER (OUTPATIENT)
Dept: PULMONOLOGY | Age: 83
Discharge: HOME | End: 2023-08-04
Payer: MEDICARE

## 2023-08-04 DIAGNOSIS — R05.3 CHRONIC COUGH: ICD-10-CM

## 2023-08-04 DIAGNOSIS — J84.9 ILD (INTERSTITIAL LUNG DISEASE) (HCC): ICD-10-CM

## 2023-08-04 LAB
DLCO %PRED: 51 %
DLCO PRED: NORMAL
DLCO/VA %PRED: NORMAL
DLCO/VA PRED: NORMAL
DLCO/VA: NORMAL
DLCO: NORMAL
EXPIRATORY TIME-POST: NORMAL
EXPIRATORY TIME: NORMAL
FEF 25-75% %CHNG: NORMAL
FEF 25-75% %PRED-POST: NORMAL
FEF 25-75% %PRED-PRE: NORMAL
FEF 25-75% PRED: NORMAL
FEF 25-75%-POST: NORMAL
FEF 25-75%-PRE: NORMAL
FEV1 %PRED-POST: NORMAL
FEV1 %PRED-PRE: 84 %
FEV1 PRED: NORMAL
FEV1-POST: NORMAL
FEV1-PRE: NORMAL
FEV1/FVC %PRED-POST: NORMAL
FEV1/FVC %PRED-PRE: NORMAL
FEV1/FVC PRED: NORMAL
FEV1/FVC-POST: NORMAL
FEV1/FVC-PRE: 82 %
FVC %PRED-POST: NORMAL
FVC %PRED-PRE: NORMAL
FVC PRED: NORMAL
FVC-POST: NORMAL
FVC-PRE: NORMAL
GAW %PRED: NORMAL
GAW PRED: NORMAL
GAW: NORMAL
IC %PRED: NORMAL
IC PRED: NORMAL
IC: NORMAL
MEP: NORMAL
MIP: NORMAL
MVV %PRED-PRE: NORMAL
MVV PRED: NORMAL
MVV-PRE: NORMAL
PEF %PRED-POST: NORMAL
PEF %PRED-PRE: NORMAL
PEF PRED: NORMAL
PEF%CHNG: NORMAL
PEF-POST: NORMAL
PEF-PRE: NORMAL
RAW %PRED: NORMAL
RAW PRED: NORMAL
RAW: NORMAL
RHEUMATOID FACT SER IA-ACNC: <10 IU/ML
RV %PRED: NORMAL
RV PRED: NORMAL
RV: NORMAL
SVC %PRED: NORMAL
SVC PRED: NORMAL
SVC: NORMAL
TLC %PRED: 98 %
TLC PRED: NORMAL
TLC: NORMAL
VA %PRED: NORMAL
VA PRED: NORMAL
VA: NORMAL
VTG %PRED: NORMAL
VTG PRED: NORMAL
VTG: NORMAL

## 2023-08-04 PROCEDURE — 94729 DIFFUSING CAPACITY: CPT

## 2023-08-04 PROCEDURE — 94060 EVALUATION OF WHEEZING: CPT

## 2023-08-04 PROCEDURE — 94726 PLETHYSMOGRAPHY LUNG VOLUMES: CPT

## 2023-08-04 PROCEDURE — 94760 N-INVAS EAR/PLS OXIMETRY 1: CPT

## 2023-08-04 ASSESSMENT — PULMONARY FUNCTION TESTS
FEV1_PERCENT_PREDICTED_PRE: 84
FEV1/FVC_PRE: 82

## 2023-08-05 LAB
ANA SER QL IA: POSITIVE
CCP IGG SERPL-ACNC: 0.6 U/ML (ref 0–2.9)
CENTROMERE B IGG SER QL LINE BLOT: 4.9 AI (ref 0–0.9)
CHROMATIN AB SERPL-ACNC: <0.2 AI (ref 0–0.9)
DSDNA AB SER-ACNC: 3 IU/ML (ref 0–9)
ENA JO1 AB SER IA-ACNC: <0.2 AI (ref 0–0.9)
ENA RNP AB SER IA-ACNC: 0.3 AI (ref 0–0.9)
ENA SCL70 IGG SER IA-ACNC: <0.2 AI (ref 0–0.9)
ENA SM AB SER IA-ACNC: <0.2 AI (ref 0–0.9)
ENA SM+RNP IGG SER-ACNC: <0.2 AI (ref 0–0.9)
ENA SS-A AB SER IA-ACNC: 0.2 AI (ref 0–0.9)
ENA SS-B AB SER IA-ACNC: <0.2 AI (ref 0–0.9)
RIBOSOMAL P AB SER IA-ACNC: <0.2 AI (ref 0–0.9)

## 2023-08-07 ENCOUNTER — OFFICE VISIT (OUTPATIENT)
Dept: NEUROLOGY | Age: 83
End: 2023-08-07
Payer: MEDICARE

## 2023-08-07 VITALS
BODY MASS INDEX: 26.2 KG/M2 | HEART RATE: 73 BPM | HEIGHT: 66 IN | SYSTOLIC BLOOD PRESSURE: 131 MMHG | DIASTOLIC BLOOD PRESSURE: 68 MMHG | WEIGHT: 163 LBS

## 2023-08-07 DIAGNOSIS — Z91.81 HISTORY OF RECENT FALL: ICD-10-CM

## 2023-08-07 DIAGNOSIS — E11.42 DIABETIC PERIPHERAL NEUROPATHY (HCC): ICD-10-CM

## 2023-08-07 DIAGNOSIS — G20 PARKINSON'S DISEASE (HCC): Primary | ICD-10-CM

## 2023-08-07 DIAGNOSIS — R27.0 ATAXIA: ICD-10-CM

## 2023-08-07 LAB — MISCELLANEOUS LAB TEST ORDER: NORMAL

## 2023-08-07 PROCEDURE — 99214 OFFICE O/P EST MOD 30 MIN: CPT | Performed by: PSYCHIATRY & NEUROLOGY

## 2023-08-07 PROCEDURE — G8427 DOCREV CUR MEDS BY ELIG CLIN: HCPCS | Performed by: PSYCHIATRY & NEUROLOGY

## 2023-08-07 PROCEDURE — G8417 CALC BMI ABV UP PARAM F/U: HCPCS | Performed by: PSYCHIATRY & NEUROLOGY

## 2023-08-07 PROCEDURE — 3051F HG A1C>EQUAL 7.0%<8.0%: CPT | Performed by: PSYCHIATRY & NEUROLOGY

## 2023-08-07 PROCEDURE — 1036F TOBACCO NON-USER: CPT | Performed by: PSYCHIATRY & NEUROLOGY

## 2023-08-07 PROCEDURE — 3078F DIAST BP <80 MM HG: CPT | Performed by: PSYCHIATRY & NEUROLOGY

## 2023-08-07 PROCEDURE — 1123F ACP DISCUSS/DSCN MKR DOCD: CPT | Performed by: PSYCHIATRY & NEUROLOGY

## 2023-08-07 PROCEDURE — 3075F SYST BP GE 130 - 139MM HG: CPT | Performed by: PSYCHIATRY & NEUROLOGY

## 2023-08-07 NOTE — PROGRESS NOTES
Beltran Mobley   Neurology followup    Subjective:   CC/HP  History was obtained from the patient. Patient has known Parkinson's disease. Patient states that his symptoms are unchanged. He still has some tremors. He has some balance difficulties. .  Patient has been under a lot of stress. Patient's wife passed away after she had open heart surgery for heart valve replacement. Patient has been under some stress. Patient has not been eating well and has lost some weight as well. Patient has mild stiffness. .  No side effects to medication. Diabetes is reasonably controlled. His last hemoglobin A1c is 7.6. Patient had a recent fall when he was not using a cane now. He fell and had several stitches put in. He was at [facility].  Detailed history:  Patient complaints of tremors in both his arms. It initially started about a year ago and seems to be slowly getting worse. Onset was gradual.  Symptoms are persistent. No clear aggravating or relieving factors to symptoms. Patient denies any stiffness. He denies any difficulty with his gait   Patient has known diabetes which is well controlled  Patient denies any difficulty getting in and out of a car or rolling over in bed.   Patient complains of some generalized weakness in his legs      REVIEW OF SYSTEMS    Constitutional:  []   Chills   []  Fatigue   []  Fevers   []  Malaise   []  Weight loss     [x] Denies all of the above    Respiratory:   []  Cough    []  Shortness of breath         [x] Denies all of the above     Cardiovascular:   []  Chest pain    []  Exertional chest pressure/discomfort           [] Palpitations    []  Syncope     [x] Denies all of the above        Past Medical History:   Diagnosis Date    Arthropathy, unspecified, site unspecified     Hypertension     Hypertrophy of prostate without urinary obstruction and other lower urinary tract symptoms (LUTS)     Impotence of organic origin     Inguinal hernia     Myalgia and

## 2023-08-08 NOTE — PROGRESS NOTES
Jessica Daniel) and sees her 9/1/23. He'll see nephrology in September. His dermatologist is Dr Timmy Cutler from [facility]. He has help from his 2 daughters who live close and he lives with his dog. Pt denies exertional chest pain, HOLLEY/PND, palpitations, light-headedness, edema. He is walking with a cane. Past Medical History:   has a past medical history of Arthropathy, unspecified, site unspecified, Hypertension, Hypertrophy of prostate without urinary obstruction and other lower urinary tract symptoms (LUTS), Impotence of organic origin, Inguinal hernia, Myalgia and myositis, unspecified, Other and unspecified hyperlipidemia, Retinal microaneurysms NOS, and Type II or unspecified type diabetes mellitus without mention of complication, not stated as uncontrolled. Surgical History:   has a past surgical history that includes Lumbar disc surgery (1998); Cataract removal (Bilateral); Inguinal hernia repair (Left); Coronary angioplasty with stent (12/26/2013); Skin cancer excision; and shoulder surgery (Left, January 16th 2019). Social History:   reports that he has never smoked. He has been exposed to tobacco smoke. He has never used smokeless tobacco. He reports current alcohol use. He reports that he does not use drugs. Family History:  family history includes Arthritis in his father; No Known Problems in his daughter, daughter, and daughter. Home Medications:  Prior to Admission medications    Medication Sig Start Date End Date Taking?  Authorizing Provider   furosemide (LASIX) 40 MG tablet Take 1 tablet by mouth daily 8/11/23  Yes Aris Holguin MD   carbidopa-levodopa (SINEMET)  MG per tablet TAKE 1 AND 1/2 TABLET BY MOUTH THREE TIMES A DAY 8/7/23  Yes Stevan Crowder MD   fluticasone (FLONASE) 50 MCG/ACT nasal spray 2 sprays by Each Nostril route daily 7/14/23  Yes Gabino Aaron MD   hydrALAZINE (APRESOLINE) 25 MG tablet Take 1 tablet by mouth in the morning and at bedtime 7/10/23

## 2023-08-09 ENCOUNTER — HOSPITAL ENCOUNTER (OUTPATIENT)
Age: 83
Discharge: HOME | End: 2023-08-09
Payer: MEDICARE

## 2023-08-09 DIAGNOSIS — N18.2 TYPE 2 DM WITH CKD STAGE 2 AND HYPERTENSION (HCC): ICD-10-CM

## 2023-08-09 DIAGNOSIS — E11.22 TYPE 2 DM WITH CKD STAGE 2 AND HYPERTENSION (HCC): ICD-10-CM

## 2023-08-09 DIAGNOSIS — N17.9 ACUTE KIDNEY INJURY SUPERIMPOSED ON CHRONIC KIDNEY DISEASE (HCC): ICD-10-CM

## 2023-08-09 DIAGNOSIS — R60.0 EDEMA LEG: ICD-10-CM

## 2023-08-09 DIAGNOSIS — I12.9 TYPE 2 DM WITH CKD STAGE 2 AND HYPERTENSION (HCC): ICD-10-CM

## 2023-08-09 DIAGNOSIS — N18.9 ACUTE KIDNEY INJURY SUPERIMPOSED ON CHRONIC KIDNEY DISEASE (HCC): ICD-10-CM

## 2023-08-09 LAB
25(OH)D3 SERPL-MCNC: 47.9 NG/ML
ALBUMIN SERPL-MCNC: 4 G/DL (ref 3.4–5)
ANION GAP SERPL CALCULATED.3IONS-SCNC: 8 MMOL/L (ref 3–16)
ANTINUCLEAR AB INTERPRETIVE COMMENT: NORMAL
BACTERIA URNS QL MICRO: NORMAL /HPF
BILIRUB UR QL STRIP.AUTO: NEGATIVE
BUN SERPL-MCNC: 44 MG/DL (ref 7–20)
CALCIUM SERPL-MCNC: 9.6 MG/DL (ref 8.3–10.6)
CHLORIDE SERPL-SCNC: 102 MMOL/L (ref 99–110)
CLARITY UR: CLEAR
CO2 SERPL-SCNC: 28 MMOL/L (ref 21–32)
COLOR UR: YELLOW
CREAT SERPL-MCNC: 1.5 MG/DL (ref 0.8–1.3)
CREAT UR-MCNC: 94.3 MG/DL (ref 39–259)
DEPRECATED RDW RBC AUTO: 16.3 % (ref 12.4–15.4)
EPI CELLS #/AREA URNS AUTO: 1 /HPF (ref 0–5)
GFR SERPLBLD CREATININE-BSD FMLA CKD-EPI: 46 ML/MIN/{1.73_M2}
GLUCOSE SERPL-MCNC: 217 MG/DL (ref 70–99)
GLUCOSE UR STRIP.AUTO-MCNC: 100 MG/DL
HCT VFR BLD AUTO: 37.8 % (ref 40.5–52.5)
HGB BLD-MCNC: 12.6 G/DL (ref 13.5–17.5)
HGB UR QL STRIP.AUTO: NEGATIVE
HYALINE CASTS #/AREA URNS AUTO: 0 /LPF (ref 0–8)
KETONES UR STRIP.AUTO-MCNC: NEGATIVE MG/DL
LEUKOCYTE ESTERASE UR QL STRIP.AUTO: NEGATIVE
MCH RBC QN AUTO: 29.2 PG (ref 26–34)
MCHC RBC AUTO-ENTMCNC: 33.4 G/DL (ref 31–36)
MCV RBC AUTO: 87.4 FL (ref 80–100)
NITRITE UR QL STRIP.AUTO: NEGATIVE
NUCLEAR IGG SER QL IF: NORMAL
PH UR STRIP.AUTO: 5.5 [PH] (ref 5–8)
PHOSPHATE SERPL-MCNC: 3.6 MG/DL (ref 2.5–4.9)
PLATELET # BLD AUTO: 298 K/UL (ref 135–450)
PMV BLD AUTO: 8 FL (ref 5–10.5)
POTASSIUM SERPL-SCNC: 5.4 MMOL/L (ref 3.5–5.1)
PROT UR STRIP.AUTO-MCNC: ABNORMAL MG/DL
PROT UR-MCNC: 18 MG/DL
PROT/CREAT UR-RTO: 0.2 MG/DL
PTH-INTACT SERPL-MCNC: 35.4 PG/ML (ref 14–72)
RBC # BLD AUTO: 4.32 M/UL (ref 4.2–5.9)
RBC CLUMPS #/AREA URNS AUTO: 0 /HPF (ref 0–4)
SODIUM SERPL-SCNC: 138 MMOL/L (ref 136–145)
SP GR UR STRIP.AUTO: 1.02 (ref 1–1.03)
UA COMPLETE W REFLEX CULTURE PNL UR: ABNORMAL
UA DIPSTICK W REFLEX MICRO PNL UR: YES
URN SPEC COLLECT METH UR: ABNORMAL
UROBILINOGEN UR STRIP-ACNC: 0.2 E.U./DL
WBC # BLD AUTO: 8.7 K/UL (ref 4–11)
WBC #/AREA URNS AUTO: 1 /HPF (ref 0–5)

## 2023-08-09 PROCEDURE — 80069 RENAL FUNCTION PANEL: CPT

## 2023-08-09 PROCEDURE — 84156 ASSAY OF PROTEIN URINE: CPT

## 2023-08-09 PROCEDURE — 82570 ASSAY OF URINE CREATININE: CPT

## 2023-08-09 PROCEDURE — 85027 COMPLETE CBC AUTOMATED: CPT

## 2023-08-09 PROCEDURE — 82306 VITAMIN D 25 HYDROXY: CPT

## 2023-08-09 PROCEDURE — 36415 COLL VENOUS BLD VENIPUNCTURE: CPT

## 2023-08-09 PROCEDURE — 81001 URINALYSIS AUTO W/SCOPE: CPT

## 2023-08-09 PROCEDURE — 83970 ASSAY OF PARATHORMONE: CPT

## 2023-08-23 ENCOUNTER — OFFICE VISIT (OUTPATIENT)
Dept: CARDIOLOGY CLINIC | Age: 83
End: 2023-08-23
Payer: MEDICARE

## 2023-08-23 VITALS
OXYGEN SATURATION: 97 % | SYSTOLIC BLOOD PRESSURE: 98 MMHG | HEIGHT: 66 IN | WEIGHT: 166 LBS | BODY MASS INDEX: 26.68 KG/M2 | DIASTOLIC BLOOD PRESSURE: 46 MMHG | HEART RATE: 63 BPM

## 2023-08-23 DIAGNOSIS — I25.10 CORONARY ARTERY DISEASE INVOLVING NATIVE CORONARY ARTERY OF NATIVE HEART WITHOUT ANGINA PECTORIS: Primary | ICD-10-CM

## 2023-08-23 DIAGNOSIS — E78.49 OTHER HYPERLIPIDEMIA: ICD-10-CM

## 2023-08-23 DIAGNOSIS — I10 PRIMARY HYPERTENSION: ICD-10-CM

## 2023-08-23 DIAGNOSIS — E11.9 TYPE 2 DIABETES MELLITUS WITHOUT COMPLICATION, WITHOUT LONG-TERM CURRENT USE OF INSULIN (HCC): ICD-10-CM

## 2023-08-23 DIAGNOSIS — E11.42 DIABETIC PERIPHERAL NEUROPATHY (HCC): ICD-10-CM

## 2023-08-23 PROCEDURE — 3078F DIAST BP <80 MM HG: CPT | Performed by: INTERNAL MEDICINE

## 2023-08-23 PROCEDURE — 1123F ACP DISCUSS/DSCN MKR DOCD: CPT | Performed by: INTERNAL MEDICINE

## 2023-08-23 PROCEDURE — 1036F TOBACCO NON-USER: CPT | Performed by: INTERNAL MEDICINE

## 2023-08-23 PROCEDURE — G8417 CALC BMI ABV UP PARAM F/U: HCPCS | Performed by: INTERNAL MEDICINE

## 2023-08-23 PROCEDURE — 99214 OFFICE O/P EST MOD 30 MIN: CPT | Performed by: INTERNAL MEDICINE

## 2023-08-23 PROCEDURE — 3051F HG A1C>EQUAL 7.0%<8.0%: CPT | Performed by: INTERNAL MEDICINE

## 2023-08-23 PROCEDURE — G8427 DOCREV CUR MEDS BY ELIG CLIN: HCPCS | Performed by: INTERNAL MEDICINE

## 2023-08-23 PROCEDURE — 3074F SYST BP LT 130 MM HG: CPT | Performed by: INTERNAL MEDICINE

## 2023-08-31 RX ORDER — CELECOXIB 100 MG/1
CAPSULE ORAL
Qty: 90 CAPSULE | Refills: 0 | Status: SHIPPED
Start: 2023-08-31 | End: 2023-09-01 | Stop reason: ALTCHOICE

## 2023-09-01 ENCOUNTER — OFFICE VISIT (OUTPATIENT)
Dept: FAMILY MEDICINE CLINIC | Age: 83
End: 2023-09-01

## 2023-09-01 VITALS
DIASTOLIC BLOOD PRESSURE: 42 MMHG | TEMPERATURE: 97.5 F | BODY MASS INDEX: 26.66 KG/M2 | WEIGHT: 165.2 LBS | OXYGEN SATURATION: 96 % | HEART RATE: 38 BPM | SYSTOLIC BLOOD PRESSURE: 100 MMHG

## 2023-09-01 DIAGNOSIS — Z79.4 TYPE 2 DIABETES MELLITUS WITH HYPERGLYCEMIA, WITH LONG-TERM CURRENT USE OF INSULIN (HCC): Primary | ICD-10-CM

## 2023-09-01 DIAGNOSIS — E11.65 TYPE 2 DIABETES MELLITUS WITH HYPERGLYCEMIA, WITH LONG-TERM CURRENT USE OF INSULIN (HCC): Primary | ICD-10-CM

## 2023-09-01 DIAGNOSIS — I10 PRIMARY HYPERTENSION: ICD-10-CM

## 2023-09-01 DIAGNOSIS — E11.22 TYPE 2 DIABETES MELLITUS WITH STAGE 3B CHRONIC KIDNEY DISEASE, WITH LONG-TERM CURRENT USE OF INSULIN (HCC): ICD-10-CM

## 2023-09-01 DIAGNOSIS — Z79.4 TYPE 2 DIABETES MELLITUS WITH STAGE 3B CHRONIC KIDNEY DISEASE, WITH LONG-TERM CURRENT USE OF INSULIN (HCC): ICD-10-CM

## 2023-09-01 DIAGNOSIS — R60.9 PERIPHERAL EDEMA: ICD-10-CM

## 2023-09-01 DIAGNOSIS — J84.9 INTERSTITIAL LUNG DISEASE (HCC): ICD-10-CM

## 2023-09-01 DIAGNOSIS — N18.32 TYPE 2 DIABETES MELLITUS WITH STAGE 3B CHRONIC KIDNEY DISEASE, WITH LONG-TERM CURRENT USE OF INSULIN (HCC): ICD-10-CM

## 2023-09-01 LAB — HBA1C MFR BLD: 8.8 %

## 2023-09-01 RX ORDER — HYDRALAZINE HYDROCHLORIDE 25 MG/1
12.5 TABLET, FILM COATED ORAL 2 TIMES DAILY
Qty: 90 TABLET | Refills: 0 | Status: SHIPPED
Start: 2023-09-01

## 2023-09-01 NOTE — PATIENT INSTRUCTIONS
-Cut the hydralazine in half - 12.5mg twice daily.    -check your BP daily and send me readings in 2 weeks    -write down how much insulin you are taking along with your sugar numbers and take those to when you see the endocrinologis    -have the lab do Dr. La Donate blood work that was ordered in June the next time you go.

## 2023-09-15 ENCOUNTER — TELEPHONE (OUTPATIENT)
Dept: FAMILY MEDICINE CLINIC | Age: 83
End: 2023-09-15

## 2023-09-20 ENCOUNTER — HOSPITAL ENCOUNTER (OUTPATIENT)
Age: 83
Discharge: HOME | End: 2023-09-20
Payer: MEDICARE

## 2023-09-20 ENCOUNTER — APPOINTMENT (OUTPATIENT)
Dept: GENERAL RADIOLOGY | Age: 83
End: 2023-09-20
Payer: MEDICARE

## 2023-09-20 ENCOUNTER — TELEPHONE (OUTPATIENT)
Dept: FAMILY MEDICINE CLINIC | Age: 83
End: 2023-09-20

## 2023-09-20 ENCOUNTER — HOSPITAL ENCOUNTER (INPATIENT)
Age: 83
LOS: 2 days | Discharge: HOME | End: 2023-09-22
Attending: EMERGENCY MEDICINE | Admitting: STUDENT IN AN ORGANIZED HEALTH CARE EDUCATION/TRAINING PROGRAM
Payer: MEDICARE

## 2023-09-20 DIAGNOSIS — Z79.4 TYPE 2 DIABETES MELLITUS WITH HYPERGLYCEMIA, WITH LONG-TERM CURRENT USE OF INSULIN (HCC): ICD-10-CM

## 2023-09-20 DIAGNOSIS — E03.9 HYPOTHYROIDISM, UNSPECIFIED TYPE: ICD-10-CM

## 2023-09-20 DIAGNOSIS — E11.22 TYPE 2 DIABETES MELLITUS WITH STAGE 3A CHRONIC KIDNEY DISEASE, WITHOUT LONG-TERM CURRENT USE OF INSULIN (HCC): ICD-10-CM

## 2023-09-20 DIAGNOSIS — N17.9 ACUTE RENAL FAILURE, UNSPECIFIED ACUTE RENAL FAILURE TYPE (HCC): Primary | ICD-10-CM

## 2023-09-20 DIAGNOSIS — R79.9 ELEVATED BUN: ICD-10-CM

## 2023-09-20 DIAGNOSIS — E11.65 TYPE 2 DIABETES MELLITUS WITH HYPERGLYCEMIA, WITH LONG-TERM CURRENT USE OF INSULIN (HCC): ICD-10-CM

## 2023-09-20 DIAGNOSIS — N18.31 TYPE 2 DIABETES MELLITUS WITH STAGE 3A CHRONIC KIDNEY DISEASE, WITHOUT LONG-TERM CURRENT USE OF INSULIN (HCC): ICD-10-CM

## 2023-09-20 LAB
25(OH)D3 SERPL-MCNC: 45.9 NG/ML
ALBUMIN SERPL-MCNC: 3.9 G/DL (ref 3.4–5)
ALBUMIN SERPL-MCNC: 4.2 G/DL (ref 3.4–5)
ALBUMIN/GLOB SERPL: 0.9 {RATIO} (ref 1.1–2.2)
ALBUMIN/GLOB SERPL: 1 {RATIO} (ref 1.1–2.2)
ALP SERPL-CCNC: 69 U/L (ref 40–129)
ALP SERPL-CCNC: 89 U/L (ref 40–129)
ALT SERPL-CCNC: 11 U/L (ref 10–40)
ALT SERPL-CCNC: 8 U/L (ref 10–40)
ANION GAP SERPL CALCULATED.3IONS-SCNC: 14 MMOL/L (ref 3–16)
ANION GAP SERPL CALCULATED.3IONS-SCNC: 18 MMOL/L (ref 3–16)
AST SERPL-CCNC: 18 U/L (ref 15–37)
AST SERPL-CCNC: 21 U/L (ref 15–37)
BACTERIA URNS QL MICRO: ABNORMAL /HPF
BASOPHILS # BLD: 0.1 K/UL (ref 0–0.2)
BASOPHILS NFR BLD: 0.8 %
BILIRUB SERPL-MCNC: 0.9 MG/DL (ref 0–1)
BILIRUB SERPL-MCNC: 1 MG/DL (ref 0–1)
BILIRUB UR QL STRIP.AUTO: NEGATIVE
BILIRUB UR QL STRIP.AUTO: NEGATIVE
BUN SERPL-MCNC: 103 MG/DL (ref 7–20)
BUN SERPL-MCNC: 108 MG/DL (ref 7–20)
CALCIUM SERPL-MCNC: 8.7 MG/DL (ref 8.3–10.6)
CALCIUM SERPL-MCNC: 9.5 MG/DL (ref 8.3–10.6)
CHLORIDE SERPL-SCNC: 95 MMOL/L (ref 99–110)
CHLORIDE SERPL-SCNC: 98 MMOL/L (ref 99–110)
CHOLEST SERPL-MCNC: 92 MG/DL (ref 0–199)
CLARITY UR: CLEAR
CLARITY UR: CLEAR
CO2 SERPL-SCNC: 19 MMOL/L (ref 21–32)
CO2 SERPL-SCNC: 26 MMOL/L (ref 21–32)
COLOR UR: YELLOW
COLOR UR: YELLOW
CREAT SERPL-MCNC: 2.5 MG/DL (ref 0.8–1.3)
CREAT SERPL-MCNC: 2.5 MG/DL (ref 0.8–1.3)
CREAT UR-MCNC: 105.6 MG/DL (ref 39–259)
CREAT UR-MCNC: 106.2 MG/DL (ref 39–259)
DEPRECATED RDW RBC AUTO: 15.9 % (ref 12.4–15.4)
DEPRECATED RDW RBC AUTO: 16.2 % (ref 12.4–15.4)
EOSINOPHIL # BLD: 0.2 K/UL (ref 0–0.6)
EOSINOPHIL NFR BLD: 2.2 %
EPI CELLS #/AREA URNS AUTO: 1 /HPF (ref 0–5)
GFR SERPLBLD CREATININE-BSD FMLA CKD-EPI: 25 ML/MIN/{1.73_M2}
GFR SERPLBLD CREATININE-BSD FMLA CKD-EPI: 25 ML/MIN/{1.73_M2}
GLUCOSE SERPL-MCNC: 142 MG/DL (ref 70–99)
GLUCOSE SERPL-MCNC: 473 MG/DL (ref 70–99)
GLUCOSE UR STRIP.AUTO-MCNC: 500 MG/DL
GLUCOSE UR STRIP.AUTO-MCNC: >=1000 MG/DL
HCT VFR BLD AUTO: 36.6 % (ref 40.5–52.5)
HCT VFR BLD AUTO: 36.7 % (ref 40.5–52.5)
HDLC SERPL-MCNC: 55 MG/DL (ref 40–60)
HGB BLD-MCNC: 12.2 G/DL (ref 13.5–17.5)
HGB BLD-MCNC: 12.3 G/DL (ref 13.5–17.5)
HGB UR QL STRIP.AUTO: NEGATIVE
HGB UR QL STRIP.AUTO: NEGATIVE
HYALINE CASTS #/AREA URNS AUTO: 1 /LPF (ref 0–8)
KETONES UR STRIP.AUTO-MCNC: NEGATIVE MG/DL
KETONES UR STRIP.AUTO-MCNC: NEGATIVE MG/DL
LDLC SERPL CALC-MCNC: 28 MG/DL
LEUKOCYTE ESTERASE UR QL STRIP.AUTO: NEGATIVE
LEUKOCYTE ESTERASE UR QL STRIP.AUTO: NEGATIVE
LYMPHOCYTES # BLD: 0.9 K/UL (ref 1–5.1)
LYMPHOCYTES NFR BLD: 10.3 %
MCH RBC QN AUTO: 29.3 PG (ref 26–34)
MCH RBC QN AUTO: 29.5 PG (ref 26–34)
MCHC RBC AUTO-ENTMCNC: 33.2 G/DL (ref 31–36)
MCHC RBC AUTO-ENTMCNC: 33.7 G/DL (ref 31–36)
MCV RBC AUTO: 87.4 FL (ref 80–100)
MCV RBC AUTO: 88 FL (ref 80–100)
MICROALBUMIN UR DL<=1MG/L-MCNC: 2.6 MG/DL
MICROALBUMIN/CREAT UR: 24.5 MG/G (ref 0–30)
MONOCYTES # BLD: 0.9 K/UL (ref 0–1.3)
MONOCYTES NFR BLD: 10.1 %
NEUTROPHILS # BLD: 6.6 K/UL (ref 1.7–7.7)
NEUTROPHILS NFR BLD: 76.6 %
NITRITE UR QL STRIP.AUTO: NEGATIVE
NITRITE UR QL STRIP.AUTO: NEGATIVE
NT-PROBNP SERPL-MCNC: 156 PG/ML (ref 0–449)
PH UR STRIP.AUTO: 5 [PH] (ref 5–8)
PH UR STRIP.AUTO: 5.5 [PH] (ref 5–8)
PHOSPHATE SERPL-MCNC: 5.5 MG/DL (ref 2.5–4.9)
PLATELET # BLD AUTO: 314 K/UL (ref 135–450)
PLATELET # BLD AUTO: 342 K/UL (ref 135–450)
PMV BLD AUTO: 7.8 FL (ref 5–10.5)
PMV BLD AUTO: 7.9 FL (ref 5–10.5)
POTASSIUM SERPL-SCNC: 4.3 MMOL/L (ref 3.5–5.1)
POTASSIUM SERPL-SCNC: 4.9 MMOL/L (ref 3.5–5.1)
PROT SERPL-MCNC: 8.1 G/DL (ref 6.4–8.2)
PROT SERPL-MCNC: 8.5 G/DL (ref 6.4–8.2)
PROT UR STRIP.AUTO-MCNC: ABNORMAL MG/DL
PROT UR STRIP.AUTO-MCNC: NEGATIVE MG/DL
PROT UR-MCNC: 15 MG/DL
PROT/CREAT UR-RTO: 0.1 MG/DL
PTH-INTACT SERPL-MCNC: 73.5 PG/ML (ref 14–72)
RBC # BLD AUTO: 4.17 M/UL (ref 4.2–5.9)
RBC # BLD AUTO: 4.19 M/UL (ref 4.2–5.9)
RBC CLUMPS #/AREA URNS AUTO: 1 /HPF (ref 0–4)
SODIUM SERPL-SCNC: 135 MMOL/L (ref 136–145)
SODIUM SERPL-SCNC: 135 MMOL/L (ref 136–145)
SP GR UR STRIP.AUTO: 1.02 (ref 1–1.03)
SP GR UR STRIP.AUTO: 1.02 (ref 1–1.03)
T4 FREE SERPL-MCNC: 1.2 NG/DL (ref 0.9–1.8)
TRIGL SERPL-MCNC: 44 MG/DL (ref 0–150)
TROPONIN, HIGH SENSITIVITY: 46 NG/L (ref 0–22)
TSH SERPL DL<=0.005 MIU/L-ACNC: 8.63 UIU/ML (ref 0.27–4.2)
UA COMPLETE W REFLEX CULTURE PNL UR: ABNORMAL
UA DIPSTICK W REFLEX MICRO PNL UR: ABNORMAL
UA DIPSTICK W REFLEX MICRO PNL UR: YES
URN SPEC COLLECT METH UR: ABNORMAL
URN SPEC COLLECT METH UR: ABNORMAL
UROBILINOGEN UR STRIP-ACNC: 0.2 E.U./DL
UROBILINOGEN UR STRIP-ACNC: 0.2 E.U./DL
VLDLC SERPL CALC-MCNC: 9 MG/DL
WBC # BLD AUTO: 8.6 K/UL (ref 4–11)
WBC # BLD AUTO: 9.8 K/UL (ref 4–11)
WBC #/AREA URNS AUTO: 1 /HPF (ref 0–5)

## 2023-09-20 PROCEDURE — 71045 X-RAY EXAM CHEST 1 VIEW: CPT

## 2023-09-20 PROCEDURE — 84484 ASSAY OF TROPONIN QUANT: CPT

## 2023-09-20 PROCEDURE — 82043 UR ALBUMIN QUANTITATIVE: CPT

## 2023-09-20 PROCEDURE — 83880 ASSAY OF NATRIURETIC PEPTIDE: CPT

## 2023-09-20 PROCEDURE — 36415 COLL VENOUS BLD VENIPUNCTURE: CPT

## 2023-09-20 PROCEDURE — 84156 ASSAY OF PROTEIN URINE: CPT

## 2023-09-20 PROCEDURE — 85025 COMPLETE CBC W/AUTO DIFF WBC: CPT

## 2023-09-20 PROCEDURE — 84443 ASSAY THYROID STIM HORMONE: CPT

## 2023-09-20 PROCEDURE — 83970 ASSAY OF PARATHORMONE: CPT

## 2023-09-20 PROCEDURE — 84100 ASSAY OF PHOSPHORUS: CPT

## 2023-09-20 PROCEDURE — 81001 URINALYSIS AUTO W/SCOPE: CPT

## 2023-09-20 PROCEDURE — 80061 LIPID PANEL: CPT

## 2023-09-20 PROCEDURE — 82570 ASSAY OF URINE CREATININE: CPT

## 2023-09-20 PROCEDURE — 85027 COMPLETE CBC AUTOMATED: CPT

## 2023-09-20 PROCEDURE — 82306 VITAMIN D 25 HYDROXY: CPT

## 2023-09-20 PROCEDURE — 93005 ELECTROCARDIOGRAM TRACING: CPT | Performed by: NURSE PRACTITIONER

## 2023-09-20 PROCEDURE — 2580000003 HC RX 258: Performed by: STUDENT IN AN ORGANIZED HEALTH CARE EDUCATION/TRAINING PROGRAM

## 2023-09-20 PROCEDURE — 1200000000 HC SEMI PRIVATE

## 2023-09-20 PROCEDURE — 81003 URINALYSIS AUTO W/O SCOPE: CPT

## 2023-09-20 PROCEDURE — 99285 EMERGENCY DEPT VISIT HI MDM: CPT

## 2023-09-20 PROCEDURE — 84439 ASSAY OF FREE THYROXINE: CPT

## 2023-09-20 PROCEDURE — 83036 HEMOGLOBIN GLYCOSYLATED A1C: CPT

## 2023-09-20 PROCEDURE — 6370000000 HC RX 637 (ALT 250 FOR IP): Performed by: NURSE PRACTITIONER

## 2023-09-20 PROCEDURE — 80053 COMPREHEN METABOLIC PANEL: CPT

## 2023-09-20 RX ORDER — INSULIN GLARGINE 100 [IU]/ML
15 INJECTION, SOLUTION SUBCUTANEOUS NIGHTLY
Status: DISCONTINUED | OUTPATIENT
Start: 2023-09-20 | End: 2023-09-22 | Stop reason: HOSPADM

## 2023-09-20 RX ORDER — HEPARIN SODIUM 5000 [USP'U]/ML
5000 INJECTION, SOLUTION INTRAVENOUS; SUBCUTANEOUS EVERY 8 HOURS SCHEDULED
Status: DISCONTINUED | OUTPATIENT
Start: 2023-09-21 | End: 2023-09-22 | Stop reason: HOSPADM

## 2023-09-20 RX ORDER — POLYETHYLENE GLYCOL 3350 17 G/17G
17 POWDER, FOR SOLUTION ORAL DAILY PRN
Status: DISCONTINUED | OUTPATIENT
Start: 2023-09-20 | End: 2023-09-22 | Stop reason: HOSPADM

## 2023-09-20 RX ORDER — LEVOTHYROXINE SODIUM 0.03 MG/1
25 TABLET ORAL DAILY
Status: DISCONTINUED | OUTPATIENT
Start: 2023-09-21 | End: 2023-09-22 | Stop reason: HOSPADM

## 2023-09-20 RX ORDER — ONDANSETRON 2 MG/ML
4 INJECTION INTRAMUSCULAR; INTRAVENOUS EVERY 6 HOURS PRN
Status: DISCONTINUED | OUTPATIENT
Start: 2023-09-20 | End: 2023-09-22 | Stop reason: HOSPADM

## 2023-09-20 RX ORDER — SODIUM CHLORIDE 450 MG/100ML
INJECTION, SOLUTION INTRAVENOUS CONTINUOUS
Status: DISCONTINUED | OUTPATIENT
Start: 2023-09-20 | End: 2023-09-20 | Stop reason: HOSPADM

## 2023-09-20 RX ORDER — ASPIRIN 81 MG/1
81 TABLET ORAL DAILY
Status: DISCONTINUED | OUTPATIENT
Start: 2023-09-21 | End: 2023-09-22 | Stop reason: HOSPADM

## 2023-09-20 RX ORDER — ACETAMINOPHEN 650 MG/1
650 SUPPOSITORY RECTAL EVERY 6 HOURS PRN
Status: DISCONTINUED | OUTPATIENT
Start: 2023-09-20 | End: 2023-09-22 | Stop reason: HOSPADM

## 2023-09-20 RX ORDER — ONDANSETRON 4 MG/1
4 TABLET, ORALLY DISINTEGRATING ORAL EVERY 8 HOURS PRN
Status: DISCONTINUED | OUTPATIENT
Start: 2023-09-20 | End: 2023-09-22 | Stop reason: HOSPADM

## 2023-09-20 RX ORDER — SODIUM CHLORIDE 0.9 % (FLUSH) 0.9 %
5-40 SYRINGE (ML) INJECTION PRN
Status: DISCONTINUED | OUTPATIENT
Start: 2023-09-20 | End: 2023-09-22 | Stop reason: HOSPADM

## 2023-09-20 RX ORDER — ROSUVASTATIN CALCIUM 10 MG/1
5 TABLET, COATED ORAL DAILY
Status: DISCONTINUED | OUTPATIENT
Start: 2023-09-21 | End: 2023-09-22 | Stop reason: HOSPADM

## 2023-09-20 RX ORDER — DEXTROSE MONOHYDRATE 100 MG/ML
INJECTION, SOLUTION INTRAVENOUS CONTINUOUS PRN
Status: DISCONTINUED | OUTPATIENT
Start: 2023-09-20 | End: 2023-09-22 | Stop reason: HOSPADM

## 2023-09-20 RX ORDER — ACETAMINOPHEN 325 MG/1
650 TABLET ORAL EVERY 6 HOURS PRN
Status: DISCONTINUED | OUTPATIENT
Start: 2023-09-20 | End: 2023-09-22 | Stop reason: HOSPADM

## 2023-09-20 RX ORDER — GLUCAGON 1 MG/ML
1 KIT INJECTION PRN
Status: DISCONTINUED | OUTPATIENT
Start: 2023-09-20 | End: 2023-09-22 | Stop reason: HOSPADM

## 2023-09-20 RX ORDER — INSULIN LISPRO 100 [IU]/ML
0-8 INJECTION, SOLUTION INTRAVENOUS; SUBCUTANEOUS
Status: DISCONTINUED | OUTPATIENT
Start: 2023-09-21 | End: 2023-09-22 | Stop reason: HOSPADM

## 2023-09-20 RX ORDER — CLOPIDOGREL BISULFATE 75 MG/1
75 TABLET ORAL DAILY
Status: DISCONTINUED | OUTPATIENT
Start: 2023-09-21 | End: 2023-09-22 | Stop reason: HOSPADM

## 2023-09-20 RX ORDER — SODIUM CHLORIDE 9 MG/ML
INJECTION, SOLUTION INTRAVENOUS PRN
Status: DISCONTINUED | OUTPATIENT
Start: 2023-09-20 | End: 2023-09-22 | Stop reason: HOSPADM

## 2023-09-20 RX ORDER — INSULIN LISPRO 100 [IU]/ML
0-4 INJECTION, SOLUTION INTRAVENOUS; SUBCUTANEOUS NIGHTLY
Status: DISCONTINUED | OUTPATIENT
Start: 2023-09-20 | End: 2023-09-22 | Stop reason: HOSPADM

## 2023-09-20 RX ORDER — METOPROLOL SUCCINATE 25 MG/1
25 TABLET, EXTENDED RELEASE ORAL DAILY
Status: DISCONTINUED | OUTPATIENT
Start: 2023-09-21 | End: 2023-09-22 | Stop reason: HOSPADM

## 2023-09-20 RX ORDER — SODIUM CHLORIDE 0.9 % (FLUSH) 0.9 %
5-40 SYRINGE (ML) INJECTION EVERY 12 HOURS SCHEDULED
Status: DISCONTINUED | OUTPATIENT
Start: 2023-09-20 | End: 2023-09-22 | Stop reason: HOSPADM

## 2023-09-20 RX ADMIN — SODIUM CHLORIDE, PRESERVATIVE FREE 10 ML: 5 INJECTION INTRAVENOUS at 22:52

## 2023-09-20 RX ADMIN — INSULIN HUMAN 10 UNITS: 100 INJECTION, SOLUTION PARENTERAL at 21:10

## 2023-09-20 ASSESSMENT — ENCOUNTER SYMPTOMS
CHEST TIGHTNESS: 0
NAUSEA: 0
DIARRHEA: 0
ABDOMINAL PAIN: 0
VOMITING: 0
SHORTNESS OF BREATH: 0

## 2023-09-20 NOTE — TELEPHONE ENCOUNTER
[facility] lab called with a critical result for patient. States patient's BUN is 102. 6.      Please advise

## 2023-09-21 ENCOUNTER — APPOINTMENT (OUTPATIENT)
Dept: ULTRASOUND IMAGING | Age: 83
End: 2023-09-21
Payer: MEDICARE

## 2023-09-21 LAB
ANION GAP SERPL CALCULATED.3IONS-SCNC: 11 MMOL/L (ref 3–16)
BASOPHILS # BLD: 0.1 K/UL (ref 0–0.2)
BASOPHILS NFR BLD: 1 %
BUN SERPL-MCNC: 105 MG/DL (ref 7–20)
CALCIUM SERPL-MCNC: 9.2 MG/DL (ref 8.3–10.6)
CHLORIDE SERPL-SCNC: 101 MMOL/L (ref 99–110)
CO2 SERPL-SCNC: 26 MMOL/L (ref 21–32)
CREAT SERPL-MCNC: 2.4 MG/DL (ref 0.8–1.3)
DEPRECATED RDW RBC AUTO: 16.3 % (ref 12.4–15.4)
EKG ATRIAL RATE: 80 BPM
EKG DIAGNOSIS: NORMAL
EKG P AXIS: 68 DEGREES
EKG P-R INTERVAL: 214 MS
EKG Q-T INTERVAL: 378 MS
EKG QRS DURATION: 120 MS
EKG QTC CALCULATION (BAZETT): 435 MS
EKG R AXIS: -75 DEGREES
EKG T AXIS: 64 DEGREES
EKG VENTRICULAR RATE: 80 BPM
EOSINOPHIL # BLD: 0.3 K/UL (ref 0–0.6)
EOSINOPHIL NFR BLD: 4.4 %
EST. AVERAGE GLUCOSE BLD GHB EST-MCNC: 228.8 MG/DL
FERRITIN SERPL IA-MCNC: 118.6 NG/ML (ref 30–400)
GFR SERPLBLD CREATININE-BSD FMLA CKD-EPI: 26 ML/MIN/{1.73_M2}
GLUCOSE BLD-MCNC: 154 MG/DL (ref 70–99)
GLUCOSE BLD-MCNC: 165 MG/DL (ref 70–99)
GLUCOSE BLD-MCNC: 209 MG/DL (ref 70–99)
GLUCOSE BLD-MCNC: 222 MG/DL (ref 70–99)
GLUCOSE SERPL-MCNC: 118 MG/DL (ref 70–99)
HBA1C MFR BLD: 9.6 %
HCT VFR BLD AUTO: 35.3 % (ref 40.5–52.5)
HGB BLD-MCNC: 11.9 G/DL (ref 13.5–17.5)
IRON SATN MFR SERPL: 22 % (ref 20–50)
IRON SERPL-MCNC: 53 UG/DL (ref 59–158)
LYMPHOCYTES # BLD: 1.1 K/UL (ref 1–5.1)
LYMPHOCYTES NFR BLD: 15.6 %
MCH RBC QN AUTO: 29.5 PG (ref 26–34)
MCHC RBC AUTO-ENTMCNC: 33.7 G/DL (ref 31–36)
MCV RBC AUTO: 87.5 FL (ref 80–100)
MONOCYTES # BLD: 0.9 K/UL (ref 0–1.3)
MONOCYTES NFR BLD: 11.9 %
NEUTROPHILS # BLD: 4.9 K/UL (ref 1.7–7.7)
NEUTROPHILS NFR BLD: 67.1 %
PERFORMED ON: ABNORMAL
PLATELET # BLD AUTO: 292 K/UL (ref 135–450)
PMV BLD AUTO: 7.7 FL (ref 5–10.5)
POTASSIUM SERPL-SCNC: 4.7 MMOL/L (ref 3.5–5.1)
RBC # BLD AUTO: 4.04 M/UL (ref 4.2–5.9)
SODIUM SERPL-SCNC: 138 MMOL/L (ref 136–145)
TIBC SERPL-MCNC: 242 UG/DL (ref 260–445)
WBC # BLD AUTO: 7.3 K/UL (ref 4–11)

## 2023-09-21 PROCEDURE — 83540 ASSAY OF IRON: CPT

## 2023-09-21 PROCEDURE — 36415 COLL VENOUS BLD VENIPUNCTURE: CPT

## 2023-09-21 PROCEDURE — 82728 ASSAY OF FERRITIN: CPT

## 2023-09-21 PROCEDURE — 93970 EXTREMITY STUDY: CPT

## 2023-09-21 PROCEDURE — 1200000000 HC SEMI PRIVATE

## 2023-09-21 PROCEDURE — 80048 BASIC METABOLIC PNL TOTAL CA: CPT

## 2023-09-21 PROCEDURE — 85025 COMPLETE CBC W/AUTO DIFF WBC: CPT

## 2023-09-21 PROCEDURE — 93010 ELECTROCARDIOGRAM REPORT: CPT | Performed by: INTERNAL MEDICINE

## 2023-09-21 PROCEDURE — 83550 IRON BINDING TEST: CPT

## 2023-09-21 PROCEDURE — 2580000003 HC RX 258: Performed by: INTERNAL MEDICINE

## 2023-09-21 PROCEDURE — 6370000000 HC RX 637 (ALT 250 FOR IP): Performed by: STUDENT IN AN ORGANIZED HEALTH CARE EDUCATION/TRAINING PROGRAM

## 2023-09-21 PROCEDURE — 6360000002 HC RX W HCPCS: Performed by: STUDENT IN AN ORGANIZED HEALTH CARE EDUCATION/TRAINING PROGRAM

## 2023-09-21 PROCEDURE — 2580000003 HC RX 258: Performed by: STUDENT IN AN ORGANIZED HEALTH CARE EDUCATION/TRAINING PROGRAM

## 2023-09-21 PROCEDURE — 76770 US EXAM ABDO BACK WALL COMP: CPT

## 2023-09-21 RX ORDER — SODIUM CHLORIDE 9 MG/ML
INJECTION, SOLUTION INTRAVENOUS CONTINUOUS
Status: DISCONTINUED | OUTPATIENT
Start: 2023-09-21 | End: 2023-09-22 | Stop reason: HOSPADM

## 2023-09-21 RX ADMIN — CLOPIDOGREL BISULFATE 75 MG: 75 TABLET ORAL at 08:12

## 2023-09-21 RX ADMIN — INSULIN GLARGINE 15 UNITS: 100 INJECTION, SOLUTION SUBCUTANEOUS at 20:12

## 2023-09-21 RX ADMIN — LEVOTHYROXINE SODIUM 25 MCG: 0.03 TABLET ORAL at 08:11

## 2023-09-21 RX ADMIN — HEPARIN SODIUM 5000 UNITS: 5000 INJECTION INTRAVENOUS; SUBCUTANEOUS at 05:37

## 2023-09-21 RX ADMIN — HEPARIN SODIUM 5000 UNITS: 5000 INJECTION INTRAVENOUS; SUBCUTANEOUS at 20:12

## 2023-09-21 RX ADMIN — INSULIN LISPRO 2 UNITS: 100 INJECTION, SOLUTION INTRAVENOUS; SUBCUTANEOUS at 17:20

## 2023-09-21 RX ADMIN — SODIUM CHLORIDE, PRESERVATIVE FREE 10 ML: 5 INJECTION INTRAVENOUS at 08:13

## 2023-09-21 RX ADMIN — SODIUM CHLORIDE: 9 INJECTION, SOLUTION INTRAVENOUS at 11:24

## 2023-09-21 RX ADMIN — ASPIRIN 81 MG: 81 TABLET, COATED ORAL at 08:12

## 2023-09-21 RX ADMIN — HEPARIN SODIUM 5000 UNITS: 5000 INJECTION INTRAVENOUS; SUBCUTANEOUS at 14:14

## 2023-09-21 RX ADMIN — INSULIN LISPRO 2 UNITS: 100 INJECTION, SOLUTION INTRAVENOUS; SUBCUTANEOUS at 11:34

## 2023-09-21 RX ADMIN — ROSUVASTATIN 5 MG: 10 TABLET, FILM COATED ORAL at 08:12

## 2023-09-21 NOTE — PROGRESS NOTES
Unable to review patient medication list at this time as patient does not know his medications. Son in law will bring medication list from home in the morning.

## 2023-09-21 NOTE — H&P
V2.0  History and Physical      Name:  Maranda Torrez /Age/Sex: 1940  (79 y.o. male)   MRN & CSN:  [id number] & [id number] Encounter Date/Time: 2023 8:40 PM EDT   Location:  [facility] PCP: Mariya Felton MD       Hospital Day: 1    Assessment and Plan:   Maranda Torrez is a 79 y.o. male with a pmh of CAD, hypertension, hyperlipidemia, diabetes mellitus who presents with EVER (acute kidney injury) [facility]    Hospital Problems             Last Modified POA    * (Principal) EVER (acute kidney injury) ([address]) 2023 Yes       Plan:  EVER:  -Secondary to most likely prerenal  -Patient presented with chief complaint of abnormal labs. Patient had outpatient lab work in anticipation for nephrology appointment on Friday.  -He was called and asked to present to ED due to elevated creatinine.  -In the ED, patient was hemodynamically stable. Labs are significant for creatinine 2.5, . Patient's creatinine has been gradually worsening since 2023. Patient denies any decrease in his appetite or NSAID use. -Holding Lasix and Jardiance  -Avoid nephrotoxic medications  -IV fluid  -Nephrology consult    2. Right lower extremity edema:  -Patient endorses right lower extremity edema. His right lower extremity edema is worse than his left leg which is new to him. -Doppler ultrasound of bilateral lower extremities    3. Hyperglycemia:  4. Diabetes mellitus:  -In the ED, blood glucose was 473. Patient received 10 units of insulin.  -On Lantus 15 units nightly  -Medium dose sliding scale  -Hypoglycemic protocol  -Monitor glucose      5. CAD:  -Continue aspirin and Plavix and metoprolol  -Continue Crestor    6. Hypertension:  -Continue home meds    6.   Hyperlipidemia:  -Continue Crestor    Disposition:   Current Living situation: Home  Expected Disposition: Home  Estimated D/C: 2 to 3 days    Diet No diet orders on file   DVT Prophylaxis [] Lovenox, [x]  Heparin, [] SCDs, [] Ambulation,  [] Eliquis, []

## 2023-09-21 NOTE — PROGRESS NOTES
4 Eyes Skin Assessment     NAME:  Cb Mcfadden OF BIRTH:  1940  MEDICAL RECORD NUMBER:  [id number]    The patient is being assess for  Admission    I agree that 2 RN's have performed a thorough Head to Toe Skin Assessment on the patient. ALL assessment sites listed below have been assessed. Areas assessed by both nurses:    Head, Face, Ears, Shoulders, Back, Chest, Arms, Elbows, Hands, Sacrum. Buttock, Coccyx, Ischium, and Legs. Feet and Heels        Does the Patient have a Wound? Yes wound(s) were present on assessment.  LDA wound assessment was Initiated and completed by RADHA Rojo Prevention initiated:  No   Wound Care Orders initiated:  No    Pressure Injury (Stage 3,4, Unstageable, DTI, NWPT, and Complex wounds) if present place consult order under [de-identified] NA    New and Established Ostomies if present place consult order under : NA      Nurse 1 eSignature: Electronically signed by May William RN on 9/21/23 at 4:46 AM EDT    **SHARE this note so that the co-signing nurse is able to place an eSignature**    Nurse 2 eSignature: Electronically signed by Nura Monique RN on 9/21/23 at 4:47 AM EDT

## 2023-09-21 NOTE — ED NOTES
ED TO INPATIENT SBAR HANDOFF    Patient Name: Zain Solomon   :  1940  79 y.o. MRN:  [id number]  Preferred Name  Chrystine Beverage  ED Room #:  ED-0022/22  Family/Caregiver Present yes   Restraints no   Sitter no   Sepsis Risk Score Sepsis Risk Score: 2.43    Situation  Code Status: Prior No additional code details. Allergies: Lipitor, Procardia [nifedipine], Statins, Zestril [lisinopril], and Tetracyclines & related  Weight: Patient Vitals for the past 96 hrs (Last 3 readings):   Weight   23 1842 165 lb (74.8 kg)     Arrived from: home  Chief Complaint:   Chief Complaint   Patient presents with    Abnormal Lab     Pt sent for abnormal lab, . Pt denies any s/s, was told to come to ED. Hospital Problem/Diagnosis:  Principal Problem:    EVER (acute kidney injury) ([address])  Resolved Problems:    * No resolved hospital problems. *    Imaging:   XR CHEST PORTABLE   Final Result   1. Streaky opacities in both lower lungs appear approved as compared to the   prior study. Mild vascular prominence. The lungs are otherwise clear.            Abnormal labs:   Abnormal Labs Reviewed   CBC WITH AUTO DIFFERENTIAL - Abnormal; Notable for the following components:       Result Value    RBC 4.17 (*)     Hemoglobin 12.2 (*)     Hematocrit 36.7 (*)     RDW 15.9 (*)     Lymphocytes Absolute 0.9 (*)     All other components within normal limits   COMPREHENSIVE METABOLIC PANEL - Abnormal; Notable for the following components:    Sodium 135 (*)     Chloride 95 (*)     Glucose 473 (*)      (*)     Creatinine 2.5 (*)     Est, Glom Filt Rate 25 (*)     Total Protein 8.5 (*)     Albumin/Globulin Ratio 1.0 (*)     All other components within normal limits    Narrative:     [facility]  [place]  [phone],  Chemistry results called to and read back by Cindi Francisco, 2023  20:27, by [address] - Abnormal; Notable for the following components:    Glucose, Ur >=1000 (*)     All other no       You may also review the ED PT Care Timeline found under the Summary Nursing Index tab. Recommendation    Pending orders 0.45% sodium chloride infusion  Plan for Discharge (if known):    Additional Comments:   If any further questions, please call Sending RN at [phone]    Electronically signed by: Electronically signed by Cherelle Hoff RN on 9/20/2023 at 9:02 PM      Cherelle Hoff RN  09/20/23 2799

## 2023-09-21 NOTE — CARE COORDINATION
Discharge Planning:     (CM) reviewed the patient's chart to assess needs. Patient's Readmission Risk Score is 17%. Patient's medical insurance is Medicare. Patient's PCP is Taz Melara No needs anticipated, at this time. CM team to follow. Staff to inform CM if additional discharge needs arise.      Electronically signed by Minnie Alfaro on 9/21/23 at 8:32 AM EDT

## 2023-09-21 NOTE — CONSULTS
MD Isha Holloway MD Thurlow Cluck, MD                  Office: [phone]                      Fax: [phone] [address] [employer]                   NEPHROLOGY INITIAL CONSULT NOTE:     PATIENT NAME: Bhumi Durand  : 1940  MRN: [id number]  REASON FOR CONSULT: For evaluation and management of Acute Kidney Injury . (My recommendations will be communicated by way of shared medical record.)      RECOMMENDATIONS:   -check :   FOBT, d/to severe azotemia, r/o UGIB  Iron stores  Renal US, r/o hydro w/ h/o stones  urine lytes,  monitor PVR w/ bladder scan for gaona insertion need. Mx:   -NS - higher rate 100 cc/hr  - no HF hx , monitor for fluid overload   - hold Lasix, Jardiance, Metformin   - ISS for BG control   - needs better A1C, as last 9.6    -no need for dialysis,    -at higher risk for decompensation, needing closer monitoring. D/C plan from renal stand point:  - likely ~2-3 days d/to severe Azotemia     D/W pt, team nurse , hospitalist Dr Merrilee Opitz:       Admitted on:  2023  6:53 PM   For:  Elevated BUN [R79.9]  EVER (acute kidney injury) ([address]) [N17.9]  Acute renal failure, unspecified acute renal failure type ([address]) [N17.9]  Type 2 diabetes mellitus with hyperglycemia, with long-term current use of insulin (HCC) [E11.65, Z79.4]       EVER (on non-proteinuric CKD: stage 3a ):   Follows w/ me     - BL Scr- 1.5  as off 2023   ->  2.5 on admission  - Etiology of EVER - presumed pre-renal,   - at risk for ATN w/ hypovolemia     - other differentials: unlikely other GN / TI / TMA process  - UA : results reviewed: + glu (Was on SGLTs2) otherwise bland   - Renal imaging: results reviewed: from  w/ CT : 2 mm nonobstructing calculus in the right kidney. \":       Associated problems:   - Volume status: hypovolemic  : HTN : no need for tight control    : Na:  hyponatremia - mild chronic     - Azotemia: pre-renal - severe 100s on admission ? Arthritis Father     No Known Problems Daughter     No Known Problems Daughter     No Known Problems Daughter     Lupus Neg Hx     Rheum Arthritis Neg Hx     Heart Disease Neg Hx      SOCIAL HISTORY:   Social History     Socioeconomic History    Marital status:    Tobacco Use    Smoking status: Never     Passive exposure: Past    Smokeless tobacco: Never   Vaping Use    Vaping Use: Never used   Substance and Sexual Activity    Alcohol use: Yes     Alcohol/week: 0.0 standard drinks of alcohol     Comment: occasional wine    Drug use: No    Sexual activity: Yes     Partners: Female     Social Determinants of Health     Financial Resource Strain: Low Risk  (6/1/2023)    Overall Financial Resource Strain (CARDIA)     Difficulty of Paying Living Expenses: Not hard at all   Food Insecurity: No Food Insecurity (6/1/2023)    Hunger Vital Sign     Worried About Running Out of Food in the Last Year: Never true     [address] in the Last Year: Never true   Transportation Needs: Unknown (6/1/2023)    PRAPARE - Transportation     Lack of Transportation (Non-Medical): No   Housing Stability: Unknown (6/1/2023)    Housing Stability Vital Sign     Unstable Housing in the Last Year: No         MEDICATIONS: reviewed by me. Medications Prior to Admission:  No current facility-administered medications on file prior to encounter.      Current Outpatient Medications on File Prior to Encounter   Medication Sig Dispense Refill    hydrALAZINE (APRESOLINE) 25 MG tablet Take 0.5 tablets by mouth in the morning and at bedtime 90 tablet 0    empagliflozin (JARDIANCE) 10 MG tablet Take 1 tablet by mouth daily 30 tablet 2    furosemide (LASIX) 40 MG tablet Take 1 tablet by mouth daily 90 tablet 3    carbidopa-levodopa (SINEMET)  MG per tablet TAKE 1 AND 1/2 TABLET BY MOUTH THREE TIMES A  tablet 1    fluticasone (FLONASE) 50 MCG/ACT nasal spray 2 sprays by Each Nostril route daily 16 g 5    rosuvastatin (CRESTOR) 5 MG 02:37 PM     RF:    Lab Results   Component Value Date/Time    RF <10.0 08/04/2023 02:37 PM     DSDNA:  No components found for: \"DNA\"  AMYLASE:  No results found for: \"AMYLASE\"  LIPASE:  No results found for: \"LIPASE\"  Fibrinogen Level:  No components found for: \"FIB\"       BELOW MENTIONED RADIOLOGY STUDY RESULTS BY ME (AS NEEDED FOR MY EVALUATION AND MANAGEMENT). XR CHEST PORTABLE    Result Date: 9/20/2023  EXAMINATION: ONE XRAY VIEW OF THE CHEST 9/20/2023 7:32 pm COMPARISON: CXR dated 07/14/2023 HISTORY: ORDERING SYSTEM PROVIDED HISTORY: SOB TECHNOLOGIST PROVIDED HISTORY: Reason for exam:->SOB Reason for Exam: SOB FINDINGS: Medical devices: Left shoulder arthroplasty. Mediastinum/Heart: Calcification in the left mediastinum, unchanged. The thoracic aorta is tortuous. The heart is of normal size. Lungs: Streaky opacities in both lower lungs appear approved as compared to the prior study. Mild vascular prominence. The lungs are otherwise clear. Pleura: No pleural effusion. No pneumothorax. 1. Streaky opacities in both lower lungs appear approved as compared to the prior study. Mild vascular prominence. The lungs are otherwise clear. Imaging: [unfilled]         ======================================================================  Please note that this chart entry has been generated using voice recognition software, mainly. So please excuse brevity and/or typos. While every effort and attempts have been made to ensure the accuracy of this automated transcription, some errors may have occurred; and certain words and phrases in transcription may not be entered as intended. However, inadvertent computerized transcription errors may be present. So please contact us if any clarification needed.

## 2023-09-22 VITALS
TEMPERATURE: 97.6 F | BODY MASS INDEX: 26.13 KG/M2 | SYSTOLIC BLOOD PRESSURE: 137 MMHG | RESPIRATION RATE: 13 BRPM | DIASTOLIC BLOOD PRESSURE: 65 MMHG | WEIGHT: 166.5 LBS | HEIGHT: 67 IN | OXYGEN SATURATION: 95 % | HEART RATE: 80 BPM

## 2023-09-22 LAB
ANION GAP SERPL CALCULATED.3IONS-SCNC: 8 MMOL/L (ref 3–16)
BASOPHILS # BLD: 0.1 K/UL (ref 0–0.2)
BASOPHILS NFR BLD: 1.1 %
BUN SERPL-MCNC: 63 MG/DL (ref 7–20)
CALCIUM SERPL-MCNC: 9 MG/DL (ref 8.3–10.6)
CHLORIDE SERPL-SCNC: 106 MMOL/L (ref 99–110)
CO2 SERPL-SCNC: 24 MMOL/L (ref 21–32)
CREAT SERPL-MCNC: 1.8 MG/DL (ref 0.8–1.3)
DEPRECATED RDW RBC AUTO: 16 % (ref 12.4–15.4)
EOSINOPHIL # BLD: 0.3 K/UL (ref 0–0.6)
EOSINOPHIL NFR BLD: 5 %
GFR SERPLBLD CREATININE-BSD FMLA CKD-EPI: 37 ML/MIN/{1.73_M2}
GLUCOSE BLD-MCNC: 154 MG/DL (ref 70–99)
GLUCOSE BLD-MCNC: 92 MG/DL (ref 70–99)
GLUCOSE SERPL-MCNC: 92 MG/DL (ref 70–99)
HCT VFR BLD AUTO: 35.4 % (ref 40.5–52.5)
HEMOCCULT STL QL: NORMAL
HGB BLD-MCNC: 11.7 G/DL (ref 13.5–17.5)
LYMPHOCYTES # BLD: 1.2 K/UL (ref 1–5.1)
LYMPHOCYTES NFR BLD: 18 %
MCH RBC QN AUTO: 29.3 PG (ref 26–34)
MCHC RBC AUTO-ENTMCNC: 33.1 G/DL (ref 31–36)
MCV RBC AUTO: 88.4 FL (ref 80–100)
MONOCYTES # BLD: 0.8 K/UL (ref 0–1.3)
MONOCYTES NFR BLD: 12.2 %
NEUTROPHILS # BLD: 4.4 K/UL (ref 1.7–7.7)
NEUTROPHILS NFR BLD: 63.7 %
PERFORMED ON: ABNORMAL
PERFORMED ON: NORMAL
PLATELET # BLD AUTO: 280 K/UL (ref 135–450)
PMV BLD AUTO: 7.1 FL (ref 5–10.5)
POTASSIUM SERPL-SCNC: 4.7 MMOL/L (ref 3.5–5.1)
RBC # BLD AUTO: 4 M/UL (ref 4.2–5.9)
SODIUM SERPL-SCNC: 138 MMOL/L (ref 136–145)
WBC # BLD AUTO: 6.8 K/UL (ref 4–11)

## 2023-09-22 PROCEDURE — 97530 THERAPEUTIC ACTIVITIES: CPT

## 2023-09-22 PROCEDURE — 97161 PT EVAL LOW COMPLEX 20 MIN: CPT

## 2023-09-22 PROCEDURE — 80048 BASIC METABOLIC PNL TOTAL CA: CPT

## 2023-09-22 PROCEDURE — 85025 COMPLETE CBC W/AUTO DIFF WBC: CPT

## 2023-09-22 PROCEDURE — 97165 OT EVAL LOW COMPLEX 30 MIN: CPT

## 2023-09-22 PROCEDURE — 2580000003 HC RX 258: Performed by: STUDENT IN AN ORGANIZED HEALTH CARE EDUCATION/TRAINING PROGRAM

## 2023-09-22 PROCEDURE — 82270 OCCULT BLOOD FECES: CPT

## 2023-09-22 PROCEDURE — 2580000003 HC RX 258: Performed by: INTERNAL MEDICINE

## 2023-09-22 PROCEDURE — 94760 N-INVAS EAR/PLS OXIMETRY 1: CPT

## 2023-09-22 PROCEDURE — 97116 GAIT TRAINING THERAPY: CPT

## 2023-09-22 PROCEDURE — 6360000002 HC RX W HCPCS: Performed by: STUDENT IN AN ORGANIZED HEALTH CARE EDUCATION/TRAINING PROGRAM

## 2023-09-22 PROCEDURE — 6370000000 HC RX 637 (ALT 250 FOR IP): Performed by: STUDENT IN AN ORGANIZED HEALTH CARE EDUCATION/TRAINING PROGRAM

## 2023-09-22 PROCEDURE — 36415 COLL VENOUS BLD VENIPUNCTURE: CPT

## 2023-09-22 PROCEDURE — 51798 US URINE CAPACITY MEASURE: CPT

## 2023-09-22 RX ADMIN — ASPIRIN 81 MG: 81 TABLET, COATED ORAL at 09:39

## 2023-09-22 RX ADMIN — SODIUM CHLORIDE: 9 INJECTION, SOLUTION INTRAVENOUS at 09:50

## 2023-09-22 RX ADMIN — CLOPIDOGREL BISULFATE 75 MG: 75 TABLET ORAL at 09:36

## 2023-09-22 RX ADMIN — SODIUM CHLORIDE, PRESERVATIVE FREE 5 ML: 5 INJECTION INTRAVENOUS at 09:50

## 2023-09-22 RX ADMIN — HEPARIN SODIUM 5000 UNITS: 5000 INJECTION INTRAVENOUS; SUBCUTANEOUS at 05:16

## 2023-09-22 RX ADMIN — LEVOTHYROXINE SODIUM 25 MCG: 0.03 TABLET ORAL at 09:38

## 2023-09-22 RX ADMIN — ROSUVASTATIN 5 MG: 10 TABLET, FILM COATED ORAL at 09:39

## 2023-09-22 RX ADMIN — METOPROLOL SUCCINATE 25 MG: 25 TABLET, FILM COATED, EXTENDED RELEASE ORAL at 09:37

## 2023-09-22 NOTE — PROGRESS NOTES
[facility] Department   Phone: [phone]    Physical Therapy    [x] Initial Evaluation            [] Daily Treatment Note         [] Discharge Summary      Patient: Denny Paz   : 1940   MRN: [id number]   Date of Service:  2023  Admitting Diagnosis: EVER (acute kidney injury) [facility])  Current Admission Summary: The pt was admitted with hyperglycemia and elevated BUN. Past Medical History:  has a past medical history of Arthropathy, unspecified, site unspecified, Hypertension, Hypertrophy of prostate without urinary obstruction and other lower urinary tract symptoms (LUTS), Impotence of organic origin, Inguinal hernia, Myalgia and myositis, unspecified, Other and unspecified hyperlipidemia, Retinal microaneurysms NOS, Skin cancer, and Type II or unspecified type diabetes mellitus without mention of complication, not stated as uncontrolled. Past Surgical History:  has a past surgical history that includes Lumbar disc surgery (); Cataract removal (Bilateral); Inguinal hernia repair (Left); Coronary angioplasty with stent (2013); Skin cancer excision; and shoulder surgery (Left, 2019). Discharge Recommendations: Denny Paz scored a 20/24 on the AM-PAC short mobility form. At this time, no further PT is recommended upon discharge as the pt feels safe to Dc home and recently had HHPT. Recommend patient returns to prior setting with prior services. DME Required For Discharge: patient has all required DME for discharge  Precautions/Restrictions: high fall risk  Weight Bearing Restrictions: no restrictions      Pre-Admission Information   Lives With: alone    Type of Home: house  Home Layout: tri-level, pt can stay on main floor of home and has laundry on that level, pt does have stair lifts if he was going to go upstairs or down  Home Access:  2 step to enter with handrail. Handrails are located on R side.  Pt also has a w/c lift to Mechanics: R genu varum, slow ashleigh, decreased step length, leans R  Comments:    Stair Mobility:  Stair mobility not completed on this date. Comments:  Wheelchair Mobility:  No w/c mobility completed on this date. Comments:  Balance:  Static Sitting Balance: good: independent with functional balance in unsupported position  Dynamic Sitting Balance: good: independent with functional balance in unsupported position  Static Standing Balance: fair (-): maintains balance at SBA with use of UE support  Dynamic Standing Balance: fair (-): maintains balance at SBA with use of UE support  Comments: The pt donned shoes sitting EOB safely. He stood in a normal HÉCTOR with eyes open and closed for 20 seconds, CGA, no LOB. Pt requires hand held assist to adjust his feet position. Other Therapeutic Interventions  Pt has a stepper machine and was educated on benefits of exercise to prevent further Parkinson's decline. Functional Outcomes  AM-PAC Inpatient Mobility Raw Score : 20              Cognition  WFL  Orientation:    alert and oriented x 4  Command Following:   [facility]    Education  Barriers To Learning: none  Patient Education: patient educated on PT role and benefits, plan of care, discharge recommendations, importance of daily exercise  Learning Assessment:  patient verbalizes and demonstrates understanding    Assessment  Activity Tolerance: Good  Impairments Requiring Therapeutic Intervention: decreased balance, decreased coordination  Prognosis: good  Clinical Assessment: The pt presents with decreased balance and coordination related to Parkinson's disease. He has good support at home and has the appropriate DME. He needs a walker at home at all times. He will benefit from daily exercise to combat his Parkinson's symptoms.    Safety Interventions: patient left in bed, bed alarm in place, call light within reach, nurse notified, and family/caregiver present    Plan  Frequency: 3-5 x/per week  Current

## 2023-09-22 NOTE — PROGRESS NOTES
[address] [facility] Department   Phone: [phone]    Occupational Therapy    [x] Initial Evaluation            [] Daily Treatment Note         [] Discharge Summary      Patient: Myrna Gitelman   : 1940   MRN: [id number]   Date of Service:  2023    Admitting Diagnosis:  EVER (acute kidney injury) [facility])  Current Admission Summary: 79 y.o. male who presents to the emergency department with abnormal labs that were completed outpatient in anticipation for nephrology appointment this Friday. The patient does have history of CKD and was due to see Dr. Richmond Norwood Friday. He is asymptomatic, making urine and taking medications as directed. Discontinued NSAIDs several months ago once instructed. He has been taking Jardiance, this was prescribed by primary care and is a new prescription within the past 1 month. Past Medical History:  has a past medical history of Arthropathy, unspecified, site unspecified, Hypertension, Hypertrophy of prostate without urinary obstruction and other lower urinary tract symptoms (LUTS), Impotence of organic origin, Inguinal hernia, Myalgia and myositis, unspecified, Other and unspecified hyperlipidemia, Retinal microaneurysms NOS, Skin cancer, and Type II or unspecified type diabetes mellitus without mention of complication, not stated as uncontrolled. Past Surgical History:  has a past surgical history that includes Lumbar disc surgery (); Cataract removal (Bilateral); Inguinal hernia repair (Left); Coronary angioplasty with stent (2013); Skin cancer excision; and shoulder surgery (Left, 2019). Discharge Recommendations: Myrna Gitelman scored a 21/24 on the AM-PAC ADL Inpatient form. At this time, no further OT is recommended upon discharge due to anticipate return to baseline at discharge. Recommend patient returns to prior setting with prior services.        DME Required For Discharge: patient has all required DME

## 2023-09-22 NOTE — CARE COORDINATION
09/22/23 1227   IMM Letter   IMM Letter given to Patient/Family/Significant other/Guardian/POA/by: Given to patient by Kevon GARCIA   IMM Letter date given: 09/22/23   IMM Letter time given: 1228     Electronically signed by Radha Mccann on 9/22/23 at 12:29 PM EDT

## 2023-09-25 ENCOUNTER — CARE COORDINATION (OUTPATIENT)
Dept: CASE MANAGEMENT | Age: 83
End: 2023-09-25

## 2023-09-25 DIAGNOSIS — N17.9 AKI (ACUTE KIDNEY INJURY) (HCC): Primary | ICD-10-CM

## 2023-09-25 PROCEDURE — 1111F DSCHRG MED/CURRENT MED MERGE: CPT | Performed by: FAMILY MEDICINE

## 2023-09-25 NOTE — CARE COORDINATION
[facility] Care Transitions Initial Follow Up Call    Call within 2 business days of discharge: Yes    Patient Current Location:  Home: [address] [place] [address]    Care Transition Nurse contacted the patient by telephone to perform post hospital discharge assessment. Verified name and  with patient as identifiers. Provided introduction to self, and explanation of the Care Transition Nurse role. Patient: Ravi Sousa Patient : 1940   MRN: [id number]  Reason for Admission: Elevated BUN, EVER, renal failure, T2DM  Discharge Date: 23 RARS: Readmission Risk Score: 16.3      Last Discharge [address] [facility] [facility]       Date Complaint Diagnosis Description Type Department Provider    23 Abnormal Lab Acute renal failure, unspecified acute renal failure type ([address] ) . .. ED to Hosp-Admission (Discharged) (ADMITTED) Mirta Parada MD; Nhan Chase. .. Was this an external facility discharge? No Discharge Facility:     Challenges to be reviewed by the provider   Additional needs identified to be addressed with provider: No  none               Method of communication with provider: none. Patient reports that he is doing well, denies any questions or concerns at this time. Discussed discharge instructions and reviewed medications, 1111F completed. He has a follow up with PCP 10/20/23, they have sent PCP office a message requesting that appointment be moved up for a hospital follow up appointment. CTN will continue with outreach follow up calls. Care Transition Nurse reviewed discharge instructions, medical action plan, and red flags with patient who verbalized understanding. The patient was given an opportunity to ask questions and does not have any further questions or concerns at this time. Were discharge instructions available to patient? Yes.  Reviewed appropriate site of care based on symptoms and resources available to patient including: PCP  Urgent care clinics  When

## 2023-09-27 ENCOUNTER — CARE COORDINATION (OUTPATIENT)
Dept: CASE MANAGEMENT | Age: 83
End: 2023-09-27

## 2023-09-27 ENCOUNTER — OFFICE VISIT (OUTPATIENT)
Dept: FAMILY MEDICINE CLINIC | Age: 83
End: 2023-09-27

## 2023-09-27 VITALS
SYSTOLIC BLOOD PRESSURE: 110 MMHG | DIASTOLIC BLOOD PRESSURE: 60 MMHG | OXYGEN SATURATION: 97 % | HEART RATE: 78 BPM | WEIGHT: 164.6 LBS | BODY MASS INDEX: 25.78 KG/M2 | TEMPERATURE: 97.4 F

## 2023-09-27 DIAGNOSIS — Z09 HOSPITAL DISCHARGE FOLLOW-UP: Primary | ICD-10-CM

## 2023-09-27 DIAGNOSIS — Z79.4 TYPE 2 DIABETES MELLITUS WITH HYPERGLYCEMIA, WITH LONG-TERM CURRENT USE OF INSULIN (HCC): ICD-10-CM

## 2023-09-27 DIAGNOSIS — N18.32 STAGE 3B CHRONIC KIDNEY DISEASE (HCC): ICD-10-CM

## 2023-09-27 DIAGNOSIS — E11.65 TYPE 2 DIABETES MELLITUS WITH HYPERGLYCEMIA, WITH LONG-TERM CURRENT USE OF INSULIN (HCC): ICD-10-CM

## 2023-09-27 RX ORDER — PEN NEEDLE, DIABETIC 31 G X1/4"
1 NEEDLE, DISPOSABLE MISCELLANEOUS DAILY
Qty: 100 EACH | Refills: 3 | Status: SHIPPED | OUTPATIENT
Start: 2023-09-27

## 2023-09-27 RX ORDER — INSULIN GLARGINE 100 [IU]/ML
10 INJECTION, SOLUTION SUBCUTANEOUS NIGHTLY
Qty: 5 ADJUSTABLE DOSE PRE-FILLED PEN SYRINGE | Refills: 3 | Status: SHIPPED | OUTPATIENT
Start: 2023-09-27

## 2023-09-27 NOTE — ASSESSMENT & PLAN NOTE
Last a1c 9.6%  Goal <8%  Current regimen: Insulin lispro protamine and lispro (short and intermediate combination); metformin discontinued in hospitalization due to GFR, Jardiance okay per Nephrology but patient reported he stopped taking.  (ADDEND: Discussed with Nephrologist covering, agree to restart Jardiance)  Per chart review- patient was started on Lantus 10 units in the hospital and did well   ACE/ARB: No, contraindicated due to /60 today  Statin: Yes  Diabetic Foot exam: UTD but due in a few days, will do at next appt  Nephropathy screening: Positive, following with Nephrology  Retinopathy screening: UTD  PNA vaccine: PCV20 not needed, but will discuss at next appt  Plan  - Discontinue lispro, start Lantus 10 units  - Restart Jardiance  - Follow up in 1 week to consider adding GLP, he will bring glucose logs with him to this visit

## 2023-09-27 NOTE — PROGRESS NOTES
Steffany العلي (:  1940) is a 79 y.o. male,{New vs Established:[id number]::\"Established patient\"}, here for evaluation of the following chief complaint(s):  Follow-up [facility] F/U- Admitted to [facility] with acute kidney failure. )      SUBJECTIVE/OBJECTIVE:  HPI    Review of Systems    There were no vitals taken for this visit. Physical Exam    ASSESSMENT/PLAN:  {There are no diagnoses linked to this encounter. (Refresh or delete this SmartLink)}    Health Maintenance Due   Topic Date Due    Hepatitis B vaccine (1 of 3 - Risk 3-dose series) Never done    COVID-19 Vaccine (3 - Pfizer series) 2021    Annual Wellness Visit (AWV)  2023    Flu vaccine (1) 2023    Prostate Specific Antigen (PSA) Screening or Monitoring  2023    Diabetic foot exam  2023        No follow-ups on file. An electronic signature was used to authenticate this note. Electronically signed by Luis Roth LPN on 3/84/8851 at 1:62 AM.    Please note, documentation for this visit was generated using dragon dictation software. Although every effort was made to ensure accuracy; inadvertent, unintentional transcription errors may have occurred.
nitroGLYCERIN 0.4 MG SL tablet  Commonly known as: Nitrostat  Place 1 tablet under the tongue every 5 minutes as needed for Chest pain     rosuvastatin 5 MG tablet  Commonly known as: CRESTOR  TAKE ONE TABLET BY MOUTH DAILY     triamcinolone 0.1 % ointment  Commonly known as: KENALOG  APPLY TO AFFECTED AREA(S) TWO TIMES A DAY FOR 7 DAYS     vitamin D 50 MCG (2000 UT) Caps capsule  Take 2,000 Units by mouth Daily               Where to Get Your Medications        These medications were sent to [facility] [id number] [facility], [address] - P [phone] Everton Mcclelland [phone]  [address] [place] [address], [address] [place] [address]  [address]      Phone: [phone]   [employer] Pen [place] 31G X 6 MM Misc  Lantus SoloStar 100 UNIT/ML injection pen           Medications marked \"taking\" at this time  Outpatient Medications Marked as Taking for the 9/27/23 encounter (Office Visit) with Yaz Shelby MD   Medication Sig Dispense Refill    insulin glargine (LANTUS SOLOSTAR) 100 UNIT/ML injection pen Inject 10 Units into the skin nightly 5 Adjustable Dose Pre-filled Pen Syringe 3    empagliflozin (JARDIANCE) 10 MG tablet Take 1 tablet by mouth daily      Insulin Pen Needle ([employer] PEN NEEDLES) 31G X 6 MM MISC 1 each by Does not apply route daily 100 each 3    hydrALAZINE (APRESOLINE) 25 MG tablet Take 0.5 tablets by mouth in the morning and at bedtime 90 tablet 0    carbidopa-levodopa (SINEMET)  MG per tablet TAKE 1 AND 1/2 TABLET BY MOUTH THREE TIMES A  tablet 1    fluticasone (FLONASE) 50 MCG/ACT nasal spray 2 sprays by Each Nostril route daily 16 g 5    rosuvastatin (CRESTOR) 5 MG tablet TAKE ONE TABLET BY MOUTH DAILY 90 tablet 3    Multiple Vitamins-Minerals (ICAPS AREDS 2 PO) Take by mouth      levothyroxine (SYNTHROID) 25 MCG tablet Take 1 tablet by mouth daily 90 tablet 1    insulin lispro protamine & lispro (HUMALOG MIX) (75-25) 100 UNIT per ML SUPN injection pen 20-60 units BID with meals based on sugar 75 mL 2    [employer] PEN

## 2023-09-27 NOTE — ASSESSMENT & PLAN NOTE
Stage 3BA1, likely secondary to uncontrolled diabetes  - Per KDIGO guidelines, he needs monitoring twice a year and to establish with Nephrology  Plan  - Continue to work on diabetes control  - Follow up with Nephrology

## 2023-09-29 NOTE — PROGRESS NOTES
diagnosis  -- Disagree - Not applicable / Not valid  -- Disagree - Clinically unable to determine / Unknown  -- Refer to Clinical Documentation Reviewer    PROVIDER RESPONSE TEXT:    Hyponatremia is present as evidenced by Na 135    Query created by:  Kendall Wyman on 9/28/2023 10:09 AM      Electronically signed by:  Darrel Ayala MD 9/29/2023 1:21 PM

## 2023-10-02 ENCOUNTER — CARE COORDINATION (OUTPATIENT)
Dept: CASE MANAGEMENT | Age: 83
End: 2023-10-02

## 2023-10-02 NOTE — CARE COORDINATION
[facility] Care Transitions Follow Up Call    Patient Current Location:  Home: [address] [facility] [address]    Care Transition Nurse contacted the patient by telephone. Verified name and  with patient as identifiers. Patient: Yosef Santos  Patient : 1940   MRN: [id number]  Reason for Admission: Elevated BUN, EVER, renal failure, T2DM  Discharge Date: 23 RARS: Readmission Risk Score: 16.3      Needs to be reviewed by the provider   Additional needs identified to be addressed with provider: No  none             Method of communication with provider: none. Patient reports that he has followed up with PCP and denies any questions or concerns at this time. He will see Dr. Lilly Cons 10/20/23. CTN will continue with outreach follow up calls. Follow Up  Future Appointments   Date Time Provider [address]   10/4/2023  9:00 AM Alexandria Xiao  [facility]   10/20/2023  7:30 AM Avinash Gaytan MD [address] [place] [address]   10/20/2023  4:30 PM Gus Cash MD [facility]O   2023  9:30 AM Sebastien Hernandez MD PULM & CC MMA   2024 11:15 AM Citlalli Irizarry MD  NEURO Neurology -     Non-[facility] follow up appointment(s):     Care Transition Nurse reviewed red flags with patient and discussed any barriers to care and/or understanding of plan of care after discharge. Discussed appropriate site of care based on symptoms and resources available to patient including: PCP  Urgent care clinics  When to call 911. The patient agrees to contact the PCP office for questions related to their healthcare. Offered patient enrollment in the Remote Patient Monitoring (RPM) program for in-home monitoring:  DM, HTN, kidney disease .      Care Transitions Subsequent and Final Call    Subsequent and Final Calls  Do you have any ongoing symptoms?: No  Have your medications changed?: No  Do you have any questions related to your medications?: No  Do you currently have any active

## 2023-10-04 ENCOUNTER — OFFICE VISIT (OUTPATIENT)
Dept: FAMILY MEDICINE CLINIC | Age: 83
End: 2023-10-04

## 2023-10-04 VITALS
WEIGHT: 166.2 LBS | BODY MASS INDEX: 26.03 KG/M2 | HEART RATE: 66 BPM | TEMPERATURE: 98.7 F | DIASTOLIC BLOOD PRESSURE: 62 MMHG | SYSTOLIC BLOOD PRESSURE: 118 MMHG | OXYGEN SATURATION: 98 %

## 2023-10-04 DIAGNOSIS — E11.65 TYPE 2 DIABETES MELLITUS WITH HYPERGLYCEMIA, WITH LONG-TERM CURRENT USE OF INSULIN (HCC): Primary | ICD-10-CM

## 2023-10-04 DIAGNOSIS — Z23 NEED FOR VACCINATION: ICD-10-CM

## 2023-10-04 DIAGNOSIS — Z79.4 TYPE 2 DIABETES MELLITUS WITH HYPERGLYCEMIA, WITH LONG-TERM CURRENT USE OF INSULIN (HCC): Primary | ICD-10-CM

## 2023-10-04 ASSESSMENT — ENCOUNTER SYMPTOMS
DIARRHEA: 0
NAUSEA: 0
ABDOMINAL PAIN: 0
COUGH: 0
SHORTNESS OF BREATH: 0
VOMITING: 0

## 2023-10-04 NOTE — PROGRESS NOTES
Tenderness: There is no abdominal tenderness. There is no guarding or rebound. Musculoskeletal:         General: Normal range of motion. Cervical back: Normal range of motion. Right lower leg: Edema present. Left lower leg: Edema present. Skin:     General: Skin is warm and dry. Neurological:      General: No focal deficit present. Mental Status: He is alert. Psychiatric:         Mood and Affect: Mood normal.         Behavior: Behavior normal.         Visual inspection:  Deformity/amputation: absent  Skin lesions/pre-ulcerative calluses: present  Edema: right- 2+, left- 2+    Sensory exam:  Monofilament sensation: abnormal - decreased sensation  (minimum of 5 random plantar locations tested, avoiding callused areas - > 1 area with absence of sensation is + for neuropathy)    Plus at least one of the following:  Pulses: normal,     ASSESSMENT/PLAN:  1. Type 2 diabetes mellitus with hyperglycemia, with long-term current use of insulin (HCC)  Assessment & Plan:  Last a1c 9.6%  Goal <8%  Current regimen: Insulin lispro protamine and lispro (short and intermediate combination); metformin discontinued in hospitalization due to GFR, Jardiance okay per Nephrology but patient reported he stopped taking. (ADDEND: Discussed with Nephrologist covering, agree to restart Jardiance)  Per chart review- patient was started on Lantus 10 units in the hospital and did well   ACE/ARB: No, contraindicated due to BP  Statin: Yes  Diabetic Foot exam: Done today, positive; following with Podiatry  Nephropathy screening: Positive, following with Nephrology  Retinopathy screening: UTD  PNA vaccine: PCV20 not needed per guidelines, but shared decision making- he would like the shot today  Plan  - Continue Lantus 10 units  - Restart Jardiance  - Follow up with PCP in 2 weeks  Orders:  -     Diabetic Foot Exam  2.  Need for vaccination  -     Pneumococcal, PCV20, PREVNAR 21, (age 25 yrs+), IM, PF  -     Influenza,

## 2023-10-04 NOTE — ASSESSMENT & PLAN NOTE
Last a1c 9.6%  Goal <8%  Current regimen: Insulin lispro protamine and lispro (short and intermediate combination); metformin discontinued in hospitalization due to GFR, Jardiance okay per Nephrology but patient reported he stopped taking.  (ADDEND: Discussed with Nephrologist covering, agree to restart Jardiance)  Per chart review- patient was started on Lantus 10 units in the hospital and did well   ACE/ARB: No, contraindicated due to BP  Statin: Yes  Diabetic Foot exam: Done today, positive; following with Podiatry  Nephropathy screening: Positive, following with Nephrology  Retinopathy screening: UTD  PNA vaccine: PCV20 not needed per guidelines, but shared decision making- he would like the shot today  Plan  - Continue Lantus 10 units  - Restart Jardiance  - Follow up with PCP in 2 weeks

## 2023-10-09 ENCOUNTER — CARE COORDINATION (OUTPATIENT)
Dept: CASE MANAGEMENT | Age: 83
End: 2023-10-09

## 2023-10-09 NOTE — TELEPHONE ENCOUNTER
Medication:   Requested Prescriptions     Pending Prescriptions Disp Refills    metFORMIN (GLUCOPHAGE) 500 MG tablet 180 tablet 1     Sig: Take 1 tablet by mouth with breakfast and with evening meal with meals      A note states that Dr. Jessica De Dios discontinued due to side effects.   Please advise     Patient Phone Number: [phone] (home)     Last appt: 10/4/2023   Next appt: 10/20/2023    Last OARRS:        No data to display              PDMP Monitoring:    Last PDMP Maya Pebbles as Reviewed [facility]):  Review User Review Instant Review Result          Preferred Pharmacy:   [facility] [id number] Lucian Ramírez, [address] [place] [address] [phone] Waldemar [facility] [phone]  [address]  [address]  Phone: [phone] Fax: [phone]

## 2023-10-09 NOTE — CARE COORDINATION
with?: No  Identified Barriers: None  Care Transitions Interventions                          Other Interventions:             Care Transition Nurse provided contact information for future needs. Plan for follow-up call in 5-7 days based on severity of symptoms and risk factors.   Plan for next call: symptom management-.  self management-.  follow-up appointment-.    Bart Conti RN

## 2023-10-13 ENCOUNTER — HOSPITAL ENCOUNTER (OUTPATIENT)
Age: 83
Discharge: HOME | End: 2023-10-13
Payer: MEDICARE

## 2023-10-13 DIAGNOSIS — N17.9 ACUTE KIDNEY INJURY SUPERIMPOSED ON CHRONIC KIDNEY DISEASE (HCC): ICD-10-CM

## 2023-10-13 DIAGNOSIS — N18.2 TYPE 2 DM WITH CKD STAGE 2 AND HYPERTENSION (HCC): ICD-10-CM

## 2023-10-13 DIAGNOSIS — R60.0 EDEMA LEG: ICD-10-CM

## 2023-10-13 DIAGNOSIS — N18.9 ACUTE KIDNEY INJURY SUPERIMPOSED ON CHRONIC KIDNEY DISEASE (HCC): ICD-10-CM

## 2023-10-13 DIAGNOSIS — E87.5 HYPERKALEMIA: ICD-10-CM

## 2023-10-13 DIAGNOSIS — I12.9 TYPE 2 DM WITH CKD STAGE 2 AND HYPERTENSION (HCC): ICD-10-CM

## 2023-10-13 DIAGNOSIS — E11.22 TYPE 2 DM WITH CKD STAGE 2 AND HYPERTENSION (HCC): ICD-10-CM

## 2023-10-13 LAB
25(OH)D3 SERPL-MCNC: 51.1 NG/ML
ALBUMIN SERPL-MCNC: 4.1 G/DL (ref 3.4–5)
ANION GAP SERPL CALCULATED.3IONS-SCNC: 12 MMOL/L (ref 3–16)
BACTERIA URNS QL MICRO: ABNORMAL /HPF
BILIRUB UR QL STRIP.AUTO: NEGATIVE
BUN SERPL-MCNC: 44 MG/DL (ref 7–20)
CALCIUM SERPL-MCNC: 8.6 MG/DL (ref 8.3–10.6)
CHLORIDE SERPL-SCNC: 101 MMOL/L (ref 99–110)
CLARITY UR: CLEAR
CO2 SERPL-SCNC: 23 MMOL/L (ref 21–32)
COLOR UR: YELLOW
CREAT SERPL-MCNC: 1.7 MG/DL (ref 0.8–1.3)
CREAT UR-MCNC: 83.6 MG/DL (ref 39–259)
EPI CELLS #/AREA URNS AUTO: 1 /HPF (ref 0–5)
GFR SERPLBLD CREATININE-BSD FMLA CKD-EPI: 39 ML/MIN/{1.73_M2}
GLUCOSE SERPL-MCNC: 146 MG/DL (ref 70–99)
GLUCOSE UR STRIP.AUTO-MCNC: >=1000 MG/DL
HGB UR QL STRIP.AUTO: NEGATIVE
HYALINE CASTS #/AREA URNS AUTO: 1 /LPF (ref 0–8)
KETONES UR STRIP.AUTO-MCNC: NEGATIVE MG/DL
LEUKOCYTE ESTERASE UR QL STRIP.AUTO: NEGATIVE
NITRITE UR QL STRIP.AUTO: NEGATIVE
PH UR STRIP.AUTO: 5 [PH] (ref 5–8)
PHOSPHATE SERPL-MCNC: 4.2 MG/DL (ref 2.5–4.9)
POTASSIUM SERPL-SCNC: 4.3 MMOL/L (ref 3.5–5.1)
PROT UR STRIP.AUTO-MCNC: NEGATIVE MG/DL
PROT UR-MCNC: 15 MG/DL
PROT/CREAT UR-RTO: 0.2 MG/DL
RBC CLUMPS #/AREA URNS AUTO: 2 /HPF (ref 0–4)
SODIUM SERPL-SCNC: 136 MMOL/L (ref 136–145)
SP GR UR STRIP.AUTO: 1.02 (ref 1–1.03)
UA DIPSTICK W REFLEX MICRO PNL UR: ABNORMAL
URN SPEC COLLECT METH UR: ABNORMAL
UROBILINOGEN UR STRIP-ACNC: 0.2 E.U./DL
WBC #/AREA URNS AUTO: 1 /HPF (ref 0–5)

## 2023-10-13 PROCEDURE — 81001 URINALYSIS AUTO W/SCOPE: CPT

## 2023-10-13 PROCEDURE — 82306 VITAMIN D 25 HYDROXY: CPT

## 2023-10-13 PROCEDURE — 80069 RENAL FUNCTION PANEL: CPT

## 2023-10-13 PROCEDURE — 36415 COLL VENOUS BLD VENIPUNCTURE: CPT

## 2023-10-13 PROCEDURE — 82570 ASSAY OF URINE CREATININE: CPT

## 2023-10-13 PROCEDURE — 84156 ASSAY OF PROTEIN URINE: CPT

## 2023-10-16 ENCOUNTER — CARE COORDINATION (OUTPATIENT)
Dept: CASE MANAGEMENT | Age: 83
End: 2023-10-16

## 2023-10-18 ENCOUNTER — CARE COORDINATION (OUTPATIENT)
Dept: CASE MANAGEMENT | Age: 83
End: 2023-10-18

## 2023-10-18 NOTE — CARE COORDINATION
Care Transitions Follow Up Call    Patient Current Location:  Home: [address] [place] [address] [address] [facility]    Care Transition Nurse contacted the patient by telephone. Verified name and  with patient as identifiers. Patient: Maranda Torrez  Patient : 1940   MRN: [id number]  Reason for Admission: Elevated BUN, EVER, renal failure, T2DM  Discharge Date: 23 RARS: Readmission Risk Score: 16.3      Needs to be reviewed by the provider   Additional needs identified to be addressed with provider: No  none             Method of communication with provider: none. Patient reports that he is doing well, denies any concerns or questions at this time. He will follow up with PCP 10/20/23. CTN will continue with outreach follow up calls. Follow Up  Future Appointments   Date Time Provider [address]   10/20/2023  7:30 AM Mariya Felton MD [facility] - DYD   10/20/2023  4:30 PM Garrett Hernadez MD [facility]O   2023  9:30 AM Luis Alberto Back MD PULM & CC MMA   2024 11:15 AM Abou-Elsaad, Donetta Gowers, MD FF NEURO Neurology -     External follow up appointment(s):     Care Transition Nurse reviewed red flags with patient and discussed any barriers to care and/or understanding of plan of care after discharge. Discussed appropriate site of care based on symptoms and resources available to patient including: PCP  Urgent care clinics  When to call 911. The patient agrees to contact the PCP office for questions related to their healthcare. Advance Care Planning:   reviewed and current. Offered patient enrollment in the Remote Patient Monitoring (RPM) program for in-home monitoring: Will offer on follow up call  DM, HTN, kidney disease . . .      Care Transitions Subsequent and Final Call    Subsequent and Final Calls  Do you have any ongoing symptoms?: No  Have your medications changed?: No  Do you have any questions related to your medications?: No  Do you currently have

## 2023-10-20 ENCOUNTER — OFFICE VISIT (OUTPATIENT)
Dept: FAMILY MEDICINE CLINIC | Age: 83
End: 2023-10-20

## 2023-10-20 VITALS
BODY MASS INDEX: 25.94 KG/M2 | WEIGHT: 165.6 LBS | TEMPERATURE: 97.2 F | SYSTOLIC BLOOD PRESSURE: 118 MMHG | DIASTOLIC BLOOD PRESSURE: 56 MMHG | OXYGEN SATURATION: 99 % | HEART RATE: 66 BPM

## 2023-10-20 DIAGNOSIS — Z79.4 TYPE 2 DIABETES MELLITUS WITH HYPERGLYCEMIA, WITH LONG-TERM CURRENT USE OF INSULIN (HCC): Primary | ICD-10-CM

## 2023-10-20 DIAGNOSIS — I10 PRIMARY HYPERTENSION: ICD-10-CM

## 2023-10-20 DIAGNOSIS — E03.9 ACQUIRED HYPOTHYROIDISM: ICD-10-CM

## 2023-10-20 DIAGNOSIS — E11.65 TYPE 2 DIABETES MELLITUS WITH HYPERGLYCEMIA, WITH LONG-TERM CURRENT USE OF INSULIN (HCC): Primary | ICD-10-CM

## 2023-10-20 RX ORDER — METOPROLOL SUCCINATE 25 MG/1
25 TABLET, EXTENDED RELEASE ORAL DAILY
Qty: 90 TABLET | Refills: 3 | Status: SHIPPED | OUTPATIENT
Start: 2023-10-20

## 2023-10-20 RX ORDER — HYDRALAZINE HYDROCHLORIDE 25 MG/1
12.5 TABLET, FILM COATED ORAL 2 TIMES DAILY
Qty: 180 TABLET | Refills: 3 | Status: SHIPPED | OUTPATIENT
Start: 2023-10-20

## 2023-10-20 NOTE — PATIENT INSTRUCTIONS
Monitor blood pressure 2-3 times a week and drop off readings for review in 1 month. Goal BP is <140/80 for many people, good control is <130/80    Need a new cuff?  Check this website to make sure your BP cuff has been validated for accuracy:    www.validatebp.org

## 2023-10-25 ENCOUNTER — CARE COORDINATION (OUTPATIENT)
Dept: CASE MANAGEMENT | Age: 83
End: 2023-10-25

## 2023-10-25 NOTE — CARE COORDINATION
Barriers: None  Care Transitions Interventions                          Other Interventions:             Care Transition Nurse provided contact information for future needs. No further follow-up call indicated based on severity of symptoms and risk factors.   Plan for next call: symptom management-.  self management-.  follow-up appointment-.    Osvaldo Florence RN

## 2023-10-27 ENCOUNTER — HOSPITAL ENCOUNTER (OUTPATIENT)
Age: 83
Discharge: HOME | End: 2023-10-27
Payer: MEDICARE

## 2023-10-27 DIAGNOSIS — N18.32 STAGE 3B CHRONIC KIDNEY DISEASE (HCC): ICD-10-CM

## 2023-10-27 PROBLEM — Z09 HOSPITAL DISCHARGE FOLLOW-UP: Status: RESOLVED | Noted: 2023-09-27 | Resolved: 2023-10-27

## 2023-10-27 LAB
ANION GAP SERPL CALCULATED.3IONS-SCNC: 9 MMOL/L (ref 3–16)
BUN SERPL-MCNC: 65 MG/DL (ref 7–20)
CALCIUM SERPL-MCNC: 9.7 MG/DL (ref 8.3–10.6)
CHLORIDE SERPL-SCNC: 100 MMOL/L (ref 99–110)
CO2 SERPL-SCNC: 28 MMOL/L (ref 21–32)
CREAT SERPL-MCNC: 1.9 MG/DL (ref 0.8–1.3)
GFR SERPLBLD CREATININE-BSD FMLA CKD-EPI: 34 ML/MIN/{1.73_M2}
GLUCOSE SERPL-MCNC: 175 MG/DL (ref 70–99)
POTASSIUM SERPL-SCNC: 5.1 MMOL/L (ref 3.5–5.1)
SODIUM SERPL-SCNC: 137 MMOL/L (ref 136–145)

## 2023-10-27 PROCEDURE — 80048 BASIC METABOLIC PNL TOTAL CA: CPT

## 2023-10-27 PROCEDURE — 36415 COLL VENOUS BLD VENIPUNCTURE: CPT

## 2023-11-07 ENCOUNTER — OFFICE VISIT (OUTPATIENT)
Dept: PULMONOLOGY | Age: 83
End: 2023-11-07
Payer: MEDICARE

## 2023-11-07 VITALS
WEIGHT: 164.2 LBS | DIASTOLIC BLOOD PRESSURE: 56 MMHG | BODY MASS INDEX: 25.77 KG/M2 | HEIGHT: 67 IN | HEART RATE: 70 BPM | SYSTOLIC BLOOD PRESSURE: 114 MMHG | OXYGEN SATURATION: 89 %

## 2023-11-07 DIAGNOSIS — J84.9 INTERSTITIAL LUNG DISEASE (HCC): Primary | ICD-10-CM

## 2023-11-07 DIAGNOSIS — J30.1 NON-SEASONAL ALLERGIC RHINITIS DUE TO POLLEN: ICD-10-CM

## 2023-11-07 DIAGNOSIS — R05.3 CHRONIC COUGH: ICD-10-CM

## 2023-11-07 PROCEDURE — G8427 DOCREV CUR MEDS BY ELIG CLIN: HCPCS | Performed by: INTERNAL MEDICINE

## 2023-11-07 PROCEDURE — 3078F DIAST BP <80 MM HG: CPT | Performed by: INTERNAL MEDICINE

## 2023-11-07 PROCEDURE — 1123F ACP DISCUSS/DSCN MKR DOCD: CPT | Performed by: INTERNAL MEDICINE

## 2023-11-07 PROCEDURE — G8417 CALC BMI ABV UP PARAM F/U: HCPCS | Performed by: INTERNAL MEDICINE

## 2023-11-07 PROCEDURE — 1036F TOBACCO NON-USER: CPT | Performed by: INTERNAL MEDICINE

## 2023-11-07 PROCEDURE — G8484 FLU IMMUNIZE NO ADMIN: HCPCS | Performed by: INTERNAL MEDICINE

## 2023-11-07 PROCEDURE — 3074F SYST BP LT 130 MM HG: CPT | Performed by: INTERNAL MEDICINE

## 2023-11-07 PROCEDURE — 99214 OFFICE O/P EST MOD 30 MIN: CPT | Performed by: INTERNAL MEDICINE

## 2023-11-07 NOTE — PROGRESS NOTES
thickening, most likely related to fibrosis and/or interstitial lung disease. 4.  Few patchy groundglass opacities, likely infectious or inflammatory. 5.  Multiple enlarged mediastinal lymph nodes, likely reactive in the setting of diffuse pulmonary disease. Abdomen and Pelvis   1. No acute intra-abdominal or intrapelvic traumatic abnormality. 2.  Colonic diverticulosis without evidence of diverticulitis. 3.  2 mm nonobstructing calculus in the right kidney. Pulmonary Functions Testing Results:    Pulmonary Function Testing    8/4/2023     Spirometry:   Flow volume loops were normal. The FEV-1/FVC ratio was normal.  The FEV-1 was 2.0 liters (84% of predicted), which was normal.  The FVC was 2.44 liters (72% of predicted), which was decreased. Response to inhaled bronchodilators (albuterol) was not performed. Lung volumes:   Lung volumes were tested by plethysmography. The total lung  capacity was 5.5 liters (98% of predicted), which was normal. The residual volume was 3.05 liters (119% of predicted), which was increased. The ratio of residual volume to total lung capacity (RV/TLC) was 56, which was increased. Diffusion capacity was found to be 51% which is Moderately decreased. Interpretation:   Overall normal PFT study. --------------------------------------------------------------------------------------------------------------------------------------    Transthoracic echocardiogram done on 10/20/2018  Summary    Normal left ventricle size, wall thickness, and systolic function with an  estimated ejection fraction of 55-60%. No regional wall motion abnormalities  are seen. Normal diastolic function. Mild mitral regurgitation is present.      Trivial tricuspid regurgitation.     --------------------------------------------------------------------------------------------------------------------------------------  CCP: 0.6  JUAN positive  Rheumatoid factor less than 10  Antinuclear

## 2023-11-15 ENCOUNTER — HOSPITAL ENCOUNTER (OUTPATIENT)
Age: 83
Discharge: HOME | End: 2023-11-15
Payer: MEDICARE

## 2023-11-15 DIAGNOSIS — N18.32 STAGE 3B CHRONIC KIDNEY DISEASE (HCC): ICD-10-CM

## 2023-11-15 LAB
ALBUMIN SERPL-MCNC: 4.1 G/DL (ref 3.4–5)
ANION GAP SERPL CALCULATED.3IONS-SCNC: 11 MMOL/L (ref 3–16)
BUN SERPL-MCNC: 77 MG/DL (ref 7–20)
CALCIUM SERPL-MCNC: 9 MG/DL (ref 8.3–10.6)
CHLORIDE SERPL-SCNC: 98 MMOL/L (ref 99–110)
CO2 SERPL-SCNC: 24 MMOL/L (ref 21–32)
CREAT SERPL-MCNC: 1.9 MG/DL (ref 0.8–1.3)
GFR SERPLBLD CREATININE-BSD FMLA CKD-EPI: 34 ML/MIN/{1.73_M2}
GLUCOSE SERPL-MCNC: 445 MG/DL (ref 70–99)
PHOSPHATE SERPL-MCNC: 5.5 MG/DL (ref 2.5–4.9)
POTASSIUM SERPL-SCNC: 4.9 MMOL/L (ref 3.5–5.1)
SODIUM SERPL-SCNC: 133 MMOL/L (ref 136–145)

## 2023-11-15 PROCEDURE — 36415 COLL VENOUS BLD VENIPUNCTURE: CPT

## 2023-11-15 PROCEDURE — 80069 RENAL FUNCTION PANEL: CPT

## 2023-11-28 DIAGNOSIS — E03.9 HYPOTHYROIDISM, UNSPECIFIED TYPE: ICD-10-CM

## 2023-11-28 RX ORDER — CELECOXIB 100 MG/1
100 CAPSULE ORAL DAILY
Qty: 90 CAPSULE | Refills: 0 | OUTPATIENT
Start: 2023-11-28

## 2023-11-28 RX ORDER — IRBESARTAN 300 MG/1
300 TABLET ORAL DAILY
Qty: 90 TABLET | Refills: 1 | OUTPATIENT
Start: 2023-11-28

## 2023-11-28 RX ORDER — LEVOTHYROXINE SODIUM 0.03 MG/1
25 TABLET ORAL DAILY
Qty: 90 TABLET | Refills: 1 | Status: SHIPPED | OUTPATIENT
Start: 2023-11-28

## 2023-11-28 NOTE — TELEPHONE ENCOUNTER
Medication:   Requested Prescriptions     Pending Prescriptions Disp Refills    levothyroxine (SYNTHROID) 25 MCG tablet [Pharmacy Med Name: LEVOTHYROXINE 25 MCG TABLET] 90 tablet 1     Sig: TAKE 1 TABLET BY MOUTH DAILY          Patient Phone Number: [phone] (home)     Last appt: 10/20/2023   Next appt: 12/19/2023    Last OARRS:        No data to display              PDMP Monitoring:    Last PDMP Selestino Eye as Reviewed [facility]):  Review User Review Instant Review Result          Preferred Pharmacy:   Nelli Goins [id number] [address] [phone] Fabio Constantino [phone]  [address] [place] [address]  [address]  Phone: [phone] Fax: [phone]

## 2023-11-28 NOTE — TELEPHONE ENCOUNTER
Medication:   Requested Prescriptions     Pending Prescriptions Disp Refills    irbesartan (AVAPRO) 300 MG tablet 90 tablet 1     Sig: Take 1 tablet by mouth daily      This wasn't on patient's medication list, had to pull from history, unsure if still taking. Please advise.     Patient Phone Number: [phone] (home)     Last appt: 10/20/2023   Next appt: 12/19/2023    Last OARRS:        No data to display              PDMP Monitoring:    Last PDMP Janine Ruelas as Reviewed [facility]):  Review User Review Instant Review Result          Preferred Pharmacy:   [facility] [id number] Tyrone Michael[address] [phone] Oj Chappell [phone]   [address]  [address]  Phone: [phone] Fax: [phone]

## 2023-12-07 RX ORDER — EMPAGLIFLOZIN 10 MG/1
10 TABLET, FILM COATED ORAL DAILY
Qty: 30 TABLET | Refills: 0 | Status: SHIPPED | OUTPATIENT
Start: 2023-12-07

## 2023-12-19 PROBLEM — N18.32 STAGE 3B CHRONIC KIDNEY DISEASE (HCC): Status: RESOLVED | Noted: 2023-09-27 | Resolved: 2023-12-19

## 2023-12-19 PROBLEM — L85.3 XEROSIS CUTIS: Status: ACTIVE | Noted: 2023-12-19

## 2023-12-19 PROBLEM — N17.9 AKI (ACUTE KIDNEY INJURY) (HCC): Status: RESOLVED | Noted: 2023-09-20 | Resolved: 2023-12-19

## 2024-01-04 RX ORDER — EMPAGLIFLOZIN 10 MG/1
10 TABLET, FILM COATED ORAL DAILY
Qty: 30 TABLET | Refills: 0 | Status: SHIPPED | OUTPATIENT
Start: 2024-01-04

## 2024-01-11 NOTE — PROGRESS NOTES
[facility]   Cardiac Follow up    Referring Provider:  Madison Tapia MD     Chief Complaint   Patient presents with    Coronary Artery Disease     History of Present Illness:  Mr. Schwartz is a 83 y.o. male who has a history of CAD, hypertension, hyperlipidemia and shoulder surgery. Prior to stenting in December 2013, he was short of breath while snow blowing. Previously suffered from rhabdomyolysis on Lipitor (had taken for 15 years prior without problem). Last stress test was stable. He is a retired [employer] . He is a non smoker.  His dermatologist is Dr Valdez from [facility]. He has help from his 2 daughters who live close and he lives with his dog.     He presented to the emergency department with abnormal labs on 7/10/23.  The patient was sent in by primary care.  Reported that he had labs completed on 7/5/2023 which were abnormal and then repeated 7/9/23, again abnormal.  The patient reports that his kidney function and potassium were abnormal. Patient was hospitalized at [facility] with head injury 6/2023 and was started on Lasix for concern of congestive heart failure.  The patient has no history of congestive heart failure.  He was diuresed and discharged home.  He did continue to have bilateral leg swelling and some rhonchi noted on respiratory exam, the patient reported that he is not short of breath.  He had gained 2 pounds over the past 2 weeks. Nephrology was called and scheduled outpatient follow up.  Patient wanted to be discharged home. Admission offered for ECHO and cardiology referral to diagnose CHF if appropriate as the patient had not had an ECHO.  Patient wanted to be discharged home.  Labs were recollected in the emergency department 7/10/23 > CBC  unremarkable.  CMP showed a BUN of 64 with a creatinine of 1.8 and GFR of 37.  The patient's potassium was normal.  Patient discharged in good condition 7/10/23 from ER.    Pt established with nephrology

## 2024-01-14 DIAGNOSIS — E11.42 DM TYPE 2 WITH DIABETIC PERIPHERAL NEUROPATHY (HCC): ICD-10-CM

## 2024-01-15 RX ORDER — BLOOD-GLUCOSE METER
KIT MISCELLANEOUS
Qty: 100 STRIP | Refills: 3 | Status: SHIPPED | OUTPATIENT
Start: 2024-01-15

## 2024-01-15 NOTE — TELEPHONE ENCOUNTER
Medication:   Requested Prescriptions     Pending Prescriptions Disp Refills    FREESTYLE LITE strip [Pharmacy Med Name: FREESTYLE LITE TEST STRIP] 100 strip 3     Sig: USE TO TEST BLOOD SUGAR THREE TIMES A DAY          Patient Phone Number: [phone] (home)     Last appt: 12/19/2023   Next appt: 3/19/2024    Last OARRS:        No data to display              PDMP Monitoring:    Last PDMP Guzman as Reviewed ([place]):  Review User Review Instant Review Result          Preferred Pharmacy:   [facility] [id number] - [place], [place] - [phone]  [address] [place] [address]  Phone: [phone] Fax: [phone]

## 2024-01-16 ENCOUNTER — HOSPITAL ENCOUNTER (OUTPATIENT)
Age: 84
Discharge: HOME | End: 2024-01-16
Payer: MEDICARE

## 2024-01-16 DIAGNOSIS — N18.32 STAGE 3B CHRONIC KIDNEY DISEASE (HCC): ICD-10-CM

## 2024-01-16 LAB
ALBUMIN SERPL-MCNC: 4 G/DL (ref 3.4–5)
ANION GAP SERPL CALCULATED.3IONS-SCNC: 13 MMOL/L (ref 3–16)
BUN SERPL-MCNC: 41 MG/DL (ref 7–20)
CALCIUM SERPL-MCNC: 9 MG/DL (ref 8.3–10.6)
CHLORIDE SERPL-SCNC: 96 MMOL/L (ref 99–110)
CO2 SERPL-SCNC: 27 MMOL/L (ref 21–32)
CREAT SERPL-MCNC: 1.8 MG/DL (ref 0.8–1.3)
CREAT UR-MCNC: 40.9 MG/DL (ref 39–259)
DEPRECATED RDW RBC AUTO: 14.7 % (ref 12.4–15.4)
EST. AVERAGE GLUCOSE BLD GHB EST-MCNC: 326.4 MG/DL
GFR SERPLBLD CREATININE-BSD FMLA CKD-EPI: 37 ML/MIN/{1.73_M2}
GLUCOSE SERPL-MCNC: 296 MG/DL (ref 70–99)
HBA1C MFR BLD: 13 %
HCT VFR BLD AUTO: 43.5 % (ref 40.5–52.5)
HGB BLD-MCNC: 14.5 G/DL (ref 13.5–17.5)
MCH RBC QN AUTO: 29.6 PG (ref 26–34)
MCHC RBC AUTO-ENTMCNC: 33.3 G/DL (ref 31–36)
MCV RBC AUTO: 89 FL (ref 80–100)
PHOSPHATE SERPL-MCNC: 3.9 MG/DL (ref 2.5–4.9)
PLATELET # BLD AUTO: 239 K/UL (ref 135–450)
PMV BLD AUTO: 8.3 FL (ref 5–10.5)
POTASSIUM SERPL-SCNC: 4.7 MMOL/L (ref 3.5–5.1)
PROT UR-MCNC: 13 MG/DL
PROT/CREAT UR-RTO: 0.3 MG/DL
RBC # BLD AUTO: 4.89 M/UL (ref 4.2–5.9)
SODIUM SERPL-SCNC: 136 MMOL/L (ref 136–145)
T4 FREE SERPL-MCNC: 1.2 NG/DL (ref 0.9–1.8)
TSH SERPL DL<=0.005 MIU/L-ACNC: 7.11 UIU/ML (ref 0.27–4.2)
WBC # BLD AUTO: 7.8 K/UL (ref 4–11)

## 2024-01-16 PROCEDURE — 80069 RENAL FUNCTION PANEL: CPT

## 2024-01-16 PROCEDURE — 85027 COMPLETE CBC AUTOMATED: CPT

## 2024-01-16 PROCEDURE — 82570 ASSAY OF URINE CREATININE: CPT

## 2024-01-16 PROCEDURE — 83036 HEMOGLOBIN GLYCOSYLATED A1C: CPT

## 2024-01-16 PROCEDURE — 84156 ASSAY OF PROTEIN URINE: CPT

## 2024-01-16 PROCEDURE — 84443 ASSAY THYROID STIM HORMONE: CPT

## 2024-01-16 PROCEDURE — 36415 COLL VENOUS BLD VENIPUNCTURE: CPT

## 2024-01-16 PROCEDURE — 84439 ASSAY OF FREE THYROXINE: CPT

## 2024-01-22 ENCOUNTER — OFFICE VISIT (OUTPATIENT)
Dept: FAMILY MEDICINE CLINIC | Age: 84
End: 2024-01-22

## 2024-01-22 ENCOUNTER — TELEPHONE (OUTPATIENT)
Dept: FAMILY MEDICINE CLINIC | Age: 84
End: 2024-01-22

## 2024-01-22 VITALS
BODY MASS INDEX: 25.78 KG/M2 | DIASTOLIC BLOOD PRESSURE: 54 MMHG | SYSTOLIC BLOOD PRESSURE: 110 MMHG | OXYGEN SATURATION: 97 % | TEMPERATURE: 98 F | WEIGHT: 164.6 LBS | HEART RATE: 72 BPM

## 2024-01-22 DIAGNOSIS — I10 PRIMARY HYPERTENSION: ICD-10-CM

## 2024-01-22 DIAGNOSIS — E03.9 ACQUIRED HYPOTHYROIDISM: ICD-10-CM

## 2024-01-22 DIAGNOSIS — E11.65 TYPE 2 DIABETES MELLITUS WITH HYPERGLYCEMIA, WITH LONG-TERM CURRENT USE OF INSULIN (HCC): Primary | ICD-10-CM

## 2024-01-22 DIAGNOSIS — G20.A1 PARKINSON'S DISEASE, UNSPECIFIED WHETHER DYSKINESIA PRESENT, UNSPECIFIED WHETHER MANIFESTATIONS FLUCTUATE: ICD-10-CM

## 2024-01-22 DIAGNOSIS — Z79.4 TYPE 2 DIABETES MELLITUS WITH HYPERGLYCEMIA, WITH LONG-TERM CURRENT USE OF INSULIN (HCC): Primary | ICD-10-CM

## 2024-01-22 DIAGNOSIS — I87.333 CHRONIC VENOUS HYPERTENSION WITH ULCER AND INFLAMMATION INVOLVING BOTH SIDES (HCC): ICD-10-CM

## 2024-01-22 DIAGNOSIS — J84.9 INTERSTITIAL LUNG DISEASE (HCC): ICD-10-CM

## 2024-01-22 RX ORDER — INSULIN GLARGINE 100 [IU]/ML
30 INJECTION, SOLUTION SUBCUTANEOUS NIGHTLY
Qty: 5 ADJUSTABLE DOSE PRE-FILLED PEN SYRINGE | Refills: 3 | Status: SHIPPED | OUTPATIENT
Start: 2024-01-22

## 2024-01-22 RX ORDER — LEVOTHYROXINE SODIUM 0.05 MG/1
50 TABLET ORAL DAILY
Qty: 90 TABLET | Refills: 3 | Status: SHIPPED | OUTPATIENT
Start: 2024-01-22

## 2024-01-22 RX ORDER — FUROSEMIDE 20 MG/1
TABLET ORAL
Qty: 60 TABLET | Refills: 5 | Status: SHIPPED
Start: 2024-01-22

## 2024-01-22 RX ORDER — METOPROLOL SUCCINATE 50 MG/1
50 TABLET, EXTENDED RELEASE ORAL DAILY
Qty: 90 TABLET | Refills: 3 | Status: SHIPPED | OUTPATIENT
Start: 2024-01-22

## 2024-01-22 RX ORDER — HYDRALAZINE HYDROCHLORIDE 10 MG/1
10 TABLET, FILM COATED ORAL 2 TIMES DAILY
Qty: 60 TABLET | Refills: 5 | Status: CANCELLED | OUTPATIENT
Start: 2024-01-22

## 2024-01-22 NOTE — PROGRESS NOTES
Patient is here to discuss last blood work results.  Sugar and Thyroid was off.        Legs are still red, was told by kidney doctor that we need to see if something else can be done.  Daughter noticed a sore on leg now.        Alan Schwartz is a 83 y.o. male who presents for follow-up of Type 2 diabetes mellitus.     Current medication use: taking as prescribed and adverse effects: none (basal insulin and Jardiance)  Checks sugars at home:  1x/day in AM  Hypoglycemia symptoms: No  AM Fasting: 160-300      Thyroid - Taking meds regularly, daughter sets up in pill box.      Daughter wants to discuss changing patient's Hydralazine.  Having trouble splitting in half due to size and the way the medication is made.  BP has also been a little low so wondering if still needs med.         Body mass index is 25.78 kg/m².  Vitals:    01/22/24 1132   BP: (!) 110/54   Site: Left Upper Arm   Position: Sitting   Cuff Size: Medium Adult   Pulse: 72   Temp: 98 °F (36.7 °C)   TempSrc: Infrared   SpO2: 97%   Weight: 74.7 kg (164 lb 9.6 oz)     Wt Readings from Last 3 Encounters:   01/22/24 74.7 kg (164 lb 9.6 oz)   01/19/24 74.8 kg (165 lb)   12/19/23 73 kg (161 lb)         No data recorded    Interpretation of Total Score Depression Severity: 1-4 = Minimal depression, 5-9 = Mild depression, 10-14 = Moderate depression, 15-19 = Moderately severe depression, 20-27 = Severe depression     GENERAL:Alert and oriented x 4 NAD, affect appropriate and overweight, well hydrated, well developed.  LUNG:clear to auscultation bilaterally with normal respiratory effort  CV: Normal heart sounds, regular rate and rhythm without murmurs  EXTREMETY: 2+ edema, patches of erythema with some scaling, open areas bilaterally              ASSESSMENT AND PLAN:       Alan was seen today for abnormal test results.    Diagnoses and all orders for this visit:    Type 2 diabetes mellitus with hyperglycemia, with long-term current use of insulin (HCC)  -

## 2024-01-22 NOTE — PATIENT INSTRUCTIONS
--Check sugars twice daily and send me readings in a week    --Increase lantus to 30 units nightly    --Send me name of dermatologist

## 2024-01-22 NOTE — TELEPHONE ENCOUNTER
FREDO Quinn Pt came in today for a check up and wanted to inform Dr. Tapia of his dermatologist [facility] - [phone]    Please advise...

## 2024-01-25 NOTE — PATIENT INSTRUCTIONS
[facility]  [address]   [place], [place] [address]  Telephone: [phone]     FAX [phone]    Discharge Instructions    Important reminders:    **If you have any signs and symptoms of illness (Cough, fever, congestion, nausea, vomiting, diarrhea, etc.) please call the wound care center prior to your appointment.    1. Increase Protein intake for optimal wound healing  2. No added salt to reduce any swelling  3. If diabetic, maintain good glucose control  4. If you smoke, smoking prohibits wound healing, we ask that you refrain from smoking.  5. When taking antibiotics take the entire prescription as ordered. Do not stop taking until medication is all gone unless otherwise instructed.   6. Exercise as tolerated.   7. Keep weight off wounds and reposition every 2 hours if applicable.  8. If wound(s) is on your lower extremity, elevate legs to the level of the heart or above for 30 minutes 4-5 times a day and/or when sitting. Avoid standing for long periods of time.   9. Do not get wounds wet in bath or shower unless otherwise instructed by your physician. If your wound is on your foot or leg, you may purchase a cast bag. Please ask at the pharmacy.      If Vascular testing is ordered, please call [address] [place] [address] ([phone]) to schedule.    Vascular tests ordered by Wound Care Physicians may take up to 2 hours to complete. Please keep that in mind when scheduling.     If Vascular testing is scheduled, please bring supplies to replace your dressing after testing is done. The vascular department does not stock supplies.     Wound:      With each dressing change, rinse wounds with 0.9% Saline. (May use wound wash or soft contact solution. Both can be purchased at a local drug store). If unable to obtain saline, may use a gentle soap and water.    Dressing care:     Home Care Agency/Facility:     Your wound-care supplies will be provided by:   Please note, depending on your insurance coverage, you

## 2024-01-26 ENCOUNTER — HOSPITAL ENCOUNTER (OUTPATIENT)
Dept: WOUND CARE | Age: 84
Discharge: HOME | End: 2024-01-26
Attending: PODIATRIST

## 2024-01-28 ENCOUNTER — PATIENT MESSAGE (OUTPATIENT)
Dept: FAMILY MEDICINE CLINIC | Age: 84
End: 2024-01-28

## 2024-01-29 NOTE — TELEPHONE ENCOUNTER
From: Alan Schwartz  To: Dr. Madison Tapia  Sent: 1/28/2024 6:22 PM EST  Subject: Glucose numbers for the last week    Please see attached photo for numbers

## 2024-01-31 ENCOUNTER — OFFICE VISIT (OUTPATIENT)
Dept: CARDIOLOGY CLINIC | Age: 84
End: 2024-01-31
Payer: MEDICARE

## 2024-01-31 VITALS
BODY MASS INDEX: 24.44 KG/M2 | HEIGHT: 69 IN | OXYGEN SATURATION: 94 % | DIASTOLIC BLOOD PRESSURE: 58 MMHG | HEART RATE: 71 BPM | SYSTOLIC BLOOD PRESSURE: 118 MMHG | WEIGHT: 165 LBS

## 2024-01-31 DIAGNOSIS — I10 PRIMARY HYPERTENSION: ICD-10-CM

## 2024-01-31 DIAGNOSIS — I25.10 CORONARY ARTERY DISEASE INVOLVING NATIVE CORONARY ARTERY OF NATIVE HEART WITHOUT ANGINA PECTORIS: Primary | ICD-10-CM

## 2024-01-31 DIAGNOSIS — E78.49 OTHER HYPERLIPIDEMIA: ICD-10-CM

## 2024-01-31 DIAGNOSIS — E11.9 TYPE 2 DIABETES MELLITUS WITHOUT COMPLICATION, WITHOUT LONG-TERM CURRENT USE OF INSULIN (HCC): ICD-10-CM

## 2024-01-31 DIAGNOSIS — E11.42 DIABETIC PERIPHERAL NEUROPATHY (HCC): ICD-10-CM

## 2024-01-31 PROCEDURE — 1123F ACP DISCUSS/DSCN MKR DOCD: CPT | Performed by: INTERNAL MEDICINE

## 2024-01-31 PROCEDURE — G8427 DOCREV CUR MEDS BY ELIG CLIN: HCPCS | Performed by: INTERNAL MEDICINE

## 2024-01-31 PROCEDURE — 3078F DIAST BP <80 MM HG: CPT | Performed by: INTERNAL MEDICINE

## 2024-01-31 PROCEDURE — 1036F TOBACCO NON-USER: CPT | Performed by: INTERNAL MEDICINE

## 2024-01-31 PROCEDURE — 3074F SYST BP LT 130 MM HG: CPT | Performed by: INTERNAL MEDICINE

## 2024-01-31 PROCEDURE — 99214 OFFICE O/P EST MOD 30 MIN: CPT | Performed by: INTERNAL MEDICINE

## 2024-01-31 PROCEDURE — 3046F HEMOGLOBIN A1C LEVEL >9.0%: CPT | Performed by: INTERNAL MEDICINE

## 2024-01-31 PROCEDURE — G8420 CALC BMI NORM PARAMETERS: HCPCS | Performed by: INTERNAL MEDICINE

## 2024-01-31 PROCEDURE — G8484 FLU IMMUNIZE NO ADMIN: HCPCS | Performed by: INTERNAL MEDICINE

## 2024-01-31 RX ORDER — INSULIN GLARGINE 100 [IU]/ML
35 INJECTION, SOLUTION SUBCUTANEOUS NIGHTLY
Qty: 5 ADJUSTABLE DOSE PRE-FILLED PEN SYRINGE | Refills: 3 | Status: SHIPPED
Start: 2024-01-31

## 2024-01-31 NOTE — PATIENT INSTRUCTIONS
No medication changes  Continue risk factor modifications.   Call for any change in symptoms, call to report any changes in shortness of breath or development of chest pain with activity.    Follow up in 12 mos

## 2024-02-01 NOTE — PATIENT INSTRUCTIONS
week - Betadine ointment and dry dressing. Change daily  Leg wounds - Cutimed pads and ABD. Coflex calamines  These wraps will be changed at your next visit. If you have any complications with the wraps( if they become wet, start to slide down, if the wraps become too tight) please call the office so that we can give you further direction      Watch diet. No potatoes, Rice, Bread Cereal or Pasta. Watch all carbs. Limit to 15g carbs per meal or 45g carbs per day. Limit fruit to blackberries, blueberries and raspberries. High protein. Vegetables are good.    Compression Wraps  Location: Both legs  Type: Coflex calamine    Your doctor has ordered compression therapy for your wound. Compression bandages reduce the swelling, or edema, in your legs and prevent it from returning. The wound care staff will apply your compression wrap. It must be removed and re-applied at least weekly. As the swelling decreases, the boot no longer provides adequate compression and you need a new one. Once applied, you need to know how to take care of your compression wrap.     The boot must stay dry. Do not get it wet in the shower or tub. You may do a partial bath, or you can cover the boot with a large plastic bag, secured at the top, so that no water can get in.    Avoid standing in one place for long periods of time. If you must  one place, shift your weight and change positions often.    If you have CHF, consult your doctor before following the next two recommendations for leg elevation.     When sitting, elevate your legs on pillows, or put blocks under the foot of your bed. Your legs should be higher than your heart.    If your boot becomes painful, or you notice an increase in swelling in your toes, numbness or tingling, or purple color to your toes, remove the wrap and call the Wound Care Center. If it is after hours, call your doctor for instructions or go to the nearest emergency room.      Home Care Agency/Facility:

## 2024-02-02 ENCOUNTER — HOSPITAL ENCOUNTER (OUTPATIENT)
Dept: WOUND CARE | Age: 84
Discharge: HOME | End: 2024-02-02
Attending: PODIATRIST
Payer: MEDICARE

## 2024-02-02 VITALS
RESPIRATION RATE: 16 BRPM | WEIGHT: 167.5 LBS | TEMPERATURE: 97.5 F | HEART RATE: 66 BPM | DIASTOLIC BLOOD PRESSURE: 67 MMHG | BODY MASS INDEX: 24.74 KG/M2 | SYSTOLIC BLOOD PRESSURE: 115 MMHG

## 2024-02-02 DIAGNOSIS — L97.222 NON-PRESSURE CHRONIC ULCER OF LEFT CALF WITH FAT LAYER EXPOSED (HCC): ICD-10-CM

## 2024-02-02 DIAGNOSIS — L98.499 DIABETES MELLITUS WITH SKIN ULCER (HCC): ICD-10-CM

## 2024-02-02 DIAGNOSIS — E11.65 UNCONTROLLED DIABETES MELLITUS WITH HYPERGLYCEMIA, WITH LONG-TERM CURRENT USE OF INSULIN (HCC): Primary | ICD-10-CM

## 2024-02-02 DIAGNOSIS — L97.512 RIGHT FOOT ULCER, WITH FAT LAYER EXPOSED (HCC): ICD-10-CM

## 2024-02-02 DIAGNOSIS — E11.622 DIABETES MELLITUS WITH SKIN ULCER (HCC): ICD-10-CM

## 2024-02-02 DIAGNOSIS — Z79.4 UNCONTROLLED DIABETES MELLITUS WITH HYPERGLYCEMIA, WITH LONG-TERM CURRENT USE OF INSULIN (HCC): Primary | ICD-10-CM

## 2024-02-02 DIAGNOSIS — L97.211 NON-PRESSURE CHRONIC ULCER OF RIGHT CALF, LIMITED TO BREAKDOWN OF SKIN (HCC): ICD-10-CM

## 2024-02-02 PROCEDURE — 29581 APPL MULTLAYER CMPRN SYS LEG: CPT

## 2024-02-02 PROCEDURE — 99213 OFFICE O/P EST LOW 20 MIN: CPT

## 2024-02-02 PROCEDURE — 97597 DBRDMT OPN WND 1ST 20 CM/<: CPT

## 2024-02-02 PROCEDURE — 11042 DBRDMT SUBQ TIS 1ST 20SQCM/<: CPT

## 2024-02-02 RX ORDER — GENTAMICIN SULFATE 1 MG/G
OINTMENT TOPICAL ONCE
OUTPATIENT
Start: 2024-02-02 | End: 2024-02-02

## 2024-02-02 RX ORDER — GINSENG 100 MG
CAPSULE ORAL ONCE
OUTPATIENT
Start: 2024-02-02 | End: 2024-02-02

## 2024-02-02 RX ORDER — LIDOCAINE HYDROCHLORIDE 20 MG/ML
JELLY TOPICAL ONCE
OUTPATIENT
Start: 2024-02-02 | End: 2024-02-02

## 2024-02-02 RX ORDER — SODIUM CHLOR/HYPOCHLOROUS ACID 0.033 %
SOLUTION, IRRIGATION IRRIGATION ONCE
OUTPATIENT
Start: 2024-02-02 | End: 2024-02-02

## 2024-02-02 RX ORDER — LIDOCAINE 40 MG/G
CREAM TOPICAL ONCE
OUTPATIENT
Start: 2024-02-02 | End: 2024-02-02

## 2024-02-02 RX ORDER — IBUPROFEN 200 MG
TABLET ORAL ONCE
OUTPATIENT
Start: 2024-02-02 | End: 2024-02-02

## 2024-02-02 RX ORDER — CLOBETASOL PROPIONATE 0.5 MG/G
OINTMENT TOPICAL ONCE
OUTPATIENT
Start: 2024-02-02 | End: 2024-02-02

## 2024-02-02 RX ORDER — BETAMETHASONE DIPROPIONATE 0.5 MG/G
CREAM TOPICAL ONCE
OUTPATIENT
Start: 2024-02-02 | End: 2024-02-02

## 2024-02-02 RX ORDER — TRIAMCINOLONE ACETONIDE 1 MG/G
OINTMENT TOPICAL ONCE
OUTPATIENT
Start: 2024-02-02 | End: 2024-02-02

## 2024-02-02 RX ORDER — LIDOCAINE 50 MG/G
OINTMENT TOPICAL ONCE
OUTPATIENT
Start: 2024-02-02 | End: 2024-02-02

## 2024-02-02 RX ORDER — BACITRACIN ZINC AND POLYMYXIN B SULFATE 500; 1000 [USP'U]/G; [USP'U]/G
OINTMENT TOPICAL ONCE
OUTPATIENT
Start: 2024-02-02 | End: 2024-02-02

## 2024-02-02 RX ORDER — LIDOCAINE HYDROCHLORIDE 40 MG/ML
SOLUTION TOPICAL ONCE
OUTPATIENT
Start: 2024-02-02 | End: 2024-02-02

## 2024-02-02 NOTE — PROGRESS NOTES
Multilayer Compression Wrap   Below the Knee    NAME:  Alan Schwartz  YOB: 1940  MEDICAL RECORD NUMBER:  [id number]  DATE:  2/2/2024    Multilayer compression wrap: Applied primary and secondary dressing as ordered.  Applied multilayered dressing below the knee to right lower leg.  Applied multilayered dressing below the knee to left lower leg.  Instructed patient/caregiver not to remove dressing and to keep it clean and dry.   Instructed patient/caregiver on complications to report to provider, such as pain, numbness in toes, heavy drainage, and slippage of dressing.  Instructed patient on purpose of compression dressing and on activity and exercise recommendations.     Applied  2 layer wrap from toes to knee overlapping each time    Electronically signed by SON GUIDO RN on 2/2/2024 at 9:58 AM

## 2024-02-02 NOTE — PLAN OF CARE
Discharge instructions given.  Patient verbalized understanding.  Return to [facility] in 1 week(s).  Called/faxed orders to  [facility]      
skin sensitive tape 4\"  [] Saline  [] Skin Prep   [] Adhesive Remover   [x] Cotton Tip Applicators  [] Tubular Stocking   [] Size E  [] Size G  [] Other:    Patient currently being seen by Home Health: [] Yes   [x] No    Quantity of days dispensed:  []15  [x]30  []60  []90 Days    Order valid for 90 days    OK to forward order to an in-network provider [x] Yes   [] No    Provider Information:      PROVIDER'S NAME/NPI  Amanuel Roblero DPM  [id number]    I give permission to coordinate the care for this patient

## 2024-02-02 NOTE — PROGRESS NOTES
[facility]  Progress Note and Procedure Note      Alan Schwartz  AGE: 83 y.o.   GENDER: male  : 1940  TODAY'S DATE:  2024    Subjective:     Chief Complaint   Patient presents with    Wound Check     Right and left lower extremities         HISTORY of PRESENT ILLNESS HPI     Alan Schwartz is a 83 y.o. male who presents today for wound evaluation.   History of Wound: Patient presents today with wounds on multiple locations including bilateral legs and right foot.  Admits to be in an uncontrolled diabetic.  He states that he try to get that under control.  He states that his swelling and blood glucose control have been worse since his wife .  He notes that he has been sleeping in a recliner and often sits in a recliner during the day with the legs down.  He denies current nausea, vomiting, fever, chills, shortness of breath or chest pain.He admits to neuropathy bilaterally  Wound Pain:  none  Severity:  0 / 10   Wound Type:  venous, arterial, and diabetic  Modifying Factors:  edema, venous stasis, lymphedema, diabetes, poor glucose control, chronic pressure, decreased mobility, shear force, arterial insufficiency, decreased tissue oxygenation, and non-adherence  Associated Signs/Symptoms:  edema, drainage, and numbness        PAST MEDICAL HISTORY        Diagnosis Date    Arthropathy, unspecified, site unspecified     Hypertension     Hypertrophy of prostate without urinary obstruction and other lower urinary tract symptoms (LUTS)     Impotence of organic origin     Inguinal hernia     Myalgia and myositis, unspecified     Other and unspecified hyperlipidemia     Retinal microaneurysms NOS     Skin cancer     Type II or unspecified type diabetes mellitus without mention of complication, not stated as uncontrolled        PAST SURGICAL HISTORY    Past Surgical History:   Procedure Laterality Date    CATARACT REMOVAL Bilateral     CORONARY ANGIOPLASTY WITH STENT PLACEMENT

## 2024-02-05 ENCOUNTER — HOSPITAL ENCOUNTER (OUTPATIENT)
Dept: WOUND CARE | Age: 84
Discharge: HOME | End: 2024-02-05
Attending: PODIATRIST
Payer: MEDICARE

## 2024-02-05 DIAGNOSIS — L97.222 NON-PRESSURE CHRONIC ULCER OF LEFT CALF WITH FAT LAYER EXPOSED (HCC): ICD-10-CM

## 2024-02-05 DIAGNOSIS — L98.499 DIABETES MELLITUS WITH SKIN ULCER (HCC): Primary | ICD-10-CM

## 2024-02-05 DIAGNOSIS — E11.65 UNCONTROLLED DIABETES MELLITUS WITH HYPERGLYCEMIA, WITH LONG-TERM CURRENT USE OF INSULIN (HCC): ICD-10-CM

## 2024-02-05 DIAGNOSIS — L97.512 RIGHT FOOT ULCER, WITH FAT LAYER EXPOSED (HCC): ICD-10-CM

## 2024-02-05 DIAGNOSIS — E11.622 DIABETES MELLITUS WITH SKIN ULCER (HCC): Primary | ICD-10-CM

## 2024-02-05 DIAGNOSIS — Z79.4 UNCONTROLLED DIABETES MELLITUS WITH HYPERGLYCEMIA, WITH LONG-TERM CURRENT USE OF INSULIN (HCC): ICD-10-CM

## 2024-02-05 DIAGNOSIS — L97.211 NON-PRESSURE CHRONIC ULCER OF RIGHT CALF, LIMITED TO BREAKDOWN OF SKIN (HCC): ICD-10-CM

## 2024-02-05 PROCEDURE — 29581 APPL MULTLAYER CMPRN SYS LEG: CPT

## 2024-02-05 RX ORDER — LIDOCAINE 50 MG/G
OINTMENT TOPICAL ONCE
OUTPATIENT
Start: 2024-02-05 | End: 2024-02-05

## 2024-02-05 RX ORDER — LIDOCAINE 40 MG/G
CREAM TOPICAL ONCE
Status: CANCELLED | OUTPATIENT
Start: 2024-02-05 | End: 2024-02-05

## 2024-02-05 RX ORDER — LIDOCAINE HYDROCHLORIDE 40 MG/ML
SOLUTION TOPICAL ONCE
OUTPATIENT
Start: 2024-02-05 | End: 2024-02-05

## 2024-02-05 RX ORDER — SODIUM CHLOR/HYPOCHLOROUS ACID 0.033 %
SOLUTION, IRRIGATION IRRIGATION ONCE
OUTPATIENT
Start: 2024-02-05 | End: 2024-02-05

## 2024-02-05 RX ORDER — CLOBETASOL PROPIONATE 0.5 MG/G
OINTMENT TOPICAL ONCE
OUTPATIENT
Start: 2024-02-05 | End: 2024-02-05

## 2024-02-05 RX ORDER — GINSENG 100 MG
CAPSULE ORAL ONCE
OUTPATIENT
Start: 2024-02-05 | End: 2024-02-05

## 2024-02-05 RX ORDER — LIDOCAINE HYDROCHLORIDE 20 MG/ML
JELLY TOPICAL ONCE
OUTPATIENT
Start: 2024-02-05 | End: 2024-02-05

## 2024-02-05 RX ORDER — BETAMETHASONE DIPROPIONATE 0.5 MG/G
CREAM TOPICAL ONCE
OUTPATIENT
Start: 2024-02-05 | End: 2024-02-05

## 2024-02-05 RX ORDER — IBUPROFEN 200 MG
TABLET ORAL ONCE
OUTPATIENT
Start: 2024-02-05 | End: 2024-02-05

## 2024-02-05 RX ORDER — BACITRACIN ZINC AND POLYMYXIN B SULFATE 500; 1000 [USP'U]/G; [USP'U]/G
OINTMENT TOPICAL ONCE
OUTPATIENT
Start: 2024-02-05 | End: 2024-02-05

## 2024-02-05 RX ORDER — TRIAMCINOLONE ACETONIDE 1 MG/G
OINTMENT TOPICAL ONCE
OUTPATIENT
Start: 2024-02-05 | End: 2024-02-05

## 2024-02-05 RX ORDER — GENTAMICIN SULFATE 1 MG/G
OINTMENT TOPICAL ONCE
OUTPATIENT
Start: 2024-02-05 | End: 2024-02-05

## 2024-02-05 RX ORDER — LIDOCAINE 40 MG/G
CREAM TOPICAL ONCE
Status: DISCONTINUED | OUTPATIENT
Start: 2024-02-05 | End: 2024-02-06 | Stop reason: HOSPADM

## 2024-02-05 NOTE — PROGRESS NOTES
Multilayer Compression Wrap   Below the Knee    NAME:  Alan Schwartz  YOB: 1940  MEDICAL RECORD NUMBER:  [id number]  DATE:  2/5/2024    Multilayer compression wrap: Removed old Multilayer wrap if indicated and wash leg with mild soap/water.  Applied moisturizing agent to dry skin as needed.   Applied primary and secondary dressing as ordered.  Applied multilayered dressing below the knee to right lower leg.  Applied multilayered dressing below the knee to left lower leg.  Instructed patient/caregiver not to remove dressing and to keep it clean and dry.   Instructed patient/caregiver on complications to report to provider, such as pain, numbness in toes, heavy drainage, and slippage of dressing.  Instructed patient on purpose of compression dressing and on activity and exercise recommendations.     Applied  2 layer wrap from toes to knee overlapping each time    Electronically signed by Yola Medina RN on 2/5/2024 at 11:41 AM

## 2024-02-05 NOTE — PATIENT INSTRUCTIONS
[facility]  [address]   [place], [place] [address]  Telephone: [phone]     FAX [phone]    Discharge Instructions    Important reminders:    **If you have any signs and symptoms of illness (Cough, fever, congestion, nausea, vomiting, diarrhea, etc.) please call the wound care center prior to your appointment.    1. Increase Protein intake for optimal wound healing  2. No added salt to reduce any swelling  3. If diabetic, maintain good glucose control  4. If you smoke, smoking prohibits wound healing, we ask that you refrain from smoking.  5. When taking antibiotics take the entire prescription as ordered. Do not stop taking until medication is all gone unless otherwise instructed.   6. Exercise as tolerated.   7. Keep weight off wounds and reposition every 2 hours if applicable.  8. If wound(s) is on your lower extremity, elevate legs to the level of the heart or above for 30 minutes 4-5 times a day and/or when sitting. Avoid standing for long periods of time.   9. Do not get wounds wet in bath or shower unless otherwise instructed by your physician. If your wound is on your foot or leg, you may purchase a cast bag. Please ask at the pharmacy.      If Vascular testing is ordered, please call [address] [place] [address] [phone]) to schedule.    Vascular tests ordered by Wound Care Physicians may take up to 2 hours to complete. Please keep that in mind when scheduling.     If Vascular testing is scheduled, please bring supplies to replace your dressing after testing is done. The vascular department does not stock supplies.     Wound:  both legs, Right foot    With each dressing change, rinse wounds with 0.9% Saline. (May use wound wash or soft contact solution. Both can be purchased at a local drug store). If unable to obtain saline, may use a gentle soap and water.    Dressing care: Elevated leg whenever sitting. Try sleeping in bed. Keep Glucose under 150  Foot wound - Keep felt on foot for the

## 2024-02-06 RX ORDER — EMPAGLIFLOZIN 10 MG/1
10 TABLET, FILM COATED ORAL DAILY
Qty: 30 TABLET | Refills: 2 | Status: SHIPPED | OUTPATIENT
Start: 2024-02-06

## 2024-02-06 NOTE — TELEPHONE ENCOUNTER
Medication:   Requested Prescriptions     Pending Prescriptions Disp Refills    JARDIANCE 10 MG tablet [Pharmacy Med Name: JARDIANCE 10 MG TABLET] 30 tablet 0     Sig: TAKE 1 TABLET BY MOUTH DAILY          Patient Phone Number: [phone] (home)     Last appt: 1/22/2024   Next appt: 2/16/2024    Last OARRS:        No data to display              PDMP Monitoring:    Last PDMP [place] as Reviewed ([place]):  Review User Review Instant Review Result          Preferred Pharmacy:   [facility] [id number] - [place] - [address] [phone] F [phone]  [address] [place] [address]  Phone: [phone] Fax: [phone]

## 2024-02-06 NOTE — PATIENT INSTRUCTIONS
[facility]  [address]   [place], [place] [address]  Telephone: [phone]     FAX [phone]    Discharge Instructions    Important reminders:    **If you have any signs and symptoms of illness (Cough, fever, congestion, nausea, vomiting, diarrhea, etc.) please call the wound care center prior to your appointment.    1. Increase Protein intake for optimal wound healing  2. No added salt to reduce any swelling  3. If diabetic, maintain good glucose control  4. If you smoke, smoking prohibits wound healing, we ask that you refrain from smoking.  5. When taking antibiotics take the entire prescription as ordered. Do not stop taking until medication is all gone unless otherwise instructed.   6. Exercise as tolerated.   7. Keep weight off wounds and reposition every 2 hours if applicable.  8. If wound(s) is on your lower extremity, elevate legs to the level of the heart or above for 30 minutes 4-5 times a day and/or when sitting. Avoid standing for long periods of time.   9. Do not get wounds wet in bath or shower unless otherwise instructed by your physician. If your wound is on your foot or leg, you may purchase a cast bag. Please ask at the pharmacy.      If Vascular testing is ordered, please call [address] [place] [address] [phone]) to schedule.    Vascular tests ordered by Wound Care Physicians may take up to 2 hours to complete. Please keep that in mind when scheduling.     If Vascular testing is scheduled, please bring supplies to replace your dressing after testing is done. The vascular department does not stock supplies.     Wound:  both legs, Right foot    With each dressing change, rinse wounds with 0.9% Saline. (May use wound wash or soft contact solution. Both can be purchased at a local drug store). If unable to obtain saline, may use a gentle soap and water.    Dressing care: Elevated leg whenever sitting. Try sleeping in bed. Keep Glucose under 150  Foot wound - Keep felt on foot for the

## 2024-02-07 ENCOUNTER — HOSPITAL ENCOUNTER (OUTPATIENT)
Dept: WOUND CARE | Age: 84
Discharge: HOME | End: 2024-02-07
Attending: PODIATRIST
Payer: MEDICARE

## 2024-02-07 VITALS — HEART RATE: 69 BPM | DIASTOLIC BLOOD PRESSURE: 60 MMHG | SYSTOLIC BLOOD PRESSURE: 131 MMHG | TEMPERATURE: 97.1 F

## 2024-02-07 DIAGNOSIS — L97.222 NON-PRESSURE CHRONIC ULCER OF LEFT CALF WITH FAT LAYER EXPOSED (HCC): ICD-10-CM

## 2024-02-07 DIAGNOSIS — Z79.4 UNCONTROLLED DIABETES MELLITUS WITH HYPERGLYCEMIA, WITH LONG-TERM CURRENT USE OF INSULIN (HCC): ICD-10-CM

## 2024-02-07 DIAGNOSIS — E11.65 UNCONTROLLED DIABETES MELLITUS WITH HYPERGLYCEMIA, WITH LONG-TERM CURRENT USE OF INSULIN (HCC): ICD-10-CM

## 2024-02-07 DIAGNOSIS — L97.512 RIGHT FOOT ULCER, WITH FAT LAYER EXPOSED (HCC): ICD-10-CM

## 2024-02-07 DIAGNOSIS — L97.211 NON-PRESSURE CHRONIC ULCER OF RIGHT CALF, LIMITED TO BREAKDOWN OF SKIN (HCC): ICD-10-CM

## 2024-02-07 DIAGNOSIS — L98.499 DIABETES MELLITUS WITH SKIN ULCER (HCC): Primary | ICD-10-CM

## 2024-02-07 DIAGNOSIS — E11.622 DIABETES MELLITUS WITH SKIN ULCER (HCC): Primary | ICD-10-CM

## 2024-02-07 PROCEDURE — 11042 DBRDMT SUBQ TIS 1ST 20SQCM/<: CPT

## 2024-02-07 PROCEDURE — 29581 APPL MULTLAYER CMPRN SYS LEG: CPT

## 2024-02-07 RX ORDER — TRIAMCINOLONE ACETONIDE 1 MG/G
OINTMENT TOPICAL ONCE
OUTPATIENT
Start: 2024-02-07 | End: 2024-02-07

## 2024-02-07 RX ORDER — LIDOCAINE HYDROCHLORIDE 40 MG/ML
SOLUTION TOPICAL ONCE
OUTPATIENT
Start: 2024-02-07 | End: 2024-02-07

## 2024-02-07 RX ORDER — LIDOCAINE 50 MG/G
OINTMENT TOPICAL ONCE
OUTPATIENT
Start: 2024-02-07 | End: 2024-02-07

## 2024-02-07 RX ORDER — BACITRACIN ZINC AND POLYMYXIN B SULFATE 500; 1000 [USP'U]/G; [USP'U]/G
OINTMENT TOPICAL ONCE
OUTPATIENT
Start: 2024-02-07 | End: 2024-02-07

## 2024-02-07 RX ORDER — GINSENG 100 MG
CAPSULE ORAL ONCE
OUTPATIENT
Start: 2024-02-07 | End: 2024-02-07

## 2024-02-07 RX ORDER — LIDOCAINE HYDROCHLORIDE 20 MG/ML
JELLY TOPICAL ONCE
OUTPATIENT
Start: 2024-02-07 | End: 2024-02-07

## 2024-02-07 RX ORDER — IBUPROFEN 200 MG
TABLET ORAL ONCE
OUTPATIENT
Start: 2024-02-07 | End: 2024-02-07

## 2024-02-07 RX ORDER — GENTAMICIN SULFATE 1 MG/G
OINTMENT TOPICAL ONCE
OUTPATIENT
Start: 2024-02-07 | End: 2024-02-07

## 2024-02-07 RX ORDER — LIDOCAINE 40 MG/G
CREAM TOPICAL ONCE
OUTPATIENT
Start: 2024-02-07 | End: 2024-02-07

## 2024-02-07 RX ORDER — LIDOCAINE 40 MG/G
CREAM TOPICAL ONCE
Status: DISCONTINUED | OUTPATIENT
Start: 2024-02-07 | End: 2024-02-08 | Stop reason: HOSPADM

## 2024-02-07 RX ORDER — SODIUM CHLOR/HYPOCHLOROUS ACID 0.033 %
SOLUTION, IRRIGATION IRRIGATION ONCE
OUTPATIENT
Start: 2024-02-07 | End: 2024-02-07

## 2024-02-07 RX ORDER — BETAMETHASONE DIPROPIONATE 0.5 MG/G
CREAM TOPICAL ONCE
OUTPATIENT
Start: 2024-02-07 | End: 2024-02-07

## 2024-02-07 RX ORDER — CLOBETASOL PROPIONATE 0.5 MG/G
OINTMENT TOPICAL ONCE
OUTPATIENT
Start: 2024-02-07 | End: 2024-02-07

## 2024-02-07 NOTE — PLAN OF CARE
Discharge instructions given.  Patient verbalized understanding.  Return to [facility] in 1 week(s).  Called/faxed orders to  [facility]

## 2024-02-07 NOTE — PLAN OF CARE
Compression Garments    Supply Company for Compression Stockings:     [employer] [address] [place] [address] f: [phone] f: [phone] p: [phone] [email or web address]      Ordering Center:     [facility]  [address]  [facility] [address]  [phone]  WOUND CARE Dept: [phone]   FAX NUMBER [phone]    Patient Information:      Alan Schwartz  [address]   [facility] [address]   [phone]   : 1940  AGE: 83 y.o.     GENDER: male   TODAYS DATE:  2024    Insurance:      PRIMARY INSURANCE:  Plan: MEDICARE PART A AND B  Coverage: MEDICARE  Effective Date: 2015  Group Number: [unfilled]  Subscriber Number: [id number] - (Medicare)    SECONDARY INSURANCE:  Plan: HUMANA MEDICARE SUPP  Coverage: HUMANA  Effective Date: 2015  [unfilled]    [unfilled]     [unfilled]   [unfilled]     Patient Information:      Problem List Items Addressed This Visit       Diabetes mellitus with skin ulcer (HCC) - Primary    Relevant Medications    lidocaine (LMX) 4 % cream    Other Relevant Orders    Initiate Outpatient Wound Care Protocol    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)    Relevant Medications    lidocaine (LMX) 4 % cream    Other Relevant Orders    Initiate Outpatient Wound Care Protocol    Uncontrolled diabetes mellitus with hyperglycemia, with long-term current use of insulin (HCC)    Relevant Medications    lidocaine (LMX) 4 % cream    Other Relevant Orders    Initiate Outpatient Wound Care Protocol    Right foot ulcer, with fat layer exposed (HCC)    Relevant Medications    lidocaine (LMX) 4 % cream    Other Relevant Orders    Initiate Outpatient Wound Care Protocol    Non-pressure chronic ulcer of right calf, 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

## 2024-02-07 NOTE — PROGRESS NOTES
[facility]  Progress Note and Procedure Note      Alan Schwartz  AGE: 83 y.o.   GENDER: male  : 1940  TODAY'S DATE:  2024    Subjective:     Chief Complaint   Patient presents with    Wound Check     Bilateral legs F/U         HISTORY of PRESENT ILLNESS HPI     Alan Schwartz is a 83 y.o. male who presents today for wound evaluation.   History of Wound: Patient presents today with wounds on multiple locations including bilateral legs and right foot.  Admits to be in an uncontrolled diabetic.  He states that he try to get that under control.  He states that his swelling and blood glucose control have been worse since his wife .     He has left the dressings intact since his last nurse visit.  Has had the dressing changed on Monday.  He thinks the legs are looking better.  He states his blood sugars have been staying much better.  He has no other complaints at this time.      Wound Pain:  none  Severity:  0 / 10   Wound Type:  venous, arterial, and diabetic  Modifying Factors:  edema, venous stasis, lymphedema, diabetes, poor glucose control, chronic pressure, decreased mobility, shear force, arterial insufficiency, decreased tissue oxygenation, and non-adherence  Associated Signs/Symptoms:  edema, drainage, and numbness        PAST MEDICAL HISTORY        Diagnosis Date    Arthropathy, unspecified, site unspecified     Hypertension     Hypertrophy of prostate without urinary obstruction and other lower urinary tract symptoms (LUTS)     Impotence of organic origin     Inguinal hernia     Myalgia and myositis, unspecified     Other and unspecified hyperlipidemia     Retinal microaneurysms NOS     Skin cancer     Type II or unspecified type diabetes mellitus without mention of complication, not stated as uncontrolled        PAST SURGICAL HISTORY    Past Surgical History:   Procedure Laterality Date    CATARACT REMOVAL Bilateral     CORONARY ANGIOPLASTY WITH STENT PLACEMENT

## 2024-02-07 NOTE — PROGRESS NOTES
Multilayer Compression Wrap   Below the Knee    NAME:  Alan Schwartz  YOB: 1940  MEDICAL RECORD NUMBER:  [id number]  DATE:  2/7/2024    Multilayer compression wrap: Removed old Multilayer wrap if indicated and wash leg with mild soap/water.  Applied moisturizing agent to dry skin as needed.   Applied primary and secondary dressing as ordered.  Applied multilayered dressing below the knee to right lower leg.  Applied multilayered dressing below the knee to left lower leg.  Instructed patient/caregiver not to remove dressing and to keep it clean and dry.   Instructed patient/caregiver on complications to report to provider, such as pain, numbness in toes, heavy drainage, and slippage of dressing.  Instructed patient on purpose of compression dressing and on activity and exercise recommendations.     Applied  2 layer wrap from toes to knee overlapping each time  Applied betadine to Plantar wound with dry dressing  Electronically signed by MEAGAN GEORGES RN on 2/7/2024 at 3:39 PM

## 2024-02-14 ENCOUNTER — TELEPHONE (OUTPATIENT)
Dept: FAMILY MEDICINE CLINIC | Age: 84
End: 2024-02-14

## 2024-02-14 ENCOUNTER — HOSPITAL ENCOUNTER (OUTPATIENT)
Dept: WOUND CARE | Age: 84
Discharge: HOME | End: 2024-02-14
Attending: PODIATRIST
Payer: MEDICARE

## 2024-02-14 VITALS
HEART RATE: 68 BPM | RESPIRATION RATE: 16 BRPM | SYSTOLIC BLOOD PRESSURE: 118 MMHG | TEMPERATURE: 97.1 F | DIASTOLIC BLOOD PRESSURE: 55 MMHG

## 2024-02-14 DIAGNOSIS — E11.65 UNCONTROLLED DIABETES MELLITUS WITH HYPERGLYCEMIA, WITH LONG-TERM CURRENT USE OF INSULIN (HCC): ICD-10-CM

## 2024-02-14 DIAGNOSIS — L97.222 NON-PRESSURE CHRONIC ULCER OF LEFT CALF WITH FAT LAYER EXPOSED (HCC): ICD-10-CM

## 2024-02-14 DIAGNOSIS — E11.622 DIABETES MELLITUS WITH SKIN ULCER (HCC): Primary | ICD-10-CM

## 2024-02-14 DIAGNOSIS — L97.512 RIGHT FOOT ULCER, WITH FAT LAYER EXPOSED (HCC): ICD-10-CM

## 2024-02-14 DIAGNOSIS — L98.499 DIABETES MELLITUS WITH SKIN ULCER (HCC): Primary | ICD-10-CM

## 2024-02-14 DIAGNOSIS — L97.211 NON-PRESSURE CHRONIC ULCER OF RIGHT CALF, LIMITED TO BREAKDOWN OF SKIN (HCC): ICD-10-CM

## 2024-02-14 DIAGNOSIS — Z79.4 UNCONTROLLED DIABETES MELLITUS WITH HYPERGLYCEMIA, WITH LONG-TERM CURRENT USE OF INSULIN (HCC): ICD-10-CM

## 2024-02-14 PROCEDURE — 29581 APPL MULTLAYER CMPRN SYS LEG: CPT

## 2024-02-14 PROCEDURE — 97597 DBRDMT OPN WND 1ST 20 CM/<: CPT

## 2024-02-14 RX ORDER — SODIUM CHLOR/HYPOCHLOROUS ACID 0.033 %
SOLUTION, IRRIGATION IRRIGATION ONCE
OUTPATIENT
Start: 2024-02-14 | End: 2024-02-14

## 2024-02-14 RX ORDER — BACITRACIN ZINC AND POLYMYXIN B SULFATE 500; 1000 [USP'U]/G; [USP'U]/G
OINTMENT TOPICAL ONCE
OUTPATIENT
Start: 2024-02-14 | End: 2024-02-14

## 2024-02-14 RX ORDER — CLOBETASOL PROPIONATE 0.5 MG/G
OINTMENT TOPICAL ONCE
OUTPATIENT
Start: 2024-02-14 | End: 2024-02-14

## 2024-02-14 RX ORDER — INSULIN LISPRO 100 [IU]/ML
INJECTION, SOLUTION INTRAVENOUS; SUBCUTANEOUS
Qty: 4 ADJUSTABLE DOSE PRE-FILLED PEN SYRINGE | Refills: 5 | Status: SHIPPED | OUTPATIENT
Start: 2024-02-14

## 2024-02-14 RX ORDER — TRIAMCINOLONE ACETONIDE 1 MG/G
OINTMENT TOPICAL ONCE
OUTPATIENT
Start: 2024-02-14 | End: 2024-02-14

## 2024-02-14 RX ORDER — LIDOCAINE HYDROCHLORIDE 20 MG/ML
JELLY TOPICAL ONCE
OUTPATIENT
Start: 2024-02-14 | End: 2024-02-14

## 2024-02-14 RX ORDER — GINSENG 100 MG
CAPSULE ORAL ONCE
OUTPATIENT
Start: 2024-02-14 | End: 2024-02-14

## 2024-02-14 RX ORDER — LIDOCAINE 40 MG/G
CREAM TOPICAL ONCE
OUTPATIENT
Start: 2024-02-14 | End: 2024-02-14

## 2024-02-14 RX ORDER — IBUPROFEN 200 MG
TABLET ORAL ONCE
OUTPATIENT
Start: 2024-02-14 | End: 2024-02-14

## 2024-02-14 RX ORDER — GENTAMICIN SULFATE 1 MG/G
OINTMENT TOPICAL ONCE
OUTPATIENT
Start: 2024-02-14 | End: 2024-02-14

## 2024-02-14 RX ORDER — BETAMETHASONE DIPROPIONATE 0.5 MG/G
CREAM TOPICAL ONCE
OUTPATIENT
Start: 2024-02-14 | End: 2024-02-14

## 2024-02-14 RX ORDER — LIDOCAINE 50 MG/G
OINTMENT TOPICAL ONCE
OUTPATIENT
Start: 2024-02-14 | End: 2024-02-14

## 2024-02-14 RX ORDER — LIDOCAINE 40 MG/G
CREAM TOPICAL ONCE
Status: DISCONTINUED | OUTPATIENT
Start: 2024-02-14 | End: 2024-02-15 | Stop reason: HOSPADM

## 2024-02-14 RX ORDER — LIDOCAINE HYDROCHLORIDE 40 MG/ML
SOLUTION TOPICAL ONCE
OUTPATIENT
Start: 2024-02-14 | End: 2024-02-14

## 2024-02-14 RX ORDER — INSULIN GLARGINE 100 [IU]/ML
30 INJECTION, SOLUTION SUBCUTANEOUS NIGHTLY
Qty: 5 ADJUSTABLE DOSE PRE-FILLED PEN SYRINGE | Refills: 3 | Status: SHIPPED
Start: 2024-02-14

## 2024-02-14 NOTE — TELEPHONE ENCOUNTER
Dropped off sugar log  Increased lantus to 35 units at last visit    AM sugars , most below 100  PM sugars 200-300's    Decrease lantus back to 30 units nightly as getting too low in AM    Needs to restart short acting mealtime insulin - sent to     Check sugars three times daily before meals and give insulin as follows    <100 none  101-130 2 units  131-160 4 units  161-190 6 units  191-220 8 units  221-250 10 units  251-280 12 units  281-310 14 units  311-330 16 units  331-360 18 units  361-390 20 units  > 390 22 units    I sent prescription to [facility]    Send me sugars in 2 weeks

## 2024-02-14 NOTE — PATIENT INSTRUCTIONS
[facility]  [address]   [place], [place] [address]  Telephone: [phone]     FAX [phone]    Discharge Instructions    Important reminders:    **If you have any signs and symptoms of illness (Cough, fever, congestion, nausea, vomiting, diarrhea, etc.) please call the wound care center prior to your appointment.    1. Increase Protein intake for optimal wound healing  2. No added salt to reduce any swelling  3. If diabetic, maintain good glucose control  4. If you smoke, smoking prohibits wound healing, we ask that you refrain from smoking.  5. When taking antibiotics take the entire prescription as ordered. Do not stop taking until medication is all gone unless otherwise instructed.   6. Exercise as tolerated.   7. Keep weight off wounds and reposition every 2 hours if applicable.  8. If wound(s) is on your lower extremity, elevate legs to the level of the heart or above for 30 minutes 4-5 times a day and/or when sitting. Avoid standing for long periods of time.   9. Do not get wounds wet in bath or shower unless otherwise instructed by your physician. If your wound is on your foot or leg, you may purchase a cast bag. Please ask at the pharmacy.      If Vascular testing is ordered, please call [address] [place] [address] [phone]) to schedule.    Vascular tests ordered by Wound Care Physicians may take up to 2 hours to complete. Please keep that in mind when scheduling.     If Vascular testing is scheduled, please bring supplies to replace your dressing after testing is done. The vascular department does not stock supplies.     Wound:  both legs, Right foot    With each dressing change, rinse wounds with 0.9% Saline. (May use wound wash or soft contact solution. Both can be purchased at a local drug store). If unable to obtain saline, may use a gentle soap and water.    Dressing care: Elevated leg whenever sitting. Try sleeping in bed. Keep Glucose under 150  Foot wound - Keep felt on foot for the

## 2024-02-14 NOTE — PROGRESS NOTES
Alan Schwartz is a 83 y.o. male who presents for follow-up of Type 2 diabetes mellitus.       Current medication use: taking as prescribed (basal insulin and Jardiance)  Eye exam current (within one year): no,  Exercise:  rarely, upper body video workouts  Do you eat balanced/healthy meals regularly? No   Do you limit your carbs (pasta/rice/bread/potatoes/sugar)  trying  Sees podiatrist: yes  Checks sugars at home:  3x/day  Hypoglycemia symptoms: one a week ago was 50 since change in meds, but didn't have symptoms   AM Fastin this morning    Before meals now more 100's since starting SSI      Following with wound care, legs and foot healing, has appt with podiatry next week        Body mass index is 24.25 kg/m².  Vitals:    24 1052   BP: (!) 110/50   Site: Left Upper Arm   Position: Sitting   Cuff Size: Medium Adult   Pulse: 55   Temp: 97.1 °F (36.2 °C)   TempSrc: Infrared   SpO2: 98%   Weight: 74.5 kg (164 lb 3.2 oz)     Wt Readings from Last 3 Encounters:   24 74.5 kg (164 lb 3.2 oz)   24 76 kg (167 lb 8 oz)   24 74.8 kg (165 lb)       GENERAL:Alert and oriented x 4 NAD, affect appropriate and overweight, well hydrated, well developed.  LUNG:clear to auscultation bilaterally with normal respiratory effort and good air movement  CV: Normal heart sounds, regular rate and rhythm without murmurs      Defer foot exam due to wound wrappings            2024    10:52 AM 2023    11:05 AM 2022     7:27 AM 3/28/2022     7:44 AM 3/1/2021    10:30 AM 2019    12:32 PM 2018     9:06 AM   PHQ Scores   PHQ2 Score 0 1 0 0 0 0 0   PHQ9 Score 0 1 0 0 0 0 0       Interpretation of Total Score Depression Severity: 1-4 = Minimal depression, 5-9 = Mild depression, 10-14 = Moderate depression, 15-19 = Moderately severe depression, 20-27 = Severe depression       ASSESSMENT AND PLAN:       Alan was seen today for diabetes.    Diagnoses and all orders for this visit:    Type 2

## 2024-02-14 NOTE — PLAN OF CARE
Discharge instructions given.  Patient verbalized understanding.  Return to [facility] in 1 week(s).

## 2024-02-14 NOTE — PROGRESS NOTES
Multilayer Compression Wrap   Below the Knee    NAME:  Aaln Schwartz  YOB: 1940  MEDICAL RECORD NUMBER:  [id number]  DATE:  2/14/2024    Multilayer compression wrap: Removed old Multilayer wrap if indicated and wash leg with mild soap/water.  Applied primary and secondary dressing as ordered.  Applied multilayered dressing below the knee to right lower leg.  Applied multilayered dressing below the knee to left lower leg.  Instructed patient/caregiver not to remove dressing and to keep it clean and dry.   Instructed patient/caregiver on complications to report to provider, such as pain, numbness in toes, heavy drainage, and slippage of dressing.  Instructed patient on purpose of compression dressing and on activity and exercise recommendations.     Applied  2 layer wrap from toes to knee overlapping each time    Electronically signed by ELIDA LAWSON LPN on 2/14/2024 at 3:30 PM        
   left shoulder replacement     SKIN CANCER EXCISION      top of head       FAMILY HISTORY    Family History   Problem Relation Age of Onset    Arthritis Father     No Known Problems Daughter     No Known Problems Daughter     No Known Problems Daughter     Lupus Neg Hx     Rheum Arthritis Neg Hx     Heart Disease Neg Hx        SOCIAL HISTORY    Social History     Tobacco Use    Smoking status: Never     Passive exposure: Past    Smokeless tobacco: Never   Vaping Use    Vaping Use: Never used   Substance Use Topics    Alcohol use: Yes     Alcohol/week: 0.0 standard drinks of alcohol     Comment: occasional wine    Drug use: No       ALLERGIES    Allergies   Allergen Reactions    Lipitor     Procardia [Nifedipine]     Statins Other (See Comments)     Myalgias: severe reaction.   Now taking vitamin D and can take low dose statin    Zestril [Lisinopril] Swelling     Ankles ~ 20 yrs ago    Tetracyclines & Related Rash       MEDICATIONS    Current Outpatient Medications on File Prior to Encounter   Medication Sig Dispense Refill    Probiotic Product (PROBIOTIC PO) Take by mouth      empagliflozin (JARDIANCE) 10 MG tablet TAKE 1 TABLET BY MOUTH DAILY 30 tablet 2    insulin glargine (LANTUS SOLOSTAR) 100 UNIT/ML injection pen Inject 35 Units into the skin nightly 5 Adjustable Dose Pre-filled Pen Syringe 3    furosemide (LASIX) 20 MG tablet Alternating 20 mg and 40 mg daily 60 tablet 5    metoprolol succinate (TOPROL XL) 50 MG extended release tablet Take 1 tablet by mouth daily 90 tablet 3    levothyroxine (SYNTHROID) 50 MCG tablet Take 1 tablet by mouth daily 90 tablet 3    FREESTYLE LITE strip USE TO TEST BLOOD SUGAR THREE TIMES A  strip 3    Insulin Pen Needle ([employer] PEN NEEDLES) 31G X 6 MM MISC 1 each by Does not apply route daily 100 each 3    carbidopa-levodopa (SINEMET)  MG per tablet TAKE 1 AND 1/2 TABLET BY MOUTH THREE TIMES A  tablet 1    rosuvastatin (CRESTOR) 5 MG tablet TAKE ONE TABLET BY

## 2024-02-16 ENCOUNTER — OFFICE VISIT (OUTPATIENT)
Dept: FAMILY MEDICINE CLINIC | Age: 84
End: 2024-02-16

## 2024-02-16 VITALS
DIASTOLIC BLOOD PRESSURE: 50 MMHG | WEIGHT: 164.2 LBS | BODY MASS INDEX: 24.25 KG/M2 | TEMPERATURE: 97.1 F | OXYGEN SATURATION: 98 % | HEART RATE: 55 BPM | SYSTOLIC BLOOD PRESSURE: 110 MMHG

## 2024-02-16 DIAGNOSIS — L97.512 RIGHT FOOT ULCER, WITH FAT LAYER EXPOSED (HCC): ICD-10-CM

## 2024-02-16 DIAGNOSIS — E11.65 TYPE 2 DIABETES MELLITUS WITH HYPERGLYCEMIA, WITH LONG-TERM CURRENT USE OF INSULIN (HCC): Primary | ICD-10-CM

## 2024-02-16 DIAGNOSIS — Z79.4 TYPE 2 DIABETES MELLITUS WITH HYPERGLYCEMIA, WITH LONG-TERM CURRENT USE OF INSULIN (HCC): Primary | ICD-10-CM

## 2024-02-19 ENCOUNTER — OFFICE VISIT (OUTPATIENT)
Dept: NEUROLOGY | Age: 84
End: 2024-02-19
Payer: MEDICARE

## 2024-02-19 VITALS
WEIGHT: 164 LBS | HEART RATE: 66 BPM | DIASTOLIC BLOOD PRESSURE: 61 MMHG | SYSTOLIC BLOOD PRESSURE: 134 MMHG | BODY MASS INDEX: 24.22 KG/M2

## 2024-02-19 DIAGNOSIS — G20.B1 PARKINSON'S DISEASE WITH DYSKINESIA WITHOUT FLUCTUATING MANIFESTATIONS: Primary | ICD-10-CM

## 2024-02-19 DIAGNOSIS — R27.0 ATAXIA: ICD-10-CM

## 2024-02-19 DIAGNOSIS — E11.42 DIABETIC PERIPHERAL NEUROPATHY (HCC): ICD-10-CM

## 2024-02-19 PROCEDURE — 3046F HEMOGLOBIN A1C LEVEL >9.0%: CPT

## 2024-02-19 PROCEDURE — 3078F DIAST BP <80 MM HG: CPT

## 2024-02-19 PROCEDURE — 99214 OFFICE O/P EST MOD 30 MIN: CPT

## 2024-02-19 PROCEDURE — 1036F TOBACCO NON-USER: CPT

## 2024-02-19 PROCEDURE — G8427 DOCREV CUR MEDS BY ELIG CLIN: HCPCS

## 2024-02-19 PROCEDURE — 3075F SYST BP GE 130 - 139MM HG: CPT

## 2024-02-19 PROCEDURE — 1123F ACP DISCUSS/DSCN MKR DOCD: CPT

## 2024-02-19 PROCEDURE — G8420 CALC BMI NORM PARAMETERS: HCPCS

## 2024-02-19 PROCEDURE — G8484 FLU IMMUNIZE NO ADMIN: HCPCS

## 2024-02-19 NOTE — PATIENT INSTRUCTIONS
[facility]  [address]   [place], [place] [address]  Telephone: [phone]     FAX [phone]    Discharge Instructions - Felt and small gauze and tape. Check for drainage for the next week. Wear compression stockings    Congratulations! You have completed your treatment program!    To prevent Venous Wounds from recurring again:  Elevate your legs at least parallel to the ground while sitting.  Wear your prescription support stockings everyday and remove at night while you sleep.  Replace your stockings every 4-6 months so that your legs continue to get the support they need.  Inspect your legs and feet daily and report any increased swelling, unusual redness or new wounds to your physician.  Wash your legs and feet regularly with mild soap and water and moisturize daily.  Continue to use compression pumps if prescribed by your physician.  Avoid overeating. Extra weight adds pressure on the veins in your legs.  Limit salty foods as they promote swelling or fluid retention in your legs.    Congratulations! You have completed your treatment program!    Congratulations! You have completed the wound care program.  How can I prevent Diabetic wounds from occurring again?    Foot Care:   Never go barefoot, indoors or outdoors. (If you have neuropathy, you may not feel and injury to your feet if ir occurs)  Inspect your feet daily. Use a mirror and check for any wounds, redness, or skin cracks. Make sure to inspect between toes. (If you have vision problems, ask a family member or friend to look at your feet for you)  Wash your feet daily. Test the water with you forearm to make sure it isn't too hot. (With neuropathy, you may not be able to tell the temperature of the water with your feet). Dry your feet thoroughly after washing.   Do not soak your feet unless your physician tells you to.  Avoid exposure to extreme temperatures.  Moisturize your legs and feet daily to avoid skin cracks. Do not

## 2024-02-19 NOTE — PROGRESS NOTES
The patient came today for follow up regarding: Parkinson's disease      This is my first encounter with the patient.  The patient was seen by Dr. Gentile.  I was able to review the patient's record, imaging, notes from different physicians and recent events.    Interval history:  History obtained from patient and family.  Patient reports unchanged symptoms since last visit.  He does have upper extremity tremors, unchanged.  No recent falling.  No change in appetite or his sleep.  He has been using his cane to ambulate.  Continues to live by himself at home and family dog.  Family lives nearby and checks in on patient frequently.       Background history:    Patient complaints of tremors in both his arms.  It initially started about a year ago and seems to be slowly getting worse.  Onset was gradual.  Symptoms are persistent.  No clear aggravating or relieving factors to symptoms.  Patient denies any stiffness.  He denies any difficulty with his gait   Patient has known diabetes which is well controlled  Patient denies any difficulty getting in and out of a car or rolling over in bed.  Patient complains of some generalized weakness in his legs        Exam:   Constitutional:   Vitals:    02/19/24 1146   BP: 134/61   Pulse: 66   Weight: 74.4 kg (164 lb)       General appearance:  Normal development and appear in no acute distress.   Mental Status:   Oriented to person, place, problem, and time.    Memory: Good immediate recall.  Intact remote memory  Normal attention span and concentration.  Language: intact naming, repeating and fluency   Good fund of Knowledge. Aware of current events and vocabulary   Cranial Nerves:   II: Pupils: equal, round, reactive to light  III,IV,VI: Extra Ocular Movements are intact. No nystagmus  V: Facial sensation is intact   VII: Facial strength and movements: intact and symmetric  XII: Tongue movements are normal  Musculoskeletal: 5/5 in all 4 extremities.   Tone: Mild rigidity bilateral

## 2024-02-21 ENCOUNTER — HOSPITAL ENCOUNTER (OUTPATIENT)
Dept: WOUND CARE | Age: 84
Discharge: HOME | End: 2024-02-21
Attending: PODIATRIST
Payer: MEDICARE

## 2024-02-21 VITALS
HEART RATE: 67 BPM | TEMPERATURE: 97 F | DIASTOLIC BLOOD PRESSURE: 57 MMHG | SYSTOLIC BLOOD PRESSURE: 116 MMHG | RESPIRATION RATE: 16 BRPM

## 2024-02-21 DIAGNOSIS — E11.65 UNCONTROLLED DIABETES MELLITUS WITH HYPERGLYCEMIA, WITH LONG-TERM CURRENT USE OF INSULIN (HCC): ICD-10-CM

## 2024-02-21 DIAGNOSIS — L97.512 RIGHT FOOT ULCER, WITH FAT LAYER EXPOSED (HCC): ICD-10-CM

## 2024-02-21 DIAGNOSIS — E11.622 DIABETES MELLITUS WITH SKIN ULCER (HCC): Primary | ICD-10-CM

## 2024-02-21 DIAGNOSIS — L97.222 NON-PRESSURE CHRONIC ULCER OF LEFT CALF WITH FAT LAYER EXPOSED (HCC): ICD-10-CM

## 2024-02-21 DIAGNOSIS — Z79.4 UNCONTROLLED DIABETES MELLITUS WITH HYPERGLYCEMIA, WITH LONG-TERM CURRENT USE OF INSULIN (HCC): ICD-10-CM

## 2024-02-21 DIAGNOSIS — L98.499 DIABETES MELLITUS WITH SKIN ULCER (HCC): Primary | ICD-10-CM

## 2024-02-21 DIAGNOSIS — L97.211 NON-PRESSURE CHRONIC ULCER OF RIGHT CALF, LIMITED TO BREAKDOWN OF SKIN (HCC): ICD-10-CM

## 2024-02-21 PROCEDURE — 99213 OFFICE O/P EST LOW 20 MIN: CPT

## 2024-02-21 RX ORDER — LIDOCAINE 40 MG/G
CREAM TOPICAL ONCE
OUTPATIENT
Start: 2024-02-21 | End: 2024-02-21

## 2024-02-21 RX ORDER — GENTAMICIN SULFATE 1 MG/G
OINTMENT TOPICAL ONCE
OUTPATIENT
Start: 2024-02-21 | End: 2024-02-21

## 2024-02-21 RX ORDER — CLOBETASOL PROPIONATE 0.5 MG/G
OINTMENT TOPICAL ONCE
OUTPATIENT
Start: 2024-02-21 | End: 2024-02-21

## 2024-02-21 RX ORDER — LIDOCAINE HYDROCHLORIDE 20 MG/ML
JELLY TOPICAL ONCE
OUTPATIENT
Start: 2024-02-21 | End: 2024-02-21

## 2024-02-21 RX ORDER — TRIAMCINOLONE ACETONIDE 1 MG/G
OINTMENT TOPICAL ONCE
OUTPATIENT
Start: 2024-02-21 | End: 2024-02-21

## 2024-02-21 RX ORDER — BETAMETHASONE DIPROPIONATE 0.5 MG/G
CREAM TOPICAL ONCE
OUTPATIENT
Start: 2024-02-21 | End: 2024-02-21

## 2024-02-21 RX ORDER — GINSENG 100 MG
CAPSULE ORAL ONCE
OUTPATIENT
Start: 2024-02-21 | End: 2024-02-21

## 2024-02-21 RX ORDER — LIDOCAINE HYDROCHLORIDE 40 MG/ML
SOLUTION TOPICAL ONCE
OUTPATIENT
Start: 2024-02-21 | End: 2024-02-21

## 2024-02-21 RX ORDER — SODIUM CHLOR/HYPOCHLOROUS ACID 0.033 %
SOLUTION, IRRIGATION IRRIGATION ONCE
OUTPATIENT
Start: 2024-02-21 | End: 2024-02-21

## 2024-02-21 RX ORDER — IBUPROFEN 200 MG
TABLET ORAL ONCE
OUTPATIENT
Start: 2024-02-21 | End: 2024-02-21

## 2024-02-21 RX ORDER — LIDOCAINE 40 MG/G
CREAM TOPICAL ONCE
Status: DISCONTINUED | OUTPATIENT
Start: 2024-02-21 | End: 2024-02-22 | Stop reason: HOSPADM

## 2024-02-21 RX ORDER — BACITRACIN ZINC AND POLYMYXIN B SULFATE 500; 1000 [USP'U]/G; [USP'U]/G
OINTMENT TOPICAL ONCE
OUTPATIENT
Start: 2024-02-21 | End: 2024-02-21

## 2024-02-21 RX ORDER — LIDOCAINE 50 MG/G
OINTMENT TOPICAL ONCE
OUTPATIENT
Start: 2024-02-21 | End: 2024-02-21

## 2024-02-24 NOTE — PROGRESS NOTES
[facility]  Progress Note and Procedure Note      Alan Schwartz  AGE: 83 y.o.   GENDER: male  : 1940  TODAY'S DATE:  2024    Subjective:     Chief Complaint   Patient presents with    Wound Check         HISTORY of PRESENT ILLNESS HPI     Alan Schwartz is a 83 y.o. male who presents today for wound evaluation.   History of Wound: Patient presents today with wounds on multiple locations including bilateral legs and right foot.  Admits to be in an uncontrolled diabetic.  He states that he try to get that under control.  He states that his swelling and blood glucose control have been worse since his wife .     He states it is no problems since his last visit has been changing the bandage as directed.  He had no drainage last few days from the foot wound.  He has his compression garments      Wound Pain:  none  Severity:  0 / 10   Wound Type:  venous, arterial, and diabetic  Modifying Factors:  edema, venous stasis, lymphedema, diabetes, poor glucose control, chronic pressure, decreased mobility, shear force, arterial insufficiency, decreased tissue oxygenation, and non-adherence  Associated Signs/Symptoms:  edema, drainage, and numbness        PAST MEDICAL HISTORY        Diagnosis Date    Arthropathy, unspecified, site unspecified     Hypertension     Hypertrophy of prostate without urinary obstruction and other lower urinary tract symptoms (LUTS)     Impotence of organic origin     Inguinal hernia     Myalgia and myositis, unspecified     Other and unspecified hyperlipidemia     Retinal microaneurysms NOS     Skin cancer     Type II or unspecified type diabetes mellitus without mention of complication, not stated as uncontrolled        PAST SURGICAL HISTORY    Past Surgical History:   Procedure Laterality Date    CATARACT REMOVAL Bilateral     CORONARY ANGIOPLASTY WITH STENT PLACEMENT  2013    INGUINAL HERNIA REPAIR Left     LUMBAR DISC SURGERY      SHOULDER SURGERY

## 2024-02-27 RX ORDER — INSULIN GLARGINE 100 [IU]/ML
30 INJECTION, SOLUTION SUBCUTANEOUS NIGHTLY
Qty: 5 ADJUSTABLE DOSE PRE-FILLED PEN SYRINGE | Refills: 3 | Status: SHIPPED | OUTPATIENT
Start: 2024-02-27

## 2024-02-27 NOTE — TELEPHONE ENCOUNTER
insulin glargine (LANTUS SOLOSTAR) 100 UNIT/ML injection pen [[id number]]       [facility] [id number] - [place] [address] [address] [phone] - F [phone] [[id number]]

## 2024-02-27 NOTE — TELEPHONE ENCOUNTER
Per [facility] :     They called stating the pt informed them he has went up 30 units every night.    They would like the doctor to update the Rx so that the pt insurance will cover the script.     Please advise...

## 2024-04-12 ENCOUNTER — HOSPITAL ENCOUNTER (OUTPATIENT)
Age: 84
Discharge: HOME | End: 2024-04-12
Payer: MEDICARE

## 2024-04-12 DIAGNOSIS — Z79.4 TYPE 2 DIABETES MELLITUS WITH HYPERGLYCEMIA, WITH LONG-TERM CURRENT USE OF INSULIN (HCC): ICD-10-CM

## 2024-04-12 DIAGNOSIS — E11.65 TYPE 2 DIABETES MELLITUS WITH HYPERGLYCEMIA, WITH LONG-TERM CURRENT USE OF INSULIN (HCC): ICD-10-CM

## 2024-04-12 LAB
ANION GAP SERPL CALCULATED.3IONS-SCNC: 18 MMOL/L (ref 3–16)
BUN SERPL-MCNC: 53 MG/DL (ref 7–20)
CALCIUM SERPL-MCNC: 9 MG/DL (ref 8.3–10.6)
CHLORIDE SERPL-SCNC: 100 MMOL/L (ref 99–110)
CO2 SERPL-SCNC: 22 MMOL/L (ref 21–32)
CREAT SERPL-MCNC: 1.7 MG/DL (ref 0.8–1.3)
GFR SERPLBLD CREATININE-BSD FMLA CKD-EPI: 39 ML/MIN/{1.73_M2}
GLUCOSE SERPL-MCNC: 107 MG/DL (ref 70–99)
POTASSIUM SERPL-SCNC: 4.1 MMOL/L (ref 3.5–5.1)
SODIUM SERPL-SCNC: 140 MMOL/L (ref 136–145)
TSH SERPL DL<=0.005 MIU/L-ACNC: 2.36 UIU/ML (ref 0.27–4.2)

## 2024-04-12 PROCEDURE — 36415 COLL VENOUS BLD VENIPUNCTURE: CPT

## 2024-04-12 PROCEDURE — 84443 ASSAY THYROID STIM HORMONE: CPT

## 2024-04-12 PROCEDURE — 83036 HEMOGLOBIN GLYCOSYLATED A1C: CPT

## 2024-04-12 PROCEDURE — 80048 BASIC METABOLIC PNL TOTAL CA: CPT

## 2024-04-13 LAB
EST. AVERAGE GLUCOSE BLD GHB EST-MCNC: 188.6 MG/DL
HBA1C MFR BLD: 8.2 %

## 2024-04-17 NOTE — PROGRESS NOTES
months (around 7/19/2024) for Diabetes, 30 min.         Portions of Note per  Nneka Merritt CMA AAMA with corrections and edits per Madison Tapia MD.  I agree with entirety of note and was present and performed history and physical.  I also confirm that the note above accurately reflects all work, treatment, procedures, and medical decision making performed by me, Madison Tapia MD

## 2024-04-19 ENCOUNTER — OFFICE VISIT (OUTPATIENT)
Dept: FAMILY MEDICINE CLINIC | Age: 84
End: 2024-04-19

## 2024-04-19 VITALS
OXYGEN SATURATION: 96 % | WEIGHT: 160 LBS | SYSTOLIC BLOOD PRESSURE: 90 MMHG | DIASTOLIC BLOOD PRESSURE: 50 MMHG | BODY MASS INDEX: 23.63 KG/M2 | TEMPERATURE: 97.9 F | HEART RATE: 64 BPM

## 2024-04-19 DIAGNOSIS — N18.32 TYPE 2 DIABETES MELLITUS WITH STAGE 3B CHRONIC KIDNEY DISEASE, WITH LONG-TERM CURRENT USE OF INSULIN (HCC): ICD-10-CM

## 2024-04-19 DIAGNOSIS — Z79.4 TYPE 2 DIABETES MELLITUS WITH HYPERGLYCEMIA, WITH LONG-TERM CURRENT USE OF INSULIN (HCC): Primary | ICD-10-CM

## 2024-04-19 DIAGNOSIS — E03.9 ACQUIRED HYPOTHYROIDISM: ICD-10-CM

## 2024-04-19 DIAGNOSIS — I10 PRIMARY HYPERTENSION: ICD-10-CM

## 2024-04-19 DIAGNOSIS — E11.65 TYPE 2 DIABETES MELLITUS WITH HYPERGLYCEMIA, WITH LONG-TERM CURRENT USE OF INSULIN (HCC): Primary | ICD-10-CM

## 2024-04-19 DIAGNOSIS — E11.22 TYPE 2 DIABETES MELLITUS WITH STAGE 3B CHRONIC KIDNEY DISEASE, WITH LONG-TERM CURRENT USE OF INSULIN (HCC): ICD-10-CM

## 2024-04-19 DIAGNOSIS — G20.A1 PARKINSON'S DISEASE, UNSPECIFIED WHETHER DYSKINESIA PRESENT, UNSPECIFIED WHETHER MANIFESTATIONS FLUCTUATE (HCC): ICD-10-CM

## 2024-04-19 DIAGNOSIS — Z79.4 TYPE 2 DIABETES MELLITUS WITH STAGE 3B CHRONIC KIDNEY DISEASE, WITH LONG-TERM CURRENT USE OF INSULIN (HCC): ICD-10-CM

## 2024-04-19 RX ORDER — INSULIN GLARGINE 100 [IU]/ML
26 INJECTION, SOLUTION SUBCUTANEOUS NIGHTLY
Qty: 5 ADJUSTABLE DOSE PRE-FILLED PEN SYRINGE | Refills: 3 | Status: SHIPPED
Start: 2024-04-19

## 2024-04-19 RX ORDER — METOPROLOL SUCCINATE 25 MG/1
25 TABLET, EXTENDED RELEASE ORAL DAILY
Qty: 90 TABLET | Refills: 3 | Status: SHIPPED | OUTPATIENT
Start: 2024-04-19

## 2024-04-19 RX ORDER — PEN NEEDLE, DIABETIC 31 G X1/4"
1 NEEDLE, DISPOSABLE MISCELLANEOUS DAILY
Qty: 100 EACH | Refills: 12 | Status: SHIPPED | OUTPATIENT
Start: 2024-04-19

## 2024-04-19 NOTE — PATIENT INSTRUCTIONS
Monitor blood pressure 2-3 times a week and drop off readings for review if high or low.    Would like top number over 100 and bottom over 60    Need a new cuff? Check this website to make sure your BP cuff has been validated for accuracy:    www.validatebp.org

## 2024-04-24 DIAGNOSIS — Z79.4 TYPE 2 DIABETES MELLITUS WITH HYPERGLYCEMIA, WITH LONG-TERM CURRENT USE OF INSULIN (HCC): ICD-10-CM

## 2024-04-24 DIAGNOSIS — E11.65 TYPE 2 DIABETES MELLITUS WITH HYPERGLYCEMIA, WITH LONG-TERM CURRENT USE OF INSULIN (HCC): ICD-10-CM

## 2024-04-24 RX ORDER — PEN NEEDLE, DIABETIC 31 G X1/4"
1 NEEDLE, DISPOSABLE MISCELLANEOUS DAILY
Qty: 100 EACH | Refills: 12 | Status: SHIPPED | OUTPATIENT
Start: 2024-04-24

## 2024-04-29 RX ORDER — INSULIN GLARGINE 100 [IU]/ML
26 INJECTION, SOLUTION SUBCUTANEOUS NIGHTLY
Qty: 5 ADJUSTABLE DOSE PRE-FILLED PEN SYRINGE | Refills: 3 | Status: SHIPPED | OUTPATIENT
Start: 2024-04-29

## 2024-04-29 NOTE — TELEPHONE ENCOUNTER
insulin glargine (LANTUS SOLOSTAR) 100 UNIT/ML injection pen [[id number]]       [facility] [id number] - [place] [address] [address] [phone] - F [phone] [[id number]]

## 2024-04-29 NOTE — TELEPHONE ENCOUNTER
Medication:   Requested Prescriptions     Pending Prescriptions Disp Refills    insulin glargine (LANTUS SOLOSTAR) 100 UNIT/ML injection pen 5 Adjustable Dose Pre-filled Pen Syringe 3     Sig: Inject 26 Units into the skin nightly          Patient Phone Number: [phone] (home)     Last appt: 4/19/2024   Next appt: 7/19/2024    Last OARRS:        No data to display              PDMP Monitoring:    Last PDMP Guzman as Reviewed ([place]):  Review User Review Instant Review Result          Preferred Pharmacy:   [facility] [id number] - [place] - [address] [phone] - F [phone]  [address] [place] [address]  Phone: [phone] Fax: [phone]

## 2024-05-14 RX ORDER — EMPAGLIFLOZIN 10 MG/1
10 TABLET, FILM COATED ORAL DAILY
Qty: 30 TABLET | Refills: 12 | Status: SHIPPED | OUTPATIENT
Start: 2024-05-14

## 2024-05-20 ENCOUNTER — HOSPITAL ENCOUNTER (OUTPATIENT)
Age: 84
Discharge: HOME | End: 2024-05-20
Payer: MEDICARE

## 2024-05-20 DIAGNOSIS — N18.32 STAGE 3B CHRONIC KIDNEY DISEASE (HCC): ICD-10-CM

## 2024-05-20 DIAGNOSIS — E55.9 VITAMIN D DEFICIENCY: ICD-10-CM

## 2024-05-20 LAB
25(OH)D3 SERPL-MCNC: 74.2 NG/ML
ALBUMIN SERPL-MCNC: 3.9 G/DL (ref 3.4–5)
ANION GAP SERPL CALCULATED.3IONS-SCNC: 9 MMOL/L (ref 3–16)
BUN SERPL-MCNC: 42 MG/DL (ref 7–20)
CALCIUM SERPL-MCNC: 8.8 MG/DL (ref 8.3–10.6)
CHLORIDE SERPL-SCNC: 102 MMOL/L (ref 99–110)
CO2 SERPL-SCNC: 28 MMOL/L (ref 21–32)
CREAT SERPL-MCNC: 1.5 MG/DL (ref 0.8–1.3)
CREAT UR-MCNC: 30.1 MG/DL (ref 39–259)
DEPRECATED RDW RBC AUTO: 15.6 % (ref 12.4–15.4)
GFR SERPLBLD CREATININE-BSD FMLA CKD-EPI: 45 ML/MIN/{1.73_M2}
GLUCOSE SERPL-MCNC: 114 MG/DL (ref 70–99)
HCT VFR BLD AUTO: 45.6 % (ref 40.5–52.5)
HGB BLD-MCNC: 15 G/DL (ref 13.5–17.5)
MCH RBC QN AUTO: 29.1 PG (ref 26–34)
MCHC RBC AUTO-ENTMCNC: 32.9 G/DL (ref 31–36)
MCV RBC AUTO: 88.7 FL (ref 80–100)
MICROALBUMIN UR DL<=1MG/L-MCNC: 1.5 MG/DL
MICROALBUMIN/CREAT UR: 49.8 MG/G (ref 0–30)
PHOSPHATE SERPL-MCNC: 3.5 MG/DL (ref 2.5–4.9)
PLATELET # BLD AUTO: 329 K/UL (ref 135–450)
PMV BLD AUTO: 8.1 FL (ref 5–10.5)
POTASSIUM SERPL-SCNC: 4.1 MMOL/L (ref 3.5–5.1)
PROT UR-MCNC: 6 MG/DL
PROT/CREAT UR-RTO: 0.2 MG/DL
PTH-INTACT SERPL-MCNC: 53.9 PG/ML (ref 14–72)
RBC # BLD AUTO: 5.14 M/UL (ref 4.2–5.9)
SODIUM SERPL-SCNC: 139 MMOL/L (ref 136–145)
WBC # BLD AUTO: 8.8 K/UL (ref 4–11)

## 2024-05-20 PROCEDURE — 36415 COLL VENOUS BLD VENIPUNCTURE: CPT

## 2024-05-20 PROCEDURE — 82306 VITAMIN D 25 HYDROXY: CPT

## 2024-05-20 PROCEDURE — 85027 COMPLETE CBC AUTOMATED: CPT

## 2024-05-20 PROCEDURE — 84156 ASSAY OF PROTEIN URINE: CPT

## 2024-05-20 PROCEDURE — 82043 UR ALBUMIN QUANTITATIVE: CPT

## 2024-05-20 PROCEDURE — 83970 ASSAY OF PARATHORMONE: CPT

## 2024-05-20 PROCEDURE — 80069 RENAL FUNCTION PANEL: CPT

## 2024-05-20 PROCEDURE — 82570 ASSAY OF URINE CREATININE: CPT

## 2024-06-05 ENCOUNTER — OFFICE VISIT (OUTPATIENT)
Dept: PULMONOLOGY | Age: 84
End: 2024-06-05
Payer: MEDICARE

## 2024-06-05 VITALS
BODY MASS INDEX: 24.14 KG/M2 | SYSTOLIC BLOOD PRESSURE: 136 MMHG | OXYGEN SATURATION: 90 % | HEIGHT: 69 IN | DIASTOLIC BLOOD PRESSURE: 70 MMHG | WEIGHT: 163 LBS | HEART RATE: 71 BPM

## 2024-06-05 DIAGNOSIS — J30.1 NON-SEASONAL ALLERGIC RHINITIS DUE TO POLLEN: ICD-10-CM

## 2024-06-05 DIAGNOSIS — J84.9 INTERSTITIAL LUNG DISEASE (HCC): Primary | ICD-10-CM

## 2024-06-05 DIAGNOSIS — R05.3 CHRONIC COUGH: ICD-10-CM

## 2024-06-05 PROCEDURE — G8420 CALC BMI NORM PARAMETERS: HCPCS | Performed by: INTERNAL MEDICINE

## 2024-06-05 PROCEDURE — 3075F SYST BP GE 130 - 139MM HG: CPT | Performed by: INTERNAL MEDICINE

## 2024-06-05 PROCEDURE — 99214 OFFICE O/P EST MOD 30 MIN: CPT | Performed by: INTERNAL MEDICINE

## 2024-06-05 PROCEDURE — G8427 DOCREV CUR MEDS BY ELIG CLIN: HCPCS | Performed by: INTERNAL MEDICINE

## 2024-06-05 PROCEDURE — 3078F DIAST BP <80 MM HG: CPT | Performed by: INTERNAL MEDICINE

## 2024-06-05 PROCEDURE — 1123F ACP DISCUSS/DSCN MKR DOCD: CPT | Performed by: INTERNAL MEDICINE

## 2024-06-05 PROCEDURE — 1036F TOBACCO NON-USER: CPT | Performed by: INTERNAL MEDICINE

## 2024-06-05 NOTE — PROGRESS NOTES
PULMONARY OFFICE FOLLOW-UP VISIT    CONSULTING PHYSICIAN:  Madison Tapia MD     REASON FOR VISIT:   Chief Complaint   Patient presents with    ILD       DATE OF VISIT: 6/5/2024    HISTORY OF PRESENT ILLNESS: 83 y.o. year old male is into the pulmonary office for a follow-up.  Reports stable breathing.  Denies any new symptoms.  No persistent cough.  Has been trying to stay active.  Does walk slowly due to his Parkinson disease.  Has not used oxygen while ambulating.  Reports that whenever he is checking his oxygen level at home it is generally 90% and above.  Recently had a lot of leg wounds and has been going to wound center for management.  Does have diabetes.      Previously: Patient reports that his breathing has been stable.  He has been walking slowly mostly because of his Parkinson disease.  Denies any wheezing, cough, discolored expectoration.  Denies any chest pain or chest tightness.  Frequently checks his oxygen level at home.  Reports that it mostly stays in the 90s. Patient suffers with Parkinson disease for the last 4 to 5 years.  He has been on Sinemet therapy.  While his tremors have been under control, his balance has been off.  In June of this year he had a fall at home and was admitted to [facility].  He had an extensive diagnostic work-up done including a CT chest.  CT chest showed abnormal findings.  At that time patient was having some dry cough which she felt was due to postnasal drip.  Currently denies any respiratory symptoms.  No shortness of breath, no cough, no expectoration, no wheezing, no chest pain or chest tightness.  Denies any fevers or night sweats.  Also denies any arthralgias, oral ulcers, alopecia, Raynaud's phenomena.  Has had some skin rash in the lower extremities which have been swollen.  Lifelong non-smoker.  Worked as an  in [employer].  Denies any occupational exposures to asbestos, inorganic dust or radiation.    REVIEW OF

## 2024-07-10 ENCOUNTER — HOSPITAL ENCOUNTER (OUTPATIENT)
Age: 84
Discharge: HOME | End: 2024-07-10
Payer: MEDICARE

## 2024-07-10 DIAGNOSIS — E11.65 TYPE 2 DIABETES MELLITUS WITH HYPERGLYCEMIA, WITH LONG-TERM CURRENT USE OF INSULIN (HCC): ICD-10-CM

## 2024-07-10 DIAGNOSIS — E03.9 ACQUIRED HYPOTHYROIDISM: ICD-10-CM

## 2024-07-10 DIAGNOSIS — Z79.4 TYPE 2 DIABETES MELLITUS WITH HYPERGLYCEMIA, WITH LONG-TERM CURRENT USE OF INSULIN (HCC): ICD-10-CM

## 2024-07-10 LAB
ALBUMIN SERPL-MCNC: 3.9 G/DL (ref 3.4–5)
ALBUMIN/GLOB SERPL: 1 {RATIO} (ref 1.1–2.2)
ALP SERPL-CCNC: 75 U/L (ref 40–129)
ALT SERPL-CCNC: <5 U/L (ref 10–40)
ANION GAP SERPL CALCULATED.3IONS-SCNC: 13 MMOL/L (ref 3–16)
AST SERPL-CCNC: 22 U/L (ref 15–37)
BILIRUB SERPL-MCNC: 1.1 MG/DL (ref 0–1)
BUN SERPL-MCNC: 36 MG/DL (ref 7–20)
CALCIUM SERPL-MCNC: 9 MG/DL (ref 8.3–10.6)
CHLORIDE SERPL-SCNC: 106 MMOL/L (ref 99–110)
CHOLEST SERPL-MCNC: 95 MG/DL (ref 0–199)
CO2 SERPL-SCNC: 22 MMOL/L (ref 21–32)
CREAT SERPL-MCNC: 1.3 MG/DL (ref 0.8–1.3)
CREAT UR-MCNC: 125.1 MG/DL (ref 39–259)
DEPRECATED RDW RBC AUTO: 15.6 % (ref 12.4–15.4)
GFR SERPLBLD CREATININE-BSD FMLA CKD-EPI: 54 ML/MIN/{1.73_M2}
GLUCOSE SERPL-MCNC: 62 MG/DL (ref 70–99)
HCT VFR BLD AUTO: 44.2 % (ref 40.5–52.5)
HDLC SERPL-MCNC: 47 MG/DL (ref 40–60)
HGB BLD-MCNC: 14.4 G/DL (ref 13.5–17.5)
LDLC SERPL CALC-MCNC: 35 MG/DL
MCH RBC QN AUTO: 29 PG (ref 26–34)
MCHC RBC AUTO-ENTMCNC: 32.6 G/DL (ref 31–36)
MCV RBC AUTO: 89.1 FL (ref 80–100)
MICROALBUMIN UR DL<=1MG/L-MCNC: 4.5 MG/DL
MICROALBUMIN/CREAT UR: 36 MG/G (ref 0–30)
PLATELET # BLD AUTO: 288 K/UL (ref 135–450)
PMV BLD AUTO: 8.4 FL (ref 5–10.5)
POTASSIUM SERPL-SCNC: 4.4 MMOL/L (ref 3.5–5.1)
PROT SERPL-MCNC: 7.7 G/DL (ref 6.4–8.2)
RBC # BLD AUTO: 4.95 M/UL (ref 4.2–5.9)
SODIUM SERPL-SCNC: 141 MMOL/L (ref 136–145)
TRIGL SERPL-MCNC: 63 MG/DL (ref 0–150)
TSH SERPL DL<=0.005 MIU/L-ACNC: 2.88 UIU/ML (ref 0.27–4.2)
VLDLC SERPL CALC-MCNC: 13 MG/DL
WBC # BLD AUTO: 9.5 K/UL (ref 4–11)

## 2024-07-10 PROCEDURE — 85027 COMPLETE CBC AUTOMATED: CPT

## 2024-07-10 PROCEDURE — 84443 ASSAY THYROID STIM HORMONE: CPT

## 2024-07-10 PROCEDURE — 80053 COMPREHEN METABOLIC PANEL: CPT

## 2024-07-10 PROCEDURE — 82570 ASSAY OF URINE CREATININE: CPT

## 2024-07-10 PROCEDURE — 82043 UR ALBUMIN QUANTITATIVE: CPT

## 2024-07-10 PROCEDURE — 80061 LIPID PANEL: CPT

## 2024-07-10 PROCEDURE — 83036 HEMOGLOBIN GLYCOSYLATED A1C: CPT

## 2024-07-11 LAB
EST. AVERAGE GLUCOSE BLD GHB EST-MCNC: 151.3 MG/DL
HBA1C MFR BLD: 6.9 %

## 2024-07-17 NOTE — PROGRESS NOTES
Alan Schwartz is a 83 y.o. male who presents for follow-up of Type 2 diabetes mellitus.       Current medication use: taking as prescribed (basal insulin, mealtime insulin, and Jardiance)  Eye exam current (within one year): no,   Exercise:  rarely, walking and \"hand squeezer\"  Do you eat balanced/healthy meals regularly? No   Do you limit your carbs (pasta/rice/bread/potatoes/sugar) Yes  Sees podiatrist: yes  Checks sugars at home:  2x/day  Hypoglycemia symptoms: No  AM Fastin this morning - usually 70-80's  Dinner - \"higher than morning but not very high\"        Checks BP at home: not as much as he should  BP numbers: normal  Taking medication daily: Yes (metoprolol)  Side effects of medication: no      Taking cholesterol medication regularly: taking as prescribed (rosuvastatin)  Side effects of medication: no      Has been having some left knee for past week.   States started after getting into son in law's new car, higher. No swelling, hasn't taken anything for it, using heating and helps. Hurts to get up and down, doesn't bother during sleep, doesn't bother once up and walking.     Body mass index is 23.78 kg/m².  Vitals:    24 0901   BP: 100/60   Site: Left Upper Arm   Position: Sitting   Cuff Size: Medium Adult   Pulse: 67   Temp: 97.4 °F (36.3 °C)   TempSrc: Infrared   SpO2: 97%   Weight: 73 kg (161 lb)     Wt Readings from Last 3 Encounters:   24 73 kg (161 lb)   24 73.9 kg (163 lb)   24 73 kg (160 lb 15 oz)       GENERAL:Alert and oriented x 4 NAD, affect appropriate and normal appearing weight, well hydrated, well developed.  LUNG:clear to auscultation bilaterally with normal respiratory effort and good air movement  CV: Normal heart sounds, regular rate and rhythm without murmurs    LE Edema: abnormal - 1+, patchy erythematous areas on legs  No swelling of knees, NT palpation, no redness    Slow to get up from table, has to rock a bit, little unsteady at first but then

## 2024-07-19 ENCOUNTER — OFFICE VISIT (OUTPATIENT)
Dept: FAMILY MEDICINE CLINIC | Age: 84
End: 2024-07-19

## 2024-07-19 VITALS
OXYGEN SATURATION: 97 % | DIASTOLIC BLOOD PRESSURE: 60 MMHG | WEIGHT: 161 LBS | HEART RATE: 67 BPM | SYSTOLIC BLOOD PRESSURE: 100 MMHG | TEMPERATURE: 97.4 F | BODY MASS INDEX: 23.78 KG/M2

## 2024-07-19 DIAGNOSIS — M25.562 ACUTE PAIN OF LEFT KNEE: ICD-10-CM

## 2024-07-19 DIAGNOSIS — E11.22 TYPE 2 DIABETES MELLITUS WITH STAGE 3B CHRONIC KIDNEY DISEASE, WITH LONG-TERM CURRENT USE OF INSULIN (HCC): Primary | ICD-10-CM

## 2024-07-19 DIAGNOSIS — N18.32 TYPE 2 DIABETES MELLITUS WITH STAGE 3B CHRONIC KIDNEY DISEASE, WITH LONG-TERM CURRENT USE OF INSULIN (HCC): Primary | ICD-10-CM

## 2024-07-19 DIAGNOSIS — E03.9 ACQUIRED HYPOTHYROIDISM: ICD-10-CM

## 2024-07-19 DIAGNOSIS — Z79.4 TYPE 2 DIABETES MELLITUS WITH STAGE 3B CHRONIC KIDNEY DISEASE, WITH LONG-TERM CURRENT USE OF INSULIN (HCC): Primary | ICD-10-CM

## 2024-07-19 DIAGNOSIS — E78.00 PURE HYPERCHOLESTEROLEMIA: ICD-10-CM

## 2024-07-19 DIAGNOSIS — R29.898 WEAKNESS OF BOTH LOWER EXTREMITIES: ICD-10-CM

## 2024-07-19 DIAGNOSIS — I10 PRIMARY HYPERTENSION: ICD-10-CM

## 2024-07-19 DIAGNOSIS — R27.0 ATAXIA: ICD-10-CM

## 2024-07-19 PROBLEM — E11.65 TYPE 2 DIABETES MELLITUS WITH HYPERGLYCEMIA, WITH LONG-TERM CURRENT USE OF INSULIN (HCC): Status: RESOLVED | Noted: 2023-06-01 | Resolved: 2024-07-19

## 2024-07-19 RX ORDER — INSULIN GLARGINE 100 [IU]/ML
22 INJECTION, SOLUTION SUBCUTANEOUS NIGHTLY
Qty: 5 ADJUSTABLE DOSE PRE-FILLED PEN SYRINGE | Refills: 3 | Status: SHIPPED | OUTPATIENT
Start: 2024-07-19

## 2024-07-19 NOTE — PATIENT INSTRUCTIONS
sturdy chair, holding on for balance. Lift one leg straight back without bending your knee or pointing your toes. Breathe in slowly.  Breathe out as you slowly bring your heel up toward your buttocks as far as possible. Bend only from your knee, and keep your hips still. The leg you are standing on should be slightly bent.  Hold position for 1 second.  Breathe in as you slowly lower your foot to the floor.  Repeat 10-15 times.  Repeat 10-15 times with other leg.  Repeat 10-15 more times with each leg.  Side Leg Raise   TARGETED MUSCLES: Hips, thighs, and buttocks  WHAT YOU NEED: Sturdy chair    This exercise strengthens hips, thighs, and buttocks. For an added challenge, you can modify the exercise to improve your balance.  Stand behind a sturdy chair with feet slightly apart, holding on for balance. Breathe in slowly.  Breathe out and slowly lift one leg out to the side. Keep your back straight and your toes facing forward. The leg you are standing on should be slightly bent.  Hold position for 1 second.  Breathe in as you slowly lower your leg.  Repeat 10-15 times.  Repeat 10-15 times with other leg.  Repeat 10-15 more times with each leg.  Back Leg Raise   TARGETED MUSCLES: Buttocks and lower back  WHAT YOU NEED: Sturdy chair    This exercise strengthens your buttocks and lower back. For an added challenge, you can modify the exercise to improve your balance.  Stand behind a sturdy chair, holding on for balance. Breathe in slowly.  Breathe out and slowly lift one leg straight back without bending your knee or pointing your toes. Try not to lean forward. The leg you are standing on should be slightly bent.  Hold position for 1 second.  Breathe in as you slowly lower your leg.  Repeat 10-15 times.  Repeat 10-15 times with other leg.  Repeat 10-15 more times with each leg.      --Dermatologists:      [facility]    [phone]   MD Linda Morejon MD Emily Fisher, MD Rachel

## 2024-07-29 DIAGNOSIS — E11.42 DM TYPE 2 WITH DIABETIC PERIPHERAL NEUROPATHY (HCC): ICD-10-CM

## 2024-07-29 DIAGNOSIS — E11.42 DM TYPE 2 WITH DIABETIC PERIPHERAL NEUROPATHY (HCC): Primary | ICD-10-CM

## 2024-07-29 RX ORDER — BLOOD-GLUCOSE METER
KIT MISCELLANEOUS
Qty: 100 STRIP | Refills: 3 | Status: SHIPPED | OUTPATIENT
Start: 2024-07-29

## 2024-07-29 RX ORDER — BLOOD-GLUCOSE METER
1 KIT MISCELLANEOUS 3 TIMES DAILY
Qty: 100 EACH | Refills: 5 | Status: SHIPPED | OUTPATIENT
Start: 2024-07-29

## 2024-07-29 NOTE — TELEPHONE ENCOUNTER
blood glucose test strips (FREESTYLE LITE) strip [[id number]]     Per pharmacy they are in need of a frequency for the test strips. Requesting a new script be sent with this information.       [facility] [id number] - [place], [place] - [address] - P [phone] - F [phone]  [address] [facility] [address]  Phone: [phone]  Fax: [phone]

## 2024-07-29 NOTE — TELEPHONE ENCOUNTER
Medication:   Requested Prescriptions      No prescriptions requested or ordered in this encounter          Patient Phone Number: [phone] (home)     Last appt: 7/19/2024   Next appt: 10/25/2024    Last OARRS:        No data to display              PDMP Monitoring:    Last PDMP Guzman as Reviewed ([place]):  Review User Review Instant Review Result          Preferred Pharmacy:   [facility] [id number] - [place], [place] - [address] [phone] - F [phone]  [address] [place] [address]  Phone: [phone] Fax: [phone]

## 2024-08-08 ENCOUNTER — TELEPHONE (OUTPATIENT)
Dept: FAMILY MEDICINE CLINIC | Age: 84
End: 2024-08-08

## 2024-08-08 RX ORDER — ROSUVASTATIN CALCIUM 5 MG/1
5 TABLET, COATED ORAL DAILY
Qty: 90 TABLET | Refills: 1 | Status: SHIPPED | OUTPATIENT
Start: 2024-08-08

## 2024-08-08 NOTE — TELEPHONE ENCOUNTER
rosuvastatin (CRESTOR) 5 MG tablet     [facility] [id number] - [place] [address] [address] [phone] - F [phone]     NEXT APPT:8/29

## 2024-08-09 ENCOUNTER — TELEPHONE (OUTPATIENT)
Dept: FAMILY MEDICINE CLINIC | Age: 84
End: 2024-08-09

## 2024-08-09 DIAGNOSIS — R29.898 WEAKNESS OF BOTH LOWER EXTREMITIES: ICD-10-CM

## 2024-08-09 DIAGNOSIS — R29.6 FALLING: ICD-10-CM

## 2024-08-09 DIAGNOSIS — G20.A1 PARKINSON'S DISEASE, UNSPECIFIED WHETHER DYSKINESIA PRESENT, UNSPECIFIED WHETHER MANIFESTATIONS FLUCTUATE (HCC): Primary | ICD-10-CM

## 2024-08-09 NOTE — TELEPHONE ENCOUNTER
Pt daughter called requesting an order or referral for physical and occupational therapy due to dad having parkinson's and has falling a few times. Pt daughter is on his HIPAA form and would like a call back.     Please advise...

## 2024-08-22 ENCOUNTER — HOSPITAL ENCOUNTER (OUTPATIENT)
Dept: PHYSICAL THERAPY | Age: 84
Setting detail: THERAPIES SERIES
Discharge: HOME | End: 2024-08-22
Attending: FAMILY MEDICINE
Payer: MEDICARE

## 2024-08-22 DIAGNOSIS — G20.A1 PARKINSON'S DISEASE, UNSPECIFIED WHETHER DYSKINESIA PRESENT, UNSPECIFIED WHETHER MANIFESTATIONS FLUCTUATE (HCC): ICD-10-CM

## 2024-08-22 DIAGNOSIS — M25.562 ACUTE PAIN OF LEFT KNEE: ICD-10-CM

## 2024-08-22 DIAGNOSIS — Z74.09 DECREASED FUNCTIONAL MOBILITY AND ENDURANCE: ICD-10-CM

## 2024-08-22 DIAGNOSIS — R26.89 IMBALANCE: ICD-10-CM

## 2024-08-22 DIAGNOSIS — R68.89 DECREASED FUNCTIONAL ACTIVITY TOLERANCE: Primary | ICD-10-CM

## 2024-08-22 PROCEDURE — 97161 PT EVAL LOW COMPLEX 20 MIN: CPT

## 2024-08-22 PROCEDURE — 97530 THERAPEUTIC ACTIVITIES: CPT

## 2024-08-22 NOTE — PLAN OF CARE
Functional goals/ Treatment Progress Update:  [] Patient is progressing as expected towards functional goals listed.    [] Progression is slowed due to complexities/Impairments listed.  [] Progression has been slowed due to co-morbidities.  [x] Plan just implemented, too soon (<30days) to assess goals progression   [] Goals require adjustment due to lack of progress  [] Patient is not progressing as expected and requires additional follow up with physician  [] Other:     TREATMENT PLAN     Frequency/Duration: 2x/week for 4-6 weeks for the following treatment interventions:    Interventions:  Therapeutic Exercise (23149) including: strength training, ROM, and functional mobility  Therapeutic Activities (30036) including: functional mobility training and education.  Neuromuscular Re-education (98733) activation and proprioception, including postural re-education.    Gait Training (82509) for normalization of ambulation patterns and AD training.   Manual Therapy (62087) as indicated to include: Passive Range of Motion, Gr I-IV mobilizations, and Soft Tissue Mobilization  Modalities as needed that may include: Thermal Agents  Patient education on joint protection, postural re-education, activity modification, and progression of HEP    Plan: POC initiated as per evaluation    Electronically Signed by Bere Nicolas, PT, DPT, OMT-C  [id number]  Date: 08/22/2024    Kelli Carrillo, SPT  Therapist was present, directed the patient's care, made skilled judgement, and was responsible for assessment and treatment of the patient.         Note: Portions of this note have been templated and/or copied from initial evaluation, reassessments and prior notes for documentation efficiency.    Note: If patient does not return for scheduled/recommended follow up visits, this note will serve as a discharge from care along with the most recent update on progress.

## 2024-08-28 ENCOUNTER — HOSPITAL ENCOUNTER (OUTPATIENT)
Dept: PHYSICAL THERAPY | Age: 84
Setting detail: THERAPIES SERIES
Discharge: HOME | End: 2024-08-28
Attending: FAMILY MEDICINE
Payer: MEDICARE

## 2024-08-28 PROCEDURE — 97112 NEUROMUSCULAR REEDUCATION: CPT

## 2024-08-28 NOTE — FLOWSHEET NOTE
Treatment Minutes 40        Charge Justification:  (59727) NEUROMUSCULAR RE-EDUCATION - Therapeutic procedure, 1 or more areas, each 15 minutes; neuromuscular reeducation of movement, balance, coordination, kinesthetic sense, posture, and/or proprioception for sitting and/or standing activities    GOALS     Patient stated goal: improve balance, be able to use knee without pain  [] Progressing: [] Met: [] Not Met: [] Adjusted    Therapist goals for Patient:   Short Term Goals: To be achieved in: 2 weeks  1. Independent in HEP and progression per patient tolerance, in order to prevent re-injury.   [] Progressing: [] Met: [] Not Met: [] Adjusted  2. Patient will have a decrease in pain to <1/10 to facilitate improvement in movement, function, and ADLs as indicated by Functional Deficits.  [] Progressing: [] Met: [] Not Met: [] Adjusted    Long Term Goals: To be achieved in: 4 weeks  1.  Score of 60% or more confidence for the ABC Scale to assist with reaching prior level of function with activities such as transferring from his chair at home without difficulty.  [] Progressing: [] Met: [] Not Met: [] Adjusted  2. Patient will demonstrate decreased speed needed to complete TUG to enable patient to walk short distances around the house without moments of imbalance.   [] Progressing: [] Met: [] Not Met: [] Adjusted  3. Patient will demonstrate increased Strength of ankle DF ad PF to at least 5/5 throughout without pain to allow for proper functional mobility to enable patient to return to walking longer distances.   [] Progressing: [] Met: [] Not Met: [] Adjusted  4. Patient will return to getting up from [facility]blood at home without increased symptoms, imbalance, or restriction.   [] Progressing: [] Met: [] Not Met: [] Adjusted  5. Pt will be able to walk longer distances at home without experiencing any fatigue or episodes of imbalance.    [] Progressing: [] Met: [] Not Met: [] Adjusted     Overall Progression Towards  Functional goals/ Treatment Progress Update:  [] Patient is progressing as expected towards functional goals listed.    [] Progression is slowed due to complexities/Impairments listed.  [] Progression has been slowed due to co-morbidities.  [x] Plan just implemented, too soon (<30days) to assess goals progression   [] Goals require adjustment due to lack of progress  [] Patient is not progressing as expected and requires additional follow up with physician  [] Other:     TREATMENT PLAN     Frequency/Duration: 2x/week for 4-6 weeks for the following treatment interventions:    Interventions:  Therapeutic Exercise (89973) including: strength training, ROM, and functional mobility  Therapeutic Activities (98595) including: functional mobility training and education.  Neuromuscular Re-education (21331) activation and proprioception, including postural re-education.    Gait Training (12998) for normalization of ambulation patterns and AD training.   Manual Therapy (79236) as indicated to include: Passive Range of Motion, Gr I-IV mobilizations, and Soft Tissue Mobilization  Modalities as needed that may include: Thermal Agents  Patient education on joint protection, postural re-education, activity modification, and progression of HEP    Plan:  Continue to address reactive stepping, ambulation on uneven surfaces, reducing reliance on AD.    Electronically Signed by Dorinda Caceres PT, DPT  Date: 08/28/2024       Note: Portions of this note have been templated and/or copied from initial evaluation, reassessments and prior notes for documentation efficiency.    Note: If patient does not return for scheduled/recommended follow up visits, this note will serve as a discharge from care along with the most recent update on progress.

## 2024-08-29 ENCOUNTER — OFFICE VISIT (OUTPATIENT)
Dept: NEUROLOGY | Age: 84
End: 2024-08-29
Payer: MEDICARE

## 2024-08-29 VITALS
DIASTOLIC BLOOD PRESSURE: 62 MMHG | SYSTOLIC BLOOD PRESSURE: 133 MMHG | WEIGHT: 161 LBS | BODY MASS INDEX: 23.85 KG/M2 | HEIGHT: 69 IN | HEART RATE: 63 BPM

## 2024-08-29 DIAGNOSIS — R27.0 ATAXIA: ICD-10-CM

## 2024-08-29 DIAGNOSIS — G20.B1 PARKINSON'S DISEASE WITH DYSKINESIA WITHOUT FLUCTUATING MANIFESTATIONS (HCC): Primary | ICD-10-CM

## 2024-08-29 DIAGNOSIS — E11.42 DIABETIC PERIPHERAL NEUROPATHY (HCC): ICD-10-CM

## 2024-08-29 PROCEDURE — 1123F ACP DISCUSS/DSCN MKR DOCD: CPT | Performed by: PSYCHIATRY & NEUROLOGY

## 2024-08-29 PROCEDURE — 1036F TOBACCO NON-USER: CPT | Performed by: PSYCHIATRY & NEUROLOGY

## 2024-08-29 PROCEDURE — G8420 CALC BMI NORM PARAMETERS: HCPCS | Performed by: PSYCHIATRY & NEUROLOGY

## 2024-08-29 PROCEDURE — 3078F DIAST BP <80 MM HG: CPT | Performed by: PSYCHIATRY & NEUROLOGY

## 2024-08-29 PROCEDURE — G8427 DOCREV CUR MEDS BY ELIG CLIN: HCPCS | Performed by: PSYCHIATRY & NEUROLOGY

## 2024-08-29 PROCEDURE — 3075F SYST BP GE 130 - 139MM HG: CPT | Performed by: PSYCHIATRY & NEUROLOGY

## 2024-08-29 PROCEDURE — 99214 OFFICE O/P EST MOD 30 MIN: CPT | Performed by: PSYCHIATRY & NEUROLOGY

## 2024-08-29 PROCEDURE — 3044F HG A1C LEVEL LT 7.0%: CPT | Performed by: PSYCHIATRY & NEUROLOGY

## 2024-08-29 RX ORDER — CARBIDOPA AND LEVODOPA 25; 100 MG/1; MG/1
TABLET ORAL
Qty: 420 TABLET | Refills: 1 | Status: SHIPPED | OUTPATIENT
Start: 2024-08-29

## 2024-08-29 NOTE — PROGRESS NOTES
The same today  Coordination: Bilateral extremity tremors, mild.jaw tremor  DTR: symmetric 2 in UL and decreased LL  Sensation: normal to all modalities in both arms and legs.  Gait/Posture: Unsteady gait, poor postural reflexes.,    ROS : A 10-14 system review of constitutional, cardiovascular, respiratory, GI, eyes, , ENT, musculoskeletal, endocrine, hematological, skin, SHEENT, genitourinary, psychiatric and neurologic systems was obtained and updated today which is unremarkable except as mentioned in my HPI      Medical decision making:    A. Problems (any 1)    High:    [] Acute/Chronic Illness/injury posing threat to life or bodily function:    [] Severe exacerbation of chronic illness:      Moderate:    [x]     1 or more chronic illness with exacerbation, progression or side effect of treatment or  [x]     2 or more stable chronic illnesses or  []     1 acute illness with systemic symptoms       Mild:  []     1 stable chronic illness     ---------------------------------------------------------------------  B. Risk of Treatment (any 1)   [] Drugs/treatments that require intensive monitoring for toxicity include:      [x] Prescription drug management  ----------------------------------------------------------------------  [] High (any 2)  [x] Moderate (any 1)    C. Data (any 2 for high and any 1 for moderate)  [] Discussed management of the case with:    [] Imaging personally reviewed and interpreted, includes:    [x] Data Review (any 3)  [x] Collateral history obtained from:    [x] All available Consultant notes were reviewed  [x] All current labs were reviewed and interpreted for clinical significance   [x] Appropriate follow-up test and labs were ordered    I personally reviewed and updated social history, past medical history, medications, allergy, surgical history, and family history as documented in the patient's electronic health records.   Provided patient education regarding risk, benefits and  treatment options as well as adherence to medication regimen and side effect from these medications.           Assessment:   Diagnosis Orders   1. 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 Ataxia        3. Diabetic peripheral neuropathy (HCC)              Continue Sinemet 25/100, 1.5 tablet 3 times daily.    6-month refill  PT and OT for gait training  Should use his walker at all time  Diabetic control  Watch A1c  Insulin sliding scale  Aspirin  Statin  Continue Lopressor 25 mg daily and blood pressure diary  Watch for orthostatic hypotension  Risk of falling addressed in details  6-month follow-up

## 2024-08-29 NOTE — PATIENT INSTRUCTIONS

## 2024-09-04 ENCOUNTER — HOSPITAL ENCOUNTER (OUTPATIENT)
Dept: PHYSICAL THERAPY | Age: 84
Setting detail: THERAPIES SERIES
Discharge: HOME | End: 2024-09-04
Attending: FAMILY MEDICINE
Payer: MEDICARE

## 2024-09-04 PROCEDURE — 97112 NEUROMUSCULAR REEDUCATION: CPT

## 2024-09-04 PROCEDURE — 97530 THERAPEUTIC ACTIVITIES: CPT

## 2024-09-04 NOTE — FLOWSHEET NOTE
slowed due to complexities/Impairments listed.  [] Progression has been slowed due to co-morbidities.  [x] Plan just implemented, too soon (<30days) to assess goals progression   [] Goals require adjustment due to lack of progress  [] Patient is not progressing as expected and requires additional follow up with physician  [] Other:     TREATMENT PLAN     Frequency/Duration: 2x/week for 4-6 weeks for the following treatment interventions:    Interventions:  Therapeutic Exercise (32521) including: strength training, ROM, and functional mobility  Therapeutic Activities (56467) including: functional mobility training and education.  Neuromuscular Re-education (95283) activation and proprioception, including postural re-education.    Gait Training (98594) for normalization of ambulation patterns and AD training.   Manual Therapy (18679) as indicated to include: Passive Range of Motion, Gr I-IV mobilizations, and Soft Tissue Mobilization  Modalities as needed that may include: Thermal Agents  Patient education on joint protection, postural re-education, activity modification, and progression of HEP    Plan:  Continue to address reactive stepping, ambulation on uneven surfaces, reducing reliance on AD.      Electronically Signed by Melo Fishman PT, DPT  Date: 09/04/2024       Note: Portions of this note have been templated and/or copied from initial evaluation, reassessments and prior notes for documentation efficiency.    Note: If patient does not return for scheduled/recommended follow up visits, this note will serve as a discharge from care along with the most recent update on progress.

## 2024-09-06 ENCOUNTER — HOSPITAL ENCOUNTER (OUTPATIENT)
Dept: PHYSICAL THERAPY | Age: 84
Setting detail: THERAPIES SERIES
Discharge: HOME | End: 2024-09-06
Attending: FAMILY MEDICINE
Payer: MEDICARE

## 2024-09-06 PROCEDURE — 97530 THERAPEUTIC ACTIVITIES: CPT

## 2024-09-06 PROCEDURE — 97112 NEUROMUSCULAR REEDUCATION: CPT

## 2024-09-06 NOTE — FLOWSHEET NOTE
Treatment Minutes 38        Charge Justification:  (13388) NEUROMUSCULAR RE-EDUCATION - Therapeutic procedure, 1 or more areas, each 15 minutes; neuromuscular reeducation of movement, balance, coordination, kinesthetic sense, posture, and/or proprioception for sitting and/or standing activities  (09840) THERAPEUTIC ACTIVITY - use of dynamic activities to improve functional performance. (Ex include squatting, ascending/descending stairs, walking, bending, lifting, catching, throwing, pushing, pulling, jumping.)  Direct, one on one contact, billed in 15-minute increments.    GOALS     Patient stated goal: improve balance, be able to use knee without pain  [] Progressing: [] Met: [] Not Met: [] Adjusted    Therapist goals for Patient:   Short Term Goals: To be achieved in: 2 weeks  1. Independent in HEP and progression per patient tolerance, in order to prevent re-injury.   [] Progressing: [] Met: [] Not Met: [] Adjusted  2. Patient will have a decrease in pain to <1/10 to facilitate improvement in movement, function, and ADLs as indicated by Functional Deficits.  [] Progressing: [] Met: [] Not Met: [] Adjusted    Long Term Goals: To be achieved in: 4 weeks  1.  Score of 60% or more confidence for the ABC Scale to assist with reaching prior level of function with activities such as transferring from his chair at home without difficulty.  [] Progressing: [] Met: [] Not Met: [] Adjusted  2. Patient will demonstrate decreased speed needed to complete TUG to enable patient to walk short distances around the house without moments of imbalance.   [] Progressing: [] Met: [] Not Met: [] Adjusted  3. Patient will demonstrate increased Strength of ankle DF ad PF to at least 5/5 throughout without pain to allow for proper functional mobility to enable patient to return to walking longer distances.   [] Progressing: [] Met: [] Not Met: [] Adjusted  4. Patient will return to getting up from youngblood at home without increased symptoms,

## 2024-09-09 ENCOUNTER — HOSPITAL ENCOUNTER (OUTPATIENT)
Dept: PHYSICAL THERAPY | Age: 84
Setting detail: THERAPIES SERIES
Discharge: HOME | End: 2024-09-09
Attending: FAMILY MEDICINE
Payer: MEDICARE

## 2024-09-09 PROCEDURE — 97530 THERAPEUTIC ACTIVITIES: CPT

## 2024-09-09 PROCEDURE — 97112 NEUROMUSCULAR REEDUCATION: CPT

## 2024-09-11 ENCOUNTER — HOSPITAL ENCOUNTER (OUTPATIENT)
Dept: PHYSICAL THERAPY | Age: 84
Setting detail: THERAPIES SERIES
Discharge: HOME | End: 2024-09-11
Attending: FAMILY MEDICINE
Payer: MEDICARE

## 2024-09-11 PROCEDURE — 97530 THERAPEUTIC ACTIVITIES: CPT

## 2024-09-11 PROCEDURE — 97112 NEUROMUSCULAR REEDUCATION: CPT

## 2024-09-11 PROCEDURE — 97110 THERAPEUTIC EXERCISES: CPT

## 2024-09-16 ENCOUNTER — HOSPITAL ENCOUNTER (OUTPATIENT)
Dept: PHYSICAL THERAPY | Age: 84
Setting detail: THERAPIES SERIES
Discharge: HOME | End: 2024-09-16
Attending: FAMILY MEDICINE
Payer: MEDICARE

## 2024-09-16 PROCEDURE — 97530 THERAPEUTIC ACTIVITIES: CPT

## 2024-09-16 PROCEDURE — 97110 THERAPEUTIC EXERCISES: CPT

## 2024-09-18 ENCOUNTER — HOSPITAL ENCOUNTER (OUTPATIENT)
Dept: PHYSICAL THERAPY | Age: 84
Setting detail: THERAPIES SERIES
Discharge: HOME | End: 2024-09-18
Attending: FAMILY MEDICINE
Payer: MEDICARE

## 2024-09-18 PROCEDURE — 97110 THERAPEUTIC EXERCISES: CPT

## 2024-09-18 PROCEDURE — 97112 NEUROMUSCULAR REEDUCATION: CPT

## 2024-09-25 ENCOUNTER — HOSPITAL ENCOUNTER (OUTPATIENT)
Dept: PHYSICAL THERAPY | Age: 84
Setting detail: THERAPIES SERIES
Discharge: HOME | End: 2024-09-25
Attending: FAMILY MEDICINE
Payer: MEDICARE

## 2024-09-25 PROCEDURE — 97530 THERAPEUTIC ACTIVITIES: CPT

## 2024-09-25 PROCEDURE — 97110 THERAPEUTIC EXERCISES: CPT

## 2024-09-27 ENCOUNTER — HOSPITAL ENCOUNTER (OUTPATIENT)
Dept: PHYSICAL THERAPY | Age: 84
Setting detail: THERAPIES SERIES
Discharge: HOME | End: 2024-09-27
Attending: FAMILY MEDICINE
Payer: MEDICARE

## 2024-09-27 PROCEDURE — 97112 NEUROMUSCULAR REEDUCATION: CPT

## 2024-09-30 ENCOUNTER — HOSPITAL ENCOUNTER (OUTPATIENT)
Dept: PHYSICAL THERAPY | Age: 84
Setting detail: THERAPIES SERIES
Discharge: HOME | End: 2024-09-30
Attending: FAMILY MEDICINE
Payer: MEDICARE

## 2024-09-30 PROCEDURE — 97530 THERAPEUTIC ACTIVITIES: CPT

## 2024-09-30 PROCEDURE — 97112 NEUROMUSCULAR REEDUCATION: CPT

## 2024-09-30 NOTE — FLOWSHEET NOTE
Ultrasound (55791)    Group Therapy (43411)     Estim Attended (31648)    Canalith Repositioning (50992)     Other:    Other:    Total Timed Code Tx Minutes 40 3       Total Treatment Minutes 40        Charge Justification:  (81170) NEUROMUSCULAR RE-EDUCATION - Therapeutic procedure, 1 or more areas, each 15 minutes; neuromuscular reeducation of movement, balance, coordination, kinesthetic sense, posture, and/or proprioception for sitting and/or standing activities  (41826) THERAPEUTIC ACTIVITY - use of dynamic activities to improve functional performance. (Ex include squatting, ascending/descending stairs, walking, bending, lifting, catching, throwing, pushing, pulling, jumping.)  Direct, one on one contact, billed in 15-minute increments.    GOALS     Patient stated goal: improve balance, be able to use knee without pain  [] Progressing: [] Met: [] Not Met: [] Adjusted    Therapist goals for Patient:   Short Term Goals: To be achieved in: 2 weeks  1. Independent in HEP and progression per patient tolerance, in order to prevent re-injury.   [] Progressing: [x] Met: [] Not Met: [] Adjusted  2. Patient will have a decrease in pain to <1/10 to facilitate improvement in movement, function, and ADLs as indicated by Functional Deficits.  [] Progressing: [x] Met: [] Not Met: [] Adjusted    Long Term Goals: To be achieved in: 4 weeks  1.  Score of 60% or more confidence for the ABC Scale to assist with reaching prior level of function with activities such as transferring from his chair at home without difficulty.  [] Progressing: [x] Met: [] Not Met: [] Adjusted  2. Patient will demonstrate decreased speed needed to complete TUG to enable patient to walk short distances around the house without moments of imbalance.   [] Progressing: [] Met: [] Not Met: [] Adjusted  3. Patient will demonstrate increased Strength of ankle DF ad PF to at least 5/5 throughout without pain to allow for proper functional mobility to enable

## 2024-10-02 ENCOUNTER — HOSPITAL ENCOUNTER (OUTPATIENT)
Dept: PHYSICAL THERAPY | Age: 84
Setting detail: THERAPIES SERIES
Discharge: HOME | End: 2024-10-02
Attending: FAMILY MEDICINE
Payer: MEDICARE

## 2024-10-02 PROCEDURE — 97530 THERAPEUTIC ACTIVITIES: CPT

## 2024-10-02 PROCEDURE — 97110 THERAPEUTIC EXERCISES: CPT

## 2024-10-02 NOTE — PLAN OF CARE
[facility] - Outpatient Rehabilitation and Therapy [address], [place] [address] office: [phone] fax: [phone]       Physical Therapy Re-Certification Plan of Care     Dear Regina, Madison MARTINEZ MD  ,    We had the pleasure of treating the following patient for physical therapy services at [facility]. A summary of our findings can be found in the updated assessment below.  This includes our plan of care.  If you have any questions or concerns regarding these findings, please do not hesitate to contact me at the office phone number checked above.  Thank you for the referral.     Physician Signature:________________________________Date:__________________  By signing above (or electronic signature), therapist's plan is approved by physician      Functional Outcome:    Test used Initial score  8/22/24 9/25/24 10/02/2024   Pain Summary VAS 0/10 in knee   4/10 when standing 0/10 when standing 4-5/10 L knee   Functional questionnaire ABC Scale 37.5% 61% 63.44%   Other: TUG 29\" - 23\" w/4WW    6MWT - - 560' w/4WW       TUG w/4WW:  23\"   --increased time required to stand initially and then to turn around to sit  6MWT w/4WW: 560'  --R foot slap increased and B Trendelenburg deviations increased as distance/fatigue progressed  Seated ankle AROM: B PF WFL;  B DF to neutral only    Assessment:  Alan Schwartz 1940 continues to present with functional deficits in endurance of strength, cardiovascular endurance, eccentric control, and muscle activation  limiting ability with walking on even ground, managing community ambulation, walking up/down stairs, and light home activity .  During therapy this date, patient required modification of technique for improving proper muscle recruitment and activation/motor control patterns and improving activity tolerance . Patient will continue to benefit from ongoing evaluation and advanced clinical decision from a Physical

## 2024-10-09 ENCOUNTER — HOSPITAL ENCOUNTER (OUTPATIENT)
Dept: PHYSICAL THERAPY | Age: 84
Setting detail: THERAPIES SERIES
Discharge: HOME | End: 2024-10-09
Attending: FAMILY MEDICINE
Payer: MEDICARE

## 2024-10-09 PROCEDURE — 97530 THERAPEUTIC ACTIVITIES: CPT

## 2024-10-09 PROCEDURE — 97110 THERAPEUTIC EXERCISES: CPT

## 2024-10-09 NOTE — FLOWSHEET NOTE
[facility] - Outpatient Rehabilitation and Therapy [address], [place] [address] office: [phone] fax: [phone]           Physical Therapy: TREATMENT/PROGRESS NOTE   Patient: Alan Schwartz (84 y.o. male)   Examination Date: 10/09/2024   :  1940 MRN: [id number]   Visit #:   Insurance Allowable Auth Needed   Medical necessity []Yes    [x]No    Insurance: Payor: MEDICARE / Plan: MEDICARE PART A AND B / Product Type: *No Product type* /   Insurance ID: [id number] - (Medicare)  Secondary Insurance (if applicable): HUMANA   Treatment Diagnosis:     ICD-10-CM    1. Decreased functional activity tolerance  R68.89       2. Decreased functional mobility and endurance  Z74.09       3. Imbalance  R26.89       4. Parkinson's disease, unspecified whether dyskinesia present, unspecified whether manifestations fluctuate (HCC)  G20.A1       5. Acute pain of left knee  M25.562          Medical Diagnosis:  Parkinson's disease, unspecified whether dyskinesia present, unspecified whether manifestations fluctuate (HCC) [G20.A1]  Weakness of both lower extremities [R29.898]  Falling [R29.6]   Referring Physician: Madison Tapia MD  PCP: Madison Tapia MD     Plan of care signed (Y/N): Y    Date of Patient follow up with Physician:      Plan of Care Report: NO  POC update due: (10 visits /OR AUTH LIMITS, whichever is less)  10/31/2024                                             Medical History:  Comorbidities:  Diabetes (Type I or II)  Hypertension  Parkinsons Disease  COVID-19  Relevant Medical History:                                          Precautions/ Contra-indications:           Latex allergy:  NO  Pacemaker:    NO  Contraindications for Manipulation: NA  Date of Surgery:   Other:    Red Flags:  None    Suicide Screening:   The patient did not verbalize a primary behavioral concern, suicidal ideation, suicidal intent, or demonstrate suicidal behaviors.    Preferred Language

## 2024-10-11 ENCOUNTER — HOSPITAL ENCOUNTER (OUTPATIENT)
Dept: PHYSICAL THERAPY | Age: 84
Setting detail: THERAPIES SERIES
Discharge: HOME | End: 2024-10-11
Attending: FAMILY MEDICINE
Payer: MEDICARE

## 2024-10-11 PROCEDURE — 97112 NEUROMUSCULAR REEDUCATION: CPT

## 2024-10-11 PROCEDURE — 97530 THERAPEUTIC ACTIVITIES: CPT

## 2024-10-11 NOTE — FLOWSHEET NOTE
[facility] - Outpatient Rehabilitation and Therapy [address], [place] [address] office: [phone] fax: [phone]           Physical Therapy: TREATMENT/PROGRESS NOTE   Patient: Alan Schwartz (84 y.o. male)   Examination Date: 10/11/2024   :  1940 MRN: [id number]   Visit #:   Insurance Allowable Auth Needed   Medical necessity []Yes    [x]No    Insurance: Payor: MEDICARE / Plan: MEDICARE PART A AND B / Product Type: *No Product type* /   Insurance ID: [id number] - (Medicare)  Secondary Insurance (if applicable): HUMANA   Treatment Diagnosis:     ICD-10-CM    1. Decreased functional activity tolerance  R68.89       2. Decreased functional mobility and endurance  Z74.09       3. Imbalance  R26.89       4. Parkinson's disease, unspecified whether dyskinesia present, unspecified whether manifestations fluctuate (HCC)  G20.A1       5. Acute pain of left knee  M25.562          Medical Diagnosis:  Parkinson's disease, unspecified whether dyskinesia present, unspecified whether manifestations fluctuate (HCC) [G20.A1]  Weakness of both lower extremities [R29.898]  Falling [R29.6]   Referring Physician: Madison Tapia MD  PCP: Madison Tapia MD     Plan of care signed (Y/N): Y    Date of Patient follow up with Physician:      Plan of Care Report: NO  POC update due: (10 visits /OR AUTH LIMITS, whichever is less)  10/31/2024                                             Medical History:  Comorbidities:  Diabetes (Type I or II)  Hypertension  Parkinsons Disease  COVID-19  Relevant Medical History:                                          Precautions/ Contra-indications:           Latex allergy:  NO  Pacemaker:    NO  Contraindications for Manipulation: NA  Date of Surgery:   Other:    Red Flags:  None    Suicide Screening:   The patient did not verbalize a primary behavioral concern, suicidal ideation, suicidal intent, or demonstrate suicidal behaviors.    Preferred Language

## 2024-10-16 ENCOUNTER — HOSPITAL ENCOUNTER (OUTPATIENT)
Dept: PHYSICAL THERAPY | Age: 84
Setting detail: THERAPIES SERIES
Discharge: HOME | End: 2024-10-16
Attending: FAMILY MEDICINE
Payer: MEDICARE

## 2024-10-16 PROCEDURE — 97110 THERAPEUTIC EXERCISES: CPT

## 2024-10-16 PROCEDURE — 97112 NEUROMUSCULAR REEDUCATION: CPT

## 2024-10-16 NOTE — FLOWSHEET NOTE
[facility] - Outpatient Rehabilitation and Therapy [address], [place] [address] office: [phone] fax: [phone]           Physical Therapy: TREATMENT/PROGRESS NOTE   Patient: Alan Schwartz (84 y.o. male)   Examination Date: 10/16/2024   :  1940 MRN: [id number]   Visit #: 15 /20  Insurance Allowable Auth Needed   Medical necessity []Yes    [x]No    Insurance: Payor: MEDICARE / Plan: MEDICARE PART A AND B / Product Type: *No Product type* /   Insurance ID: [id number] - (Medicare)  Secondary Insurance (if applicable): HUMANA   Treatment Diagnosis:     ICD-10-CM    1. Decreased functional activity tolerance  R68.89       2. Decreased functional mobility and endurance  Z74.09       3. Imbalance  R26.89       4. Parkinson's disease, unspecified whether dyskinesia present, unspecified whether manifestations fluctuate (HCC)  G20.A1       5. Acute pain of left knee  M25.562          Medical Diagnosis:  Parkinson's disease, unspecified whether dyskinesia present, unspecified whether manifestations fluctuate (HCC) [G20.A1]  Weakness of both lower extremities [R29.898]  Falling [R29.6]   Referring Physician: Madison Tapia MD  PCP: Madison Tapia MD     Plan of care signed (Y/N): Y    Date of Patient follow up with Physician:      Plan of Care Report: NO  POC update due: (10 visits /OR AUTH LIMITS, whichever is less)  10/31/2024                                             Medical History:  Comorbidities:  Diabetes (Type I or II)  Hypertension  Parkinsons Disease  COVID-19  Relevant Medical History:                                          Precautions/ Contra-indications:           Latex allergy:  NO  Pacemaker:    NO  Contraindications for Manipulation: NA  Date of Surgery:   Other:    Red Flags:  None    Suicide Screening:   The patient did not verbalize a primary behavioral concern, suicidal ideation, suicidal intent, or demonstrate suicidal behaviors.    Preferred Language

## 2024-10-18 ENCOUNTER — HOSPITAL ENCOUNTER (OUTPATIENT)
Age: 84
Discharge: HOME | End: 2024-10-18
Payer: MEDICARE

## 2024-10-18 ENCOUNTER — HOSPITAL ENCOUNTER (OUTPATIENT)
Dept: PHYSICAL THERAPY | Age: 84
Setting detail: THERAPIES SERIES
Discharge: HOME | End: 2024-10-18
Attending: FAMILY MEDICINE
Payer: MEDICARE

## 2024-10-18 DIAGNOSIS — E11.22 TYPE 2 DIABETES MELLITUS WITH STAGE 3B CHRONIC KIDNEY DISEASE, WITH LONG-TERM CURRENT USE OF INSULIN (HCC): ICD-10-CM

## 2024-10-18 DIAGNOSIS — Z79.4 TYPE 2 DIABETES MELLITUS WITH STAGE 3B CHRONIC KIDNEY DISEASE, WITH LONG-TERM CURRENT USE OF INSULIN (HCC): ICD-10-CM

## 2024-10-18 DIAGNOSIS — N18.32 TYPE 2 DIABETES MELLITUS WITH STAGE 3B CHRONIC KIDNEY DISEASE, WITH LONG-TERM CURRENT USE OF INSULIN (HCC): ICD-10-CM

## 2024-10-18 LAB
ANION GAP SERPL CALCULATED.3IONS-SCNC: 15 MMOL/L (ref 3–16)
BUN SERPL-MCNC: 50 MG/DL (ref 7–20)
CALCIUM SERPL-MCNC: 9.2 MG/DL (ref 8.3–10.6)
CHLORIDE SERPL-SCNC: 103 MMOL/L (ref 99–110)
CO2 SERPL-SCNC: 20 MMOL/L (ref 21–32)
CREAT SERPL-MCNC: 1.6 MG/DL (ref 0.8–1.3)
GFR SERPLBLD CREATININE-BSD FMLA CKD-EPI: 42 ML/MIN/{1.73_M2}
GLUCOSE SERPL-MCNC: 123 MG/DL (ref 70–99)
POTASSIUM SERPL-SCNC: 4.8 MMOL/L (ref 3.5–5.1)
SODIUM SERPL-SCNC: 138 MMOL/L (ref 136–145)

## 2024-10-18 PROCEDURE — 36415 COLL VENOUS BLD VENIPUNCTURE: CPT

## 2024-10-18 PROCEDURE — 97530 THERAPEUTIC ACTIVITIES: CPT

## 2024-10-18 PROCEDURE — 97110 THERAPEUTIC EXERCISES: CPT

## 2024-10-18 PROCEDURE — 97112 NEUROMUSCULAR REEDUCATION: CPT

## 2024-10-18 PROCEDURE — 83036 HEMOGLOBIN GLYCOSYLATED A1C: CPT

## 2024-10-18 PROCEDURE — 80048 BASIC METABOLIC PNL TOTAL CA: CPT

## 2024-10-18 NOTE — FLOWSHEET NOTE
bending, lifting, catching, throwing, pushing, pulling, jumping.)  Direct, one on one contact, billed in 15-minute increments.    GOALS     Patient stated goal: improve balance, be able to use knee without pain  [x] Progressing: [] Met: [] Not Met: [] Adjusted    Therapist goals for Patient:   Short Term Goals: To be achieved in: 2 weeks  1. Independent in HEP and progression per patient tolerance, in order to prevent re-injury.   [] Progressing: [x] Met: [] Not Met: [] Adjusted  2. Patient will have a decrease in pain to <1/10 to facilitate improvement in movement, function, and ADLs as indicated by Functional Deficits.  [] Progressing: [] Met: [x] Not Met: [] Adjusted    Long Term Goals: To be achieved in: 4 weeks  1.  Score of 60% or more confidence for the ABC Scale to assist with reaching prior level of function with activities such as transferring from his chair at home without difficulty.  [] Progressing: [x] Met: [] Not Met: [] Adjusted  2. Patient will demonstrate decreased speed needed to complete TUG to enable patient to walk short distances around the house without moments of imbalance.   [x] Progressing: [] Met: [] Not Met: [] Adjusted  3. Patient will demonstrate increased Strength of ankle DF ad PF to at least 5/5 throughout without pain to allow for proper functional mobility to enable patient to return to walking longer distances.   [] Progressing: [] Met: [x] Not Met: [] Adjusted  4. Patient will return to getting up from youngblood at home without increased symptoms, imbalance, or restriction.   [x] Progressing: [] Met: [] Not Met: [] Adjusted  5. Pt will be able to walk longer distances at home without experiencing any fatigue or episodes of imbalance.    [x] Progressing: [] Met: [] Not Met: [] Adjusted     Overall Progression Towards Functional goals/ Treatment Progress Update:  [] Patient is progressing as expected towards functional goals listed.    [x] Progression is slowed due to

## 2024-10-19 LAB
EST. AVERAGE GLUCOSE BLD GHB EST-MCNC: 165.7 MG/DL
HBA1C MFR BLD: 7.4 %

## 2024-10-21 ENCOUNTER — HOSPITAL ENCOUNTER (OUTPATIENT)
Dept: PHYSICAL THERAPY | Age: 84
Setting detail: THERAPIES SERIES
Discharge: HOME | End: 2024-10-21
Attending: FAMILY MEDICINE
Payer: MEDICARE

## 2024-10-21 PROCEDURE — 97530 THERAPEUTIC ACTIVITIES: CPT

## 2024-10-21 PROCEDURE — 97112 NEUROMUSCULAR REEDUCATION: CPT

## 2024-10-21 PROCEDURE — 97110 THERAPEUTIC EXERCISES: CPT

## 2024-10-21 NOTE — FLOWSHEET NOTE
HEP. Continues to display deficits in stiffness, weakness, and decreased NM control which required ongoing skilled physical therapy and decision making.      Medical Necessity Documentation:  I certify that this patient meets the below criteria necessary for medical necessity for care and/or justification of therapy services:  The patient has functional impairments and/or activity limitations and would benefit from continued outpatient therapy services to address the deficits outlined in the patients goals    Return to Play: NA    Prognosis for POC: [x] Good [] Fair  [] Poor    Patient requires continued skilled intervention: [x] Yes  [] No      CHARGE CAPTURE     PT CHARGE GRID   CPT Code (TIMED) minutes # CPT Code (UNTIMED) #     Therex (36626)  10 1  EVAL:MODERATE (44647 - Typically 30 minutes face-to-face)     Neuromusc. Re-ed (17957) 20 1  Re-Eval (99840)     Manual (48004)    Estim Unattended (03143)     Ther. Act (23444) 10 1  [facility]. Traction (28398)     Gait (23177)    Dry Needle 1-2 muscle (36907)     Aquatic Therex (19548)    Dry Needle 3+ muscle (20561)     Iontophoresis (63644)    VASO (76116)     Ultrasound (80508)    Group Therapy (01837)     Estim Attended (04582)    Canalith Repositioning (28217)     Other:    Other:    Total Timed Code Tx Minutes 40 3       Total Treatment Minutes 40        Charge Justification:  (09966) THERAPEUTIC EXERCISE - Provided verbal/tactile cueing for activities related to strengthening, flexibility, endurance, ROM performed to prevent loss of range of motion, maintain or improve muscular strength or increase flexibility, following either an injury or surgery.   (31175) HOME EXERCISE PROGRAM - Reviewed/Progressed HEP activities related to strengthening, flexibility, endurance, ROM performed to prevent loss of range of motion, maintain or improve muscular strength or increase flexibility, following either an injury or surgery.  (58026) NEUROMUSCULAR RE-EDUCATION -

## 2024-10-23 ENCOUNTER — HOSPITAL ENCOUNTER (OUTPATIENT)
Dept: PHYSICAL THERAPY | Age: 84
Setting detail: THERAPIES SERIES
Discharge: HOME | End: 2024-10-23
Attending: FAMILY MEDICINE
Payer: MEDICARE

## 2024-10-23 PROCEDURE — 97530 THERAPEUTIC ACTIVITIES: CPT

## 2024-10-23 PROCEDURE — 97110 THERAPEUTIC EXERCISES: CPT

## 2024-10-23 NOTE — FLOWSHEET NOTE
[facility] - Outpatient Rehabilitation and Therapy [address], [place] [address] office: [phone] fax: [phone]           Physical Therapy: TREATMENT/PROGRESS NOTE   Patient: Alan Schwartz (84 y.o. male)   Examination Date: 10/23/2024   :  1940 MRN: [id number]   Visit #:   Insurance Allowable Auth Needed   Medical necessity []Yes    [x]No    Insurance: Payor: MEDICARE / Plan: MEDICARE PART A AND B / Product Type: *No Product type* /   Insurance ID: [id number] - (Medicare)  Secondary Insurance (if applicable): HUMANA   Treatment Diagnosis:     ICD-10-CM    1. Decreased functional activity tolerance  R68.89       2. Decreased functional mobility and endurance  Z74.09       3. Imbalance  R26.89       4. Parkinson's disease, unspecified whether dyskinesia present, unspecified whether manifestations fluctuate (HCC)  G20.A1       5. Acute pain of left knee  M25.562          Medical Diagnosis:  Parkinson's disease, unspecified whether dyskinesia present, unspecified whether manifestations fluctuate (HCC) [G20.A1]  Weakness of both lower extremities [R29.898]  Falling [R29.6]   Referring Physician: Madison Tapia MD  PCP: Madison Tapia MD     Plan of care signed (Y/N): Y    Date of Patient follow up with Physician:      Plan of Care Report: NO  POC update due: (10 visits /OR AUTH LIMITS, whichever is less)  10/31/2024                                             Medical History:  Comorbidities:  Diabetes (Type I or II)  Hypertension  Parkinsons Disease  COVID-19  Relevant Medical History:                                          Precautions/ Contra-indications:           Latex allergy:  NO  Pacemaker:    NO  Contraindications for Manipulation: NA  Date of Surgery:   Other:    Red Flags:  None    Suicide Screening:   The patient did not verbalize a primary behavioral concern, suicidal ideation, suicidal intent, or demonstrate suicidal behaviors.    Preferred Language

## 2024-10-23 NOTE — PROGRESS NOTES
MEDICARE ANNUAL WELLNESS VISIT    Patient is here for their Medicare Annual Wellness Visit.  No concerns    Last eye exam: around 2 yrs ago, has upcoming appointment in [place]  Last dental exam: around 2 yrs ago  Exercise:   twice a week , physical therapy  Do you eat balanced/healthy meals regularly? No     How would you rate your overall health? : Good            10/25/2024     8:01 AM 7/19/2024     8:58 AM 6/1/2023    11:05 AM 3/28/2022     7:44 AM 3/1/2021    10:30 AM 8/7/2019    12:35 PM 6/19/2018     9:06 AM   Fall Risk   Do you feel unsteady or are you worried about falling?  no yes yes no      2 or more falls in past year? yes no no no no no no   Fall with injury in past year? no no no no no no no           10/25/2024     8:01 AM 2/16/2024    10:52 AM 6/1/2023    11:05 AM 9/29/2022     7:27 AM 3/28/2022     7:44 AM 3/1/2021    10:30 AM 2/6/2019    12:32 PM   PHQ Scores   PHQ2 Score 0 0 1 0 0 0 0   PHQ9 Score 0 0 1 0 0 0 0         Do you always wear a seat belt in the car?: Yes      Have you noted any problems with hearing?: No  Have you noted any vision problems?: No  Do you have concerns about your sexual health?: no  In the past month how much has pain been an issue for you?:  A little bit  In the past month have you had issues with anxiety, loneliness, irritability or fatigue:  Not at all    Do you take opioid medications even sometimes? No     Living Will and/or Healthcare POA: Yes,   Copy requested      Healthcare Decision Maker:    Primary Decision Maker: Savannah Hernandez - Child - [phone]           Who lives at home with you: no one  Do you have any pets? dog  Do you have any services coming to your home (meals on wheels, home health, etc) ?: yes - cleaning every other week, daughters help out, going to PT at [facility] currently      Do you need help with:  Using the phone:  No  Bathing: No  Dressing:  No  Toileting: No  Transportation:  Still drives some  Shopping: Daughter does the shopping  Preparing

## 2024-10-25 ENCOUNTER — OFFICE VISIT (OUTPATIENT)
Dept: FAMILY MEDICINE CLINIC | Age: 84
End: 2024-10-25

## 2024-10-25 VITALS
HEART RATE: 62 BPM | OXYGEN SATURATION: 96 % | HEIGHT: 69 IN | WEIGHT: 163 LBS | SYSTOLIC BLOOD PRESSURE: 110 MMHG | TEMPERATURE: 97.3 F | DIASTOLIC BLOOD PRESSURE: 50 MMHG | BODY MASS INDEX: 24.14 KG/M2

## 2024-10-25 DIAGNOSIS — Z79.4 TYPE 2 DIABETES MELLITUS WITH HYPERGLYCEMIA, WITH LONG-TERM CURRENT USE OF INSULIN (HCC): ICD-10-CM

## 2024-10-25 DIAGNOSIS — Z23 NEED FOR VACCINATION: ICD-10-CM

## 2024-10-25 DIAGNOSIS — N18.32 TYPE 2 DIABETES MELLITUS WITH STAGE 3B CHRONIC KIDNEY DISEASE, WITH LONG-TERM CURRENT USE OF INSULIN (HCC): ICD-10-CM

## 2024-10-25 DIAGNOSIS — E11.65 TYPE 2 DIABETES MELLITUS WITH HYPERGLYCEMIA, WITH LONG-TERM CURRENT USE OF INSULIN (HCC): ICD-10-CM

## 2024-10-25 DIAGNOSIS — Z79.4 TYPE 2 DIABETES MELLITUS WITH STAGE 3B CHRONIC KIDNEY DISEASE, WITH LONG-TERM CURRENT USE OF INSULIN (HCC): ICD-10-CM

## 2024-10-25 DIAGNOSIS — E11.22 TYPE 2 DIABETES MELLITUS WITH STAGE 3B CHRONIC KIDNEY DISEASE, WITH LONG-TERM CURRENT USE OF INSULIN (HCC): ICD-10-CM

## 2024-10-25 DIAGNOSIS — Z00.00 WELL ADULT EXAM: Primary | ICD-10-CM

## 2024-10-25 RX ORDER — METOPROLOL SUCCINATE 25 MG/1
25 TABLET, EXTENDED RELEASE ORAL DAILY
Qty: 90 TABLET | Refills: 3 | Status: SHIPPED | OUTPATIENT
Start: 2024-10-25

## 2024-10-25 ASSESSMENT — PATIENT HEALTH QUESTIONNAIRE - PHQ9
2. FEELING DOWN, DEPRESSED OR HOPELESS: NOT AT ALL
SUM OF ALL RESPONSES TO PHQ9 QUESTIONS 1 & 2: 0
1. LITTLE INTEREST OR PLEASURE IN DOING THINGS: NOT AT ALL
SUM OF ALL RESPONSES TO PHQ QUESTIONS 1-9: 0

## 2024-10-25 NOTE — PATIENT INSTRUCTIONS
--Make appointment to see the dentist     --Bring in copy of living will and healthcare power of  for your chart.

## 2024-10-25 NOTE — PROGRESS NOTES
Immunization(s) given during visit:     Immunizations Administered       Name Date Dose Route    Influenza, FLUAD, (age 65 y+), IM, Trivalent PF, 0.5mL 10/25/2024 0.5 mL Intramuscular    Site: Deltoid- Left    Lot: [id number]    NDC: 09465-901-09             Patient instructed to remain in clinic for 20 minutes after injection and was advised to report any adverse reaction to me immediately.

## 2024-10-28 ENCOUNTER — HOSPITAL ENCOUNTER (OUTPATIENT)
Dept: PHYSICAL THERAPY | Age: 84
Setting detail: THERAPIES SERIES
Discharge: HOME | End: 2024-10-28
Attending: FAMILY MEDICINE
Payer: MEDICARE

## 2024-10-28 PROCEDURE — 97110 THERAPEUTIC EXERCISES: CPT

## 2024-10-28 NOTE — FLOWSHEET NOTE
[facility] - Outpatient Rehabilitation and Therapy [address], [place] [address] office: [phone] fax: [phone]           Physical Therapy: TREATMENT/PROGRESS NOTE   Patient: Alan Schwartz (84 y.o. male)   Examination Date: 10/28/2024   :  1940 MRN: [id number]   Visit #:   Insurance Allowable Auth Needed   Medical necessity []Yes    [x]No    Insurance: Payor: MEDICARE / Plan: MEDICARE PART A AND B / Product Type: *No Product type* /   Insurance ID: [id number] - (Medicare)  Secondary Insurance (if applicable): HUMANA   Treatment Diagnosis:     ICD-10-CM    1. Decreased functional activity tolerance  R68.89       2. Decreased functional mobility and endurance  Z74.09       3. Imbalance  R26.89       4. Parkinson's disease, unspecified whether dyskinesia present, unspecified whether manifestations fluctuate (HCC)  G20.A1       5. Acute pain of left knee  M25.562          Medical Diagnosis:  Parkinson's disease, unspecified whether dyskinesia present, unspecified whether manifestations fluctuate (HCC) [G20.A1]  Weakness of both lower extremities [R29.898]  Falling [R29.6]   Referring Physician: Madison Tapia MD  PCP: Madison Tapia MD     Plan of care signed (Y/N): Y    Date of Patient follow up with Physician:      Plan of Care Report: NO  POC update due: (10 visits /OR AUTH LIMITS, whichever is less)  10/31/2024                                             Medical History:  Comorbidities:  Diabetes (Type I or II)  Hypertension  Parkinsons Disease  COVID-19  Relevant Medical History:                                          Precautions/ Contra-indications:           Latex allergy:  NO  Pacemaker:    NO  Contraindications for Manipulation: NA  Date of Surgery:   Other:    Red Flags:  None    Suicide Screening:   The patient did not verbalize a primary behavioral concern, suicidal ideation, suicidal intent, or demonstrate suicidal behaviors.    Preferred Language

## 2024-10-30 ENCOUNTER — HOSPITAL ENCOUNTER (OUTPATIENT)
Dept: PHYSICAL THERAPY | Age: 84
Setting detail: THERAPIES SERIES
Discharge: HOME | End: 2024-10-30
Attending: FAMILY MEDICINE
Payer: MEDICARE

## 2024-10-30 PROCEDURE — 97530 THERAPEUTIC ACTIVITIES: CPT

## 2024-10-30 PROCEDURE — 97110 THERAPEUTIC EXERCISES: CPT

## 2024-10-30 NOTE — PLAN OF CARE
chair when getting in position for stand to sit.  Frequent cues and reps improved form.  Pt reported having lift chair at home that he typically uses.     Access Code: [email or web address]  URL: [email or web address]  Date: 08/22/2024  Prepared by: Bere Nicolas    Exercises  - feet together balance eyes open  - 1-2 x daily - 5 x weekly - 3 sets - 30 second hold  - Standing March with Counter Support  - 1 x daily - 7 x weekly - 3 sets - 10 reps  - Sit to Stand with Armchair  - 1 x daily - 7 x weekly - 3 sets - 10 reps  - Side Stepping with Counter Support  - 1 x daily - 7 x weekly - 3 sets - 10 reps    ASSESSMENT     Today's Assessment: TRANSITION TO HEP/DC FORMAL PT: Patient is currently independent with symptom management and home exercise program. Patient reports understanding of the importance of continued compliance with home exercise program after discharge.  Patient has reached 4/5 long term goals and should reach all additional goals with compliance to home exercise program upon discharge.       Medical Necessity Documentation:  I certify that this patient meets the below criteria necessary for medical necessity for care and/or justification of therapy services:  The patient has functional impairments and/or activity limitations and would benefit from continued outpatient therapy services to address the deficits outlined in the patients goals    Return to Play: NA    Prognosis for POC: [x] Good [] Fair  [] Poor    Patient requires continued skilled intervention: [] Yes  [x] No      CHARGE CAPTURE     PT CHARGE GRID   CPT Code (TIMED) minutes # CPT Code (UNTIMED) #     Therex (74144)  16 1  EVAL:MODERATE (66614 - Typically 30 minutes face-to-face)     Neuromusc. Re-ed (99687)    Re-Eval (89690)     Manual (88572)    Estim Unattended (12094)     Ther. Act (30019) 24 2  [facility] (04473)     Gait (39977)    Dry Needle 1-2 muscle (20560)     Aquatic Therex (54219)    Dry Needle 3+ muscle (20561)     Iontophoresis

## 2024-11-12 ENCOUNTER — HOSPITAL ENCOUNTER (OUTPATIENT)
Age: 84
Discharge: HOME | End: 2024-11-12
Payer: MEDICARE

## 2024-11-12 DIAGNOSIS — I12.9 TYPE 2 DM WITH CKD STAGE 2 AND HYPERTENSION (HCC): ICD-10-CM

## 2024-11-12 DIAGNOSIS — E87.5 HYPERKALEMIA: ICD-10-CM

## 2024-11-12 DIAGNOSIS — R60.0 EDEMA LEG: ICD-10-CM

## 2024-11-12 DIAGNOSIS — I10 ESSENTIAL HYPERTENSION: ICD-10-CM

## 2024-11-12 DIAGNOSIS — E11.22 TYPE 2 DM WITH CKD STAGE 2 AND HYPERTENSION (HCC): ICD-10-CM

## 2024-11-12 DIAGNOSIS — N18.2 TYPE 2 DM WITH CKD STAGE 2 AND HYPERTENSION (HCC): ICD-10-CM

## 2024-11-12 DIAGNOSIS — E55.9 VITAMIN D DEFICIENCY: ICD-10-CM

## 2024-11-12 DIAGNOSIS — N18.31 CHRONIC KIDNEY DISEASE, STAGE 3A (HCC): ICD-10-CM

## 2024-11-12 LAB
ALBUMIN SERPL-MCNC: 3.9 G/DL (ref 3.4–5)
ANION GAP SERPL CALCULATED.3IONS-SCNC: 11 MMOL/L (ref 3–16)
BACTERIA URNS QL MICRO: ABNORMAL /HPF
BILIRUB UR QL STRIP.AUTO: NEGATIVE
BUN SERPL-MCNC: 47 MG/DL (ref 7–20)
CALCIUM SERPL-MCNC: 9.5 MG/DL (ref 8.3–10.6)
CHLORIDE SERPL-SCNC: 101 MMOL/L (ref 99–110)
CLARITY UR: CLEAR
CO2 SERPL-SCNC: 25 MMOL/L (ref 21–32)
COLOR UR: YELLOW
CREAT SERPL-MCNC: 1.5 MG/DL (ref 0.8–1.3)
CREAT UR-MCNC: 103 MG/DL (ref 39–259)
DEPRECATED RDW RBC AUTO: 16 % (ref 12.4–15.4)
EPI CELLS #/AREA URNS AUTO: 1 /HPF (ref 0–5)
GFR SERPLBLD CREATININE-BSD FMLA CKD-EPI: 45 ML/MIN/{1.73_M2}
GLUCOSE SERPL-MCNC: 117 MG/DL (ref 70–99)
GLUCOSE UR STRIP.AUTO-MCNC: >=1000 MG/DL
HCT VFR BLD AUTO: 48.2 % (ref 40.5–52.5)
HGB BLD-MCNC: 15.9 G/DL (ref 13.5–17.5)
HGB UR QL STRIP.AUTO: NEGATIVE
HYALINE CASTS #/AREA URNS AUTO: 1 /LPF (ref 0–8)
KETONES UR STRIP.AUTO-MCNC: NEGATIVE MG/DL
LEUKOCYTE ESTERASE UR QL STRIP.AUTO: NEGATIVE
MAGNESIUM SERPL-MCNC: 2.42 MG/DL (ref 1.8–2.4)
MCH RBC QN AUTO: 29.7 PG (ref 26–34)
MCHC RBC AUTO-ENTMCNC: 32.9 G/DL (ref 31–36)
MCV RBC AUTO: 90.2 FL (ref 80–100)
NITRITE UR QL STRIP.AUTO: NEGATIVE
PH UR STRIP.AUTO: 5 [PH] (ref 5–8)
PHOSPHATE SERPL-MCNC: 4.5 MG/DL (ref 2.5–4.9)
PLATELET # BLD AUTO: 286 K/UL (ref 135–450)
PMV BLD AUTO: 8.5 FL (ref 5–10.5)
POTASSIUM SERPL-SCNC: 5.1 MMOL/L (ref 3.5–5.1)
PROT UR STRIP.AUTO-MCNC: NEGATIVE MG/DL
PROT UR-MCNC: 19 MG/DL
PROT/CREAT UR-RTO: 0.2 MG/DL
RBC # BLD AUTO: 5.35 M/UL (ref 4.2–5.9)
RBC CLUMPS #/AREA URNS AUTO: 0 /HPF (ref 0–4)
SODIUM SERPL-SCNC: 137 MMOL/L (ref 136–145)
SP GR UR STRIP.AUTO: >=1.03 (ref 1–1.03)
UA DIPSTICK W REFLEX MICRO PNL UR: ABNORMAL
URN SPEC COLLECT METH UR: ABNORMAL
UROBILINOGEN UR STRIP-ACNC: 0.2 E.U./DL
WBC # BLD AUTO: 10.4 K/UL (ref 4–11)
WBC #/AREA URNS AUTO: 1 /HPF (ref 0–5)

## 2024-11-12 PROCEDURE — 83735 ASSAY OF MAGNESIUM: CPT

## 2024-11-12 PROCEDURE — 85027 COMPLETE CBC AUTOMATED: CPT

## 2024-11-12 PROCEDURE — 36415 COLL VENOUS BLD VENIPUNCTURE: CPT

## 2024-11-12 PROCEDURE — 84156 ASSAY OF PROTEIN URINE: CPT

## 2024-11-12 PROCEDURE — 80069 RENAL FUNCTION PANEL: CPT

## 2024-11-12 PROCEDURE — 82570 ASSAY OF URINE CREATININE: CPT

## 2024-11-12 PROCEDURE — 81001 URINALYSIS AUTO W/SCOPE: CPT

## 2024-12-06 ENCOUNTER — HOSPITAL ENCOUNTER (OUTPATIENT)
Dept: PULMONOLOGY | Age: 84
Discharge: HOME | End: 2024-12-06
Attending: INTERNAL MEDICINE
Payer: MEDICARE

## 2024-12-06 VITALS — OXYGEN SATURATION: 96 % | HEART RATE: 66 BPM | RESPIRATION RATE: 16 BRPM

## 2024-12-06 DIAGNOSIS — J84.9 INTERSTITIAL LUNG DISEASE (HCC): ICD-10-CM

## 2024-12-06 LAB
DLCO %PRED: 52 %
DLCO PRED: NORMAL
DLCO/VA %PRED: NORMAL
DLCO/VA PRED: NORMAL
DLCO/VA: NORMAL
DLCO: NORMAL
EXPIRATORY TIME-POST: NORMAL
EXPIRATORY TIME: NORMAL
FEF 25-75 %CHNG: NORMAL
FEF 25-75 POST %PRED: NORMAL
FEF 25-75% %PRED-PRE: NORMAL
FEF 25-75% PRED: NORMAL
FEF 25-75-POST: NORMAL
FEF 25-75-PRE: NORMAL
FEV1 %PRED-POST: 81 %
FEV1 %PRED-PRE: 77 %
FEV1 PRED: NORMAL
FEV1-POST: NORMAL
FEV1-PRE: NORMAL
FEV1/FVC %PRED-POST: NORMAL
FEV1/FVC %PRED-PRE: NORMAL
FEV1/FVC PRED: NORMAL
FEV1/FVC-POST: 80 %
FEV1/FVC-PRE: 77 %
FVC %PRED-POST: NORMAL
FVC %PRED-PRE: NORMAL
FVC PRED: NORMAL
FVC-POST: NORMAL
FVC-PRE: NORMAL
GAW %PRED: NORMAL
GAW PRED: NORMAL
GAW: NORMAL
IC PRE %PRED: NORMAL
IC PRED: NORMAL
IC: NORMAL
MEP: NORMAL
MIP: NORMAL
MVV %PRED-PRE: NORMAL
MVV PRED: NORMAL
MVV-PRE: NORMAL
PEF %PRED-POST: NORMAL
PEF %PRED-PRE: NORMAL
PEF PRED: NORMAL
PEF%CHNG: NORMAL
PEF-POST: NORMAL
PEF-PRE: NORMAL
RAW %PRED: NORMAL
RAW PRED: NORMAL
RAW: NORMAL
RV PRE %PRED: NORMAL
RV PRED: NORMAL
RV: NORMAL
SVC %PRED: NORMAL
SVC PRED: NORMAL
SVC: NORMAL
TLC PRE %PRED: 68 %
TLC PRED: NORMAL
TLC: NORMAL
VA %PRED: NORMAL
VA PRED: NORMAL
VA: NORMAL
VTG %PRED: NORMAL
VTG PRED: NORMAL
VTG: NORMAL

## 2024-12-06 PROCEDURE — 94729 DIFFUSING CAPACITY: CPT

## 2024-12-06 PROCEDURE — 94760 N-INVAS EAR/PLS OXIMETRY 1: CPT

## 2024-12-06 PROCEDURE — 94726 PLETHYSMOGRAPHY LUNG VOLUMES: CPT

## 2024-12-06 PROCEDURE — 94060 EVALUATION OF WHEEZING: CPT

## 2024-12-06 PROCEDURE — 6370000000 HC RX 637 (ALT 250 FOR IP): Performed by: INTERNAL MEDICINE

## 2024-12-06 RX ORDER — ALBUTEROL SULFATE 90 UG/1
4 INHALANT RESPIRATORY (INHALATION) ONCE
Status: COMPLETED | OUTPATIENT
Start: 2024-12-06 | End: 2024-12-06

## 2024-12-06 RX ADMIN — Medication 4 PUFF: at 08:00

## 2024-12-06 ASSESSMENT — PULMONARY FUNCTION TESTS
FEV1_PERCENT_PREDICTED_POST: 81
FEV1/FVC_PRE: 77
FEV1/FVC_POST: 80
FEV1_PERCENT_PREDICTED_PRE: 77

## 2024-12-11 ENCOUNTER — OFFICE VISIT (OUTPATIENT)
Dept: PULMONOLOGY | Age: 84
End: 2024-12-11

## 2024-12-11 VITALS
HEART RATE: 68 BPM | BODY MASS INDEX: 24.35 KG/M2 | DIASTOLIC BLOOD PRESSURE: 58 MMHG | OXYGEN SATURATION: 94 % | WEIGHT: 164.4 LBS | HEIGHT: 69 IN | SYSTOLIC BLOOD PRESSURE: 110 MMHG

## 2024-12-11 DIAGNOSIS — J84.9 INTERSTITIAL LUNG DISEASE (HCC): Primary | ICD-10-CM

## 2024-12-11 DIAGNOSIS — J30.1 NON-SEASONAL ALLERGIC RHINITIS DUE TO POLLEN: ICD-10-CM

## 2024-12-11 DIAGNOSIS — R05.3 CHRONIC COUGH: ICD-10-CM

## 2024-12-11 RX ORDER — IBUPROFEN 200 MG
200 TABLET ORAL EVERY 6 HOURS PRN
COMMUNITY

## 2024-12-11 NOTE — PROGRESS NOTES
PULMONARY OFFICE FOLLOW-UP VISIT    CONSULTING PHYSICIAN:  Madison Tapia MD     REASON FOR VISIT:   Chief Complaint   Patient presents with    ILD       DATE OF VISIT: 12/11/2024    HISTORY OF PRESENT ILLNESS: 84 y.o. year old male is into the pulmonary office for a follow-up.  Continues to report stable breathing.  Has been walking around with the help of a walker.  His Parkinson's is stable.  He has not had any recent falls.  Denies any cough, discolored expectoration, chest pain or chest tightness.  Has been checking his oxygen levels at home and they have never reduced below 90%.  Did injure his left knee and currently using a brace to stabilize it.    Previously: Reports stable breathing.  Denies any new symptoms.  No persistent cough.  Has been trying to stay active.  Does walk slowly due to his Parkinson disease.  Has not used oxygen while ambulating.  Reports that whenever he is checking his oxygen level at home it is generally 90% and above.  Recently had a lot of leg wounds and has been going to wound center for management.  Does have diabetes. Patient reports that his breathing has been stable.  He has been walking slowly mostly because of his Parkinson disease.  Denies any wheezing, cough, discolored expectoration.  Denies any chest pain or chest tightness.  Frequently checks his oxygen level at home.  Reports that it mostly stays in the 90s. Patient suffers with Parkinson disease for the last 4 to 5 years.  He has been on Sinemet therapy.  While his tremors have been under control, his balance has been off.  In June of this year he had a fall at home and was admitted to [facility].  He had an extensive diagnostic work-up done including a CT chest.  CT chest showed abnormal findings.  At that time patient was having some dry cough which she felt was due to postnasal drip.  Currently denies any respiratory symptoms.  No shortness of breath, no cough, no expectoration, no

## 2025-01-23 ENCOUNTER — HOSPITAL ENCOUNTER (OUTPATIENT)
Age: 85
Discharge: HOME | End: 2025-01-23
Attending: INTERNAL MEDICINE
Payer: MEDICARE

## 2025-01-23 ENCOUNTER — HOSPITAL ENCOUNTER (OUTPATIENT)
Dept: CT IMAGING | Age: 85
Discharge: HOME | End: 2025-01-23
Attending: INTERNAL MEDICINE
Payer: MEDICARE

## 2025-01-23 DIAGNOSIS — J84.9 INTERSTITIAL LUNG DISEASE (HCC): ICD-10-CM

## 2025-01-23 DIAGNOSIS — Z79.4 TYPE 2 DIABETES MELLITUS WITH HYPERGLYCEMIA, WITH LONG-TERM CURRENT USE OF INSULIN (HCC): ICD-10-CM

## 2025-01-23 DIAGNOSIS — E11.65 TYPE 2 DIABETES MELLITUS WITH HYPERGLYCEMIA, WITH LONG-TERM CURRENT USE OF INSULIN (HCC): ICD-10-CM

## 2025-01-23 LAB
ANION GAP SERPL CALCULATED.3IONS-SCNC: 10 MMOL/L (ref 3–16)
BUN SERPL-MCNC: 46 MG/DL (ref 7–20)
CALCIUM SERPL-MCNC: 9.2 MG/DL (ref 8.3–10.6)
CHLORIDE SERPL-SCNC: 104 MMOL/L (ref 99–110)
CO2 SERPL-SCNC: 23 MMOL/L (ref 21–32)
CREAT SERPL-MCNC: 1.5 MG/DL (ref 0.8–1.3)
EST. AVERAGE GLUCOSE BLD GHB EST-MCNC: 194.4 MG/DL
GFR SERPLBLD CREATININE-BSD FMLA CKD-EPI: 45 ML/MIN/{1.73_M2}
GLUCOSE SERPL-MCNC: 172 MG/DL (ref 70–99)
HBA1C MFR BLD: 8.4 %
POTASSIUM SERPL-SCNC: 4.9 MMOL/L (ref 3.5–5.1)
SODIUM SERPL-SCNC: 137 MMOL/L (ref 136–145)

## 2025-01-23 PROCEDURE — 83036 HEMOGLOBIN GLYCOSYLATED A1C: CPT

## 2025-01-23 PROCEDURE — 71250 CT THORAX DX C-: CPT

## 2025-01-23 PROCEDURE — 36415 COLL VENOUS BLD VENIPUNCTURE: CPT

## 2025-01-23 PROCEDURE — 80048 BASIC METABOLIC PNL TOTAL CA: CPT

## 2025-01-24 NOTE — PROGRESS NOTES
Alan Schwartz is a 84 y.o. male who presents for follow-up of Type 2 diabetes mellitus.       Current medication use: taking as prescribed (basal insulin, mealtime insulin, and Jardiance)  Eye exam current (within one year): no, but has appointment scheduled   Exercise:  daily, activities of daily living only and some walking with dog  Do you eat balanced/healthy meals regularly?  Off and on  Do you limit your carbs (pasta/rice/bread/potatoes/sugar) No   Sees podiatrist: yes  Checks sugars at home:  3x/day  Hypoglycemia symptoms: No  AM Fastin-180's, states mostly 120's  Lunch: 100  Dinner: 120's      Checks BP at home: No  BP numbers: N/A  Taking medication daily: Yes (metoprolol)  Side effects of medication: no      Parkinson's - Dr. Donnelly, feels like meds working well.     Body mass index is 24.45 kg/m².  Vitals:    25 0858   BP: (!) 120/50   Site: Left Upper Arm   Position: Sitting   Cuff Size: Medium Adult   Pulse: 64   Temp: 98.1 °F (36.7 °C)   TempSrc: Infrared   Weight: 75.1 kg (165 lb 9.6 oz)     Wt Readings from Last 3 Encounters:   25 75.1 kg (165 lb 9.6 oz)   24 74.6 kg (164 lb 6.4 oz)   24 73.5 kg (162 lb)       GENERAL:Alert and oriented x 4 NAD, affect appropriate and normal appearing weight, well hydrated, well developed.  LUNG:clear to auscultation bilaterally with normal respiratory effort and good air movement  CV: Normal heart sounds, regular rate and rhythm without murmurs    LE Edema: abnormal - 2+ bilaterally,  LE Pulses:  normal,       Monofilament Sensation:  abnormal - defer, known neuropathy,   LE Skin lesions: normal   Bunions: yes -   Hammertoes: yes -   Callus: yes -   Nail Deformity: yes -   Signs of Fungus: no          2025     8:55 AM 10/25/2024     8:01 AM 2024    10:52 AM 2023    11:05 AM 2022     7:27 AM 3/28/2022     7:44 AM 3/1/2021    10:30 AM   PHQ Scores   PHQ2 Score 0 0 0 1 0 0 0   PHQ9 Score 0 0 0 1 0 0 0

## 2025-01-27 ENCOUNTER — OFFICE VISIT (OUTPATIENT)
Dept: FAMILY MEDICINE CLINIC | Age: 85
End: 2025-01-27

## 2025-01-27 VITALS
SYSTOLIC BLOOD PRESSURE: 120 MMHG | HEART RATE: 64 BPM | BODY MASS INDEX: 24.45 KG/M2 | WEIGHT: 165.6 LBS | DIASTOLIC BLOOD PRESSURE: 50 MMHG | TEMPERATURE: 98.1 F

## 2025-01-27 DIAGNOSIS — E11.22 TYPE 2 DIABETES MELLITUS WITH STAGE 3B CHRONIC KIDNEY DISEASE, WITH LONG-TERM CURRENT USE OF INSULIN (HCC): Primary | ICD-10-CM

## 2025-01-27 DIAGNOSIS — G20.B1 PARKINSON'S DISEASE WITH DYSKINESIA WITHOUT FLUCTUATING MANIFESTATIONS (HCC): ICD-10-CM

## 2025-01-27 DIAGNOSIS — E11.42 DIABETIC PERIPHERAL NEUROPATHY (HCC): ICD-10-CM

## 2025-01-27 DIAGNOSIS — Z79.4 TYPE 2 DIABETES MELLITUS WITH STAGE 3B CHRONIC KIDNEY DISEASE, WITH LONG-TERM CURRENT USE OF INSULIN (HCC): Primary | ICD-10-CM

## 2025-01-27 DIAGNOSIS — N18.32 TYPE 2 DIABETES MELLITUS WITH STAGE 3B CHRONIC KIDNEY DISEASE, WITH LONG-TERM CURRENT USE OF INSULIN (HCC): Primary | ICD-10-CM

## 2025-01-27 PROBLEM — L97.211 NON-PRESSURE CHRONIC ULCER OF RIGHT CALF, LIMITED TO BREAKDOWN OF SKIN (HCC): Status: RESOLVED | Noted: 2024-02-02 | Resolved: 2025-01-27

## 2025-01-27 PROBLEM — L97.222 NON-PRESSURE CHRONIC ULCER OF LEFT CALF WITH FAT LAYER EXPOSED (HCC): Status: RESOLVED | Noted: 2024-02-02 | Resolved: 2025-01-27

## 2025-01-27 PROBLEM — L98.499 DIABETES MELLITUS WITH SKIN ULCER (HCC): Status: RESOLVED | Noted: 2024-02-02 | Resolved: 2025-01-27

## 2025-01-27 PROBLEM — E11.622 DIABETES MELLITUS WITH SKIN ULCER (HCC): Status: RESOLVED | Noted: 2024-02-02 | Resolved: 2025-01-27

## 2025-01-27 PROBLEM — L97.512 RIGHT FOOT ULCER, WITH FAT LAYER EXPOSED (HCC): Status: RESOLVED | Noted: 2024-02-02 | Resolved: 2025-01-27

## 2025-01-27 RX ORDER — ROSUVASTATIN CALCIUM 5 MG/1
5 TABLET, COATED ORAL DAILY
Qty: 90 TABLET | Refills: 3 | Status: SHIPPED | OUTPATIENT
Start: 2025-01-27

## 2025-01-27 RX ORDER — LEVOTHYROXINE SODIUM 50 UG/1
50 TABLET ORAL DAILY
Qty: 90 TABLET | Refills: 3 | OUTPATIENT
Start: 2025-01-27

## 2025-01-27 RX ORDER — LEVOTHYROXINE SODIUM 50 UG/1
50 TABLET ORAL DAILY
Qty: 90 TABLET | Refills: 3 | Status: SHIPPED | OUTPATIENT
Start: 2025-01-27

## 2025-01-27 RX ORDER — METOPROLOL SUCCINATE 25 MG/1
25 TABLET, EXTENDED RELEASE ORAL DAILY
Qty: 90 TABLET | Refills: 3 | Status: SHIPPED | OUTPATIENT
Start: 2025-01-27

## 2025-01-27 SDOH — ECONOMIC STABILITY: FOOD INSECURITY: WITHIN THE PAST 12 MONTHS, YOU WORRIED THAT YOUR FOOD WOULD RUN OUT BEFORE YOU GOT MONEY TO BUY MORE.: NEVER TRUE

## 2025-01-27 SDOH — ECONOMIC STABILITY: FOOD INSECURITY: WITHIN THE PAST 12 MONTHS, THE FOOD YOU BOUGHT JUST DIDN'T LAST AND YOU DIDN'T HAVE MONEY TO GET MORE.: NEVER TRUE

## 2025-01-27 ASSESSMENT — PATIENT HEALTH QUESTIONNAIRE - PHQ9
SUM OF ALL RESPONSES TO PHQ QUESTIONS 1-9: 0
SUM OF ALL RESPONSES TO PHQ9 QUESTIONS 1 & 2: 0
1. LITTLE INTEREST OR PLEASURE IN DOING THINGS: NOT AT ALL
SUM OF ALL RESPONSES TO PHQ QUESTIONS 1-9: 0
2. FEELING DOWN, DEPRESSED OR HOPELESS: NOT AT ALL

## 2025-02-10 RX ORDER — INSULIN GLARGINE 100 [IU]/ML
INJECTION, SOLUTION SUBCUTANEOUS
Qty: 15 ML | Refills: 3 | Status: SHIPPED | OUTPATIENT
Start: 2025-02-10

## 2025-02-10 NOTE — TELEPHONE ENCOUNTER
Medication:   Requested Prescriptions     Pending Prescriptions Disp Refills    LANTUS SOLOSTAR 100 UNIT/ML injection pen [Pharmacy Med Name: LANTUS SOLOSTAR 100 UNIT/ML] 15 mL      Sig: INJECT 26 UNITS INTO THE SKIN ONCE NIGHTLY          Patient Phone Number: [phone] (home) [phone] (work)    Last appt: 1/27/2025   Next appt: 4/28/2025    Last OARRS:        No data to display              PDMP Monitoring:    Last PDMP Guzman as Reviewed ([place]):  Review User Review Instant Review Result          Preferred Pharmacy:   [facility] [id number] - [place], [place] - [address] [phone] - F [phone]  [address] [place] [address]  Phone: [phone] Fax: [phone]

## 2025-02-11 NOTE — PROGRESS NOTES
[facility]   Cardiac Follow up    Referring Provider:  Madison Tapia MD     Chief Complaint   Patient presents with    1 Year Follow Up    Coronary Artery Disease    Hypertension     History of Present Illness:  Mr. Schwartz is a 84 y.o. male who has a history of CAD, hypertension, hyperlipidemia and shoulder surgery. Prior to stenting in December 2013, he was short of breath while snow blowing. Previously suffered from rhabdomyolysis on Lipitor (had taken for 15 years prior without problem). Last stress test was stable. He is a retired [employer] . He is a non smoker.  His dermatologist is Dr Valdez from [facility]. He has help from his 2 daughters who live close and he lives with his dog. His son is a gastroenterologist in [facility].    He presented to the emergency department with abnormal labs on 7/10/23.  The patient was sent in by primary care.  Reported that he had labs completed on 7/5/2023 which were abnormal and then repeated 7/9/23, again abnormal.  The patient reports that his kidney function and potassium were abnormal. Patient was hospitalized at [facility] with head injury 6/2023 and was started on Lasix for concern of congestive heart failure.  The patient has no history of congestive heart failure.  He was diuresed and discharged home.  He did continue to have bilateral leg swelling and some rhonchi noted on respiratory exam, the patient reported that he is not short of breath.  He had gained 2 pounds over the past 2 weeks. Nephrology was called and scheduled outpatient follow up.  Patient wanted to be discharged home. Admission offered for ECHO and cardiology referral to diagnose CHF if appropriate as the patient had not had an ECHO.  Patient wanted to be discharged home.  Labs were recollected in the emergency department 7/10/23 > CBC  unremarkable.  CMP showed a BUN of 64 with a creatinine of 1.8 and GFR of 37.  The patient's potassium was normal.  Patient

## 2025-02-26 ENCOUNTER — OFFICE VISIT (OUTPATIENT)
Dept: CARDIOLOGY CLINIC | Age: 85
End: 2025-02-26
Payer: MEDICARE

## 2025-02-26 VITALS
DIASTOLIC BLOOD PRESSURE: 64 MMHG | OXYGEN SATURATION: 97 % | HEIGHT: 69 IN | HEART RATE: 72 BPM | BODY MASS INDEX: 24.26 KG/M2 | WEIGHT: 163.8 LBS | SYSTOLIC BLOOD PRESSURE: 132 MMHG

## 2025-02-26 DIAGNOSIS — E78.49 OTHER HYPERLIPIDEMIA: ICD-10-CM

## 2025-02-26 DIAGNOSIS — Z98.61 S/P CORONARY ANGIOPLASTY: ICD-10-CM

## 2025-02-26 DIAGNOSIS — I10 PRIMARY HYPERTENSION: ICD-10-CM

## 2025-02-26 DIAGNOSIS — I25.10 CORONARY ARTERY DISEASE INVOLVING NATIVE CORONARY ARTERY OF NATIVE HEART WITHOUT ANGINA PECTORIS: Primary | ICD-10-CM

## 2025-02-26 DIAGNOSIS — E11.42 DIABETIC PERIPHERAL NEUROPATHY (HCC): ICD-10-CM

## 2025-02-26 DIAGNOSIS — E11.9 TYPE 2 DIABETES MELLITUS WITHOUT COMPLICATION, WITHOUT LONG-TERM CURRENT USE OF INSULIN (HCC): ICD-10-CM

## 2025-02-26 PROCEDURE — G8420 CALC BMI NORM PARAMETERS: HCPCS | Performed by: INTERNAL MEDICINE

## 2025-02-26 PROCEDURE — 1159F MED LIST DOCD IN RCRD: CPT | Performed by: INTERNAL MEDICINE

## 2025-02-26 PROCEDURE — 3052F HG A1C>EQUAL 8.0%<EQUAL 9.0%: CPT | Performed by: INTERNAL MEDICINE

## 2025-02-26 PROCEDURE — 1123F ACP DISCUSS/DSCN MKR DOCD: CPT | Performed by: INTERNAL MEDICINE

## 2025-02-26 PROCEDURE — 99214 OFFICE O/P EST MOD 30 MIN: CPT | Performed by: INTERNAL MEDICINE

## 2025-02-26 PROCEDURE — 3078F DIAST BP <80 MM HG: CPT | Performed by: INTERNAL MEDICINE

## 2025-02-26 PROCEDURE — G8427 DOCREV CUR MEDS BY ELIG CLIN: HCPCS | Performed by: INTERNAL MEDICINE

## 2025-02-26 PROCEDURE — 3075F SYST BP GE 130 - 139MM HG: CPT | Performed by: INTERNAL MEDICINE

## 2025-02-26 PROCEDURE — 1036F TOBACCO NON-USER: CPT | Performed by: INTERNAL MEDICINE

## 2025-02-27 ENCOUNTER — OFFICE VISIT (OUTPATIENT)
Dept: NEUROLOGY | Age: 85
End: 2025-02-27
Payer: MEDICARE

## 2025-02-27 VITALS
DIASTOLIC BLOOD PRESSURE: 57 MMHG | HEART RATE: 73 BPM | WEIGHT: 163 LBS | HEIGHT: 69 IN | SYSTOLIC BLOOD PRESSURE: 118 MMHG | BODY MASS INDEX: 24.14 KG/M2

## 2025-02-27 DIAGNOSIS — E11.42 DIABETIC PERIPHERAL NEUROPATHY (HCC): ICD-10-CM

## 2025-02-27 DIAGNOSIS — G20.B1 PARKINSON'S DISEASE WITH DYSKINESIA WITHOUT FLUCTUATING MANIFESTATIONS (HCC): Primary | ICD-10-CM

## 2025-02-27 DIAGNOSIS — I10 PRIMARY HYPERTENSION: ICD-10-CM

## 2025-02-27 PROCEDURE — 1160F RVW MEDS BY RX/DR IN RCRD: CPT | Performed by: PSYCHIATRY & NEUROLOGY

## 2025-02-27 PROCEDURE — 99214 OFFICE O/P EST MOD 30 MIN: CPT | Performed by: PSYCHIATRY & NEUROLOGY

## 2025-02-27 PROCEDURE — 1159F MED LIST DOCD IN RCRD: CPT | Performed by: PSYCHIATRY & NEUROLOGY

## 2025-02-27 PROCEDURE — 3052F HG A1C>EQUAL 8.0%<EQUAL 9.0%: CPT | Performed by: PSYCHIATRY & NEUROLOGY

## 2025-02-27 PROCEDURE — G8427 DOCREV CUR MEDS BY ELIG CLIN: HCPCS | Performed by: PSYCHIATRY & NEUROLOGY

## 2025-02-27 PROCEDURE — 1123F ACP DISCUSS/DSCN MKR DOCD: CPT | Performed by: PSYCHIATRY & NEUROLOGY

## 2025-02-27 PROCEDURE — G8420 CALC BMI NORM PARAMETERS: HCPCS | Performed by: PSYCHIATRY & NEUROLOGY

## 2025-02-27 PROCEDURE — 3074F SYST BP LT 130 MM HG: CPT | Performed by: PSYCHIATRY & NEUROLOGY

## 2025-02-27 PROCEDURE — 3078F DIAST BP <80 MM HG: CPT | Performed by: PSYCHIATRY & NEUROLOGY

## 2025-02-27 PROCEDURE — 1036F TOBACCO NON-USER: CPT | Performed by: PSYCHIATRY & NEUROLOGY

## 2025-02-27 RX ORDER — CARBIDOPA AND LEVODOPA 25; 100 MG/1; MG/1
TABLET ORAL
Qty: 420 TABLET | Refills: 1 | Status: SHIPPED | OUTPATIENT
Start: 2025-02-27

## 2025-02-27 NOTE — PROGRESS NOTES
The patient came today for follow up regarding: Parkinson's disease        Interval history:    The patient came today with his wife    Since her last visit, he continues take the same dose of Sinemet  mg tablets, 1 and half tablets, 3 times daily.  No side effect of such medication.  The same tremors bilaterally.  Walks with his walker.  No recent falling.  Tremors are waxing and waning.  Worse with activity and stress.  No recent rigidity worsening, hallucination or major insomnia.  He is diabetic.  Last A1c 8.4.  Still independent.  Lives with his wife.  No major memory issues.  No recent falling or worsening headaches or  depression.  He is on Plavix for stroke prevention.  History of diabetes on insulin sliding scale.  He is on Crestor at night and Lopressor for hypertension.  No other new symptoms today.  Other review of system was unremarkable.    Background history:    Patient complaints of tremors in both his arms.  It initially started about a year ago and seems to be slowly getting worse.  Onset was gradual.  Symptoms are persistent.  No clear aggravating or relieving factors to symptoms.  Patient denies any stiffness.  He denies any difficulty with his gait   Patient has known diabetes which is well controlled  Patient denies any difficulty getting in and out of a car or rolling over in bed.  Patient complains of some generalized weakness in his legs        Exam:   Constitutional:   Vitals:    02/27/25 1143 02/27/25 1154   BP: (!) 132/59 (!) 118/57   Pulse: 70 73   Weight: 73.9 kg (163 lb)    Height: 1.753 m (5' 9.02\")        General appearance:  Normal development and appear in no acute distress.   Mental Status:   Oriented to person, place, problem, and time.    Memory: Good immediate recall.  Intact remote memory  Normal attention span and concentration.  Language: intact naming, repeating and fluency   Good fund of Knowledge. Aware of current events and vocabulary   Cranial Nerves:   II:

## 2025-03-06 DIAGNOSIS — G20.B1 PARKINSON'S DISEASE WITH DYSKINESIA WITHOUT FLUCTUATING MANIFESTATIONS (HCC): ICD-10-CM

## 2025-03-06 RX ORDER — CARBIDOPA AND LEVODOPA 25; 100 MG/1; MG/1
TABLET ORAL
Qty: 409 TABLET | OUTPATIENT
Start: 2025-03-06

## 2025-04-17 ENCOUNTER — HOSPITAL ENCOUNTER (OUTPATIENT)
Age: 85
Discharge: HOME | End: 2025-04-17
Payer: MEDICARE

## 2025-04-17 DIAGNOSIS — Z79.4 TYPE 2 DIABETES MELLITUS WITH STAGE 3B CHRONIC KIDNEY DISEASE, WITH LONG-TERM CURRENT USE OF INSULIN (HCC): ICD-10-CM

## 2025-04-17 DIAGNOSIS — N18.32 TYPE 2 DIABETES MELLITUS WITH STAGE 3B CHRONIC KIDNEY DISEASE, WITH LONG-TERM CURRENT USE OF INSULIN (HCC): ICD-10-CM

## 2025-04-17 DIAGNOSIS — E11.22 TYPE 2 DIABETES MELLITUS WITH STAGE 3B CHRONIC KIDNEY DISEASE, WITH LONG-TERM CURRENT USE OF INSULIN (HCC): ICD-10-CM

## 2025-04-17 LAB
ANION GAP SERPL CALCULATED.3IONS-SCNC: 9 MMOL/L (ref 3–16)
BUN SERPL-MCNC: 42 MG/DL (ref 7–20)
CALCIUM SERPL-MCNC: 9.2 MG/DL (ref 8.3–10.6)
CHLORIDE SERPL-SCNC: 102 MMOL/L (ref 99–110)
CO2 SERPL-SCNC: 25 MMOL/L (ref 21–32)
CREAT SERPL-MCNC: 1.7 MG/DL (ref 0.8–1.3)
GFR SERPLBLD CREATININE-BSD FMLA CKD-EPI: 39 ML/MIN/{1.73_M2}
GLUCOSE SERPL-MCNC: 145 MG/DL (ref 70–99)
POTASSIUM SERPL-SCNC: 5 MMOL/L (ref 3.5–5.1)
SODIUM SERPL-SCNC: 136 MMOL/L (ref 136–145)

## 2025-04-17 PROCEDURE — 83036 HEMOGLOBIN GLYCOSYLATED A1C: CPT

## 2025-04-17 PROCEDURE — 80048 BASIC METABOLIC PNL TOTAL CA: CPT

## 2025-04-17 PROCEDURE — 36415 COLL VENOUS BLD VENIPUNCTURE: CPT

## 2025-04-18 ENCOUNTER — RESULTS FOLLOW-UP (OUTPATIENT)
Dept: FAMILY MEDICINE CLINIC | Age: 85
End: 2025-04-18

## 2025-04-18 LAB
EST. AVERAGE GLUCOSE BLD GHB EST-MCNC: 185.8 MG/DL
HBA1C MFR BLD: 8.1 %

## 2025-04-28 ENCOUNTER — OFFICE VISIT (OUTPATIENT)
Dept: FAMILY MEDICINE CLINIC | Age: 85
End: 2025-04-28
Payer: MEDICARE

## 2025-04-28 VITALS
TEMPERATURE: 98 F | HEART RATE: 69 BPM | WEIGHT: 161 LBS | OXYGEN SATURATION: 98 % | SYSTOLIC BLOOD PRESSURE: 120 MMHG | BODY MASS INDEX: 23.76 KG/M2 | DIASTOLIC BLOOD PRESSURE: 58 MMHG

## 2025-04-28 DIAGNOSIS — E11.22 TYPE 2 DIABETES MELLITUS WITH STAGE 3B CHRONIC KIDNEY DISEASE, WITH LONG-TERM CURRENT USE OF INSULIN (HCC): ICD-10-CM

## 2025-04-28 DIAGNOSIS — Z79.4 TYPE 2 DIABETES MELLITUS WITH STAGE 3B CHRONIC KIDNEY DISEASE, WITH LONG-TERM CURRENT USE OF INSULIN (HCC): ICD-10-CM

## 2025-04-28 DIAGNOSIS — N18.32 TYPE 2 DIABETES MELLITUS WITH STAGE 3B CHRONIC KIDNEY DISEASE, WITH LONG-TERM CURRENT USE OF INSULIN (HCC): ICD-10-CM

## 2025-04-28 DIAGNOSIS — Z79.4 TYPE 2 DIABETES MELLITUS WITH HYPERGLYCEMIA, WITH LONG-TERM CURRENT USE OF INSULIN (HCC): Primary | ICD-10-CM

## 2025-04-28 DIAGNOSIS — E11.65 TYPE 2 DIABETES MELLITUS WITH HYPERGLYCEMIA, WITH LONG-TERM CURRENT USE OF INSULIN (HCC): Primary | ICD-10-CM

## 2025-04-28 DIAGNOSIS — I10 PRIMARY HYPERTENSION: ICD-10-CM

## 2025-04-28 DIAGNOSIS — R60.0 PERIPHERAL EDEMA: ICD-10-CM

## 2025-04-28 PROCEDURE — 3052F HG A1C>EQUAL 8.0%<EQUAL 9.0%: CPT | Performed by: FAMILY MEDICINE

## 2025-04-28 PROCEDURE — 99214 OFFICE O/P EST MOD 30 MIN: CPT | Performed by: FAMILY MEDICINE

## 2025-04-28 PROCEDURE — 3074F SYST BP LT 130 MM HG: CPT | Performed by: FAMILY MEDICINE

## 2025-04-28 PROCEDURE — 3078F DIAST BP <80 MM HG: CPT | Performed by: FAMILY MEDICINE

## 2025-04-28 PROCEDURE — 1159F MED LIST DOCD IN RCRD: CPT | Performed by: FAMILY MEDICINE

## 2025-04-28 PROCEDURE — G8427 DOCREV CUR MEDS BY ELIG CLIN: HCPCS | Performed by: FAMILY MEDICINE

## 2025-04-28 PROCEDURE — 1123F ACP DISCUSS/DSCN MKR DOCD: CPT | Performed by: FAMILY MEDICINE

## 2025-04-28 PROCEDURE — G8420 CALC BMI NORM PARAMETERS: HCPCS | Performed by: FAMILY MEDICINE

## 2025-04-28 PROCEDURE — 1036F TOBACCO NON-USER: CPT | Performed by: FAMILY MEDICINE

## 2025-04-28 PROCEDURE — G2211 COMPLEX E/M VISIT ADD ON: HCPCS | Performed by: FAMILY MEDICINE

## 2025-04-28 RX ORDER — INSULIN GLARGINE 100 [IU]/ML
18 INJECTION, SOLUTION SUBCUTANEOUS NIGHTLY
Qty: 15 ML | Refills: 3 | Status: SHIPPED | OUTPATIENT
Start: 2025-04-28

## 2025-04-28 NOTE — PROGRESS NOTES
SUBJECTIVE:  84 y.o. male for follow up of diabetes.     HPI:     No acute concerns since last visit 1/27/2025.     Diabetes:   Medication compliance: compliant all of the time ( basal insulin, mealtime insulin, and Jardiance), no SE with meds   Diabetic diet compliance: noncompliant some of the time- doesn't avoid sugar or carbohydrates  Home glucose monitoring: is performed regularly 3-4 times per day, no continuous glucose monitor. Patient's morning glucose readings range from 60's to 130's. Patient reports that Lunch/dinner/PM readings are similar to AM readings, but lost these notes with his tax return documents.    Further diabetic ROS: no polyuria or polydipsia, no chest pain, dyspnea or no numbness, tingling or pain in extremities, last eye exam approximately 1 month ago with no retinopathy.  Patient unaware of any hypoglycemic events despite low AM readings. Patient's chronic lower extremity edema in ankles and feet is intermittent, but has shown mild improvement since last visit. Edema improves with compression socks, which patient wears most days.     Patient does not check blood pressure at home. Taking meds (metoprolol) regularly, no SE.     Occasionally checks pulse ox, reports his numbers are in the high 90's.     Other specialists-  Neuro: Seeing neuro for Parkinson's. No concerns today  Cardiology: Dr. Burciaga is retiring- seeing someone new.   Renal: appointment next month. Renal labs showed slight decline from previous labs  Podiatrist: checked feet last week, no concerns        OBJECTIVE:    Vitals:    04/28/25 0835 04/28/25 0928   BP: (!) 120/58 (!) 120/58   BP Site: Left Upper Arm    Patient Position: Sitting    BP Cuff Size: Medium Adult    Pulse: 69    Temp: 98 °F (36.7 °C)    TempSrc: Infrared    SpO2: 98%    Weight: 73 kg (161 lb)      Wt Readings from Last 3 Encounters:   04/28/25 73 kg (161 lb)   02/27/25 73.9 kg (163 lb)   02/26/25 74.3 kg (163 lb 12.8 oz)     Body mass index is 23.76

## 2025-04-28 NOTE — PATIENT INSTRUCTIONS
--Check your sugars 3-4 times a day and send me readings in 3 weeks.     --check at pharmacy for glucose tablets

## 2025-05-14 ENCOUNTER — HOSPITAL ENCOUNTER (OUTPATIENT)
Age: 85
Discharge: HOME | End: 2025-05-14
Payer: MEDICARE

## 2025-05-14 DIAGNOSIS — N18.31 CHRONIC KIDNEY DISEASE, STAGE 3A (HCC): ICD-10-CM

## 2025-05-14 DIAGNOSIS — I10 ESSENTIAL HYPERTENSION: ICD-10-CM

## 2025-05-14 DIAGNOSIS — E55.9 VITAMIN D DEFICIENCY: ICD-10-CM

## 2025-05-14 DIAGNOSIS — E87.5 HYPERKALEMIA: ICD-10-CM

## 2025-05-14 LAB
25(OH)D3 SERPL-MCNC: 62.4 NG/ML
ALBUMIN SERPL-MCNC: 3.9 G/DL (ref 3.4–5)
ANION GAP SERPL CALCULATED.3IONS-SCNC: 13 MMOL/L (ref 3–16)
BACTERIA URNS QL MICRO: ABNORMAL /HPF
BILIRUB UR QL STRIP.AUTO: NEGATIVE
BUN SERPL-MCNC: 40 MG/DL (ref 7–20)
CALCIUM SERPL-MCNC: 9.5 MG/DL (ref 8.3–10.6)
CHLORIDE SERPL-SCNC: 103 MMOL/L (ref 99–110)
CLARITY UR: CLEAR
CO2 SERPL-SCNC: 22 MMOL/L (ref 21–32)
COLOR UR: YELLOW
CREAT SERPL-MCNC: 1.3 MG/DL (ref 0.8–1.3)
CREAT UR-MCNC: 66.7 MG/DL (ref 39–259)
DEPRECATED RDW RBC AUTO: 15.4 % (ref 12.4–15.4)
EPI CELLS #/AREA URNS AUTO: 1 /HPF (ref 0–5)
GFR SERPLBLD CREATININE-BSD FMLA CKD-EPI: 54 ML/MIN/{1.73_M2}
GLUCOSE SERPL-MCNC: 169 MG/DL (ref 70–99)
GLUCOSE UR STRIP.AUTO-MCNC: >=1000 MG/DL
HCT VFR BLD AUTO: 50.4 % (ref 40.5–52.5)
HGB BLD-MCNC: 16.4 G/DL (ref 13.5–17.5)
HGB UR QL STRIP.AUTO: NEGATIVE
HYALINE CASTS #/AREA URNS AUTO: 3 /LPF (ref 0–8)
KETONES UR STRIP.AUTO-MCNC: NEGATIVE MG/DL
LEUKOCYTE ESTERASE UR QL STRIP.AUTO: NEGATIVE
MCH RBC QN AUTO: 30.4 PG (ref 26–34)
MCHC RBC AUTO-ENTMCNC: 32.6 G/DL (ref 31–36)
MCV RBC AUTO: 93.4 FL (ref 80–100)
NITRITE UR QL STRIP.AUTO: NEGATIVE
PH UR STRIP.AUTO: 5 [PH] (ref 5–8)
PHOSPHATE SERPL-MCNC: 4.2 MG/DL (ref 2.5–4.9)
PLATELET # BLD AUTO: 338 K/UL (ref 135–450)
PMV BLD AUTO: 8.1 FL (ref 5–10.5)
POTASSIUM SERPL-SCNC: 5.3 MMOL/L (ref 3.5–5.1)
PROT UR STRIP.AUTO-MCNC: NEGATIVE MG/DL
PROT UR-MCNC: 16.6 MG/DL
PROT/CREAT UR-RTO: 0.2 MG/DL
PTH-INTACT SERPL-MCNC: 36.3 PG/ML (ref 14–72)
RBC # BLD AUTO: 5.4 M/UL (ref 4.2–5.9)
RBC CLUMPS #/AREA URNS AUTO: 0 /HPF (ref 0–4)
SODIUM SERPL-SCNC: 138 MMOL/L (ref 136–145)
SP GR UR STRIP.AUTO: 1.02 (ref 1–1.03)
UA DIPSTICK W REFLEX MICRO PNL UR: ABNORMAL
URN SPEC COLLECT METH UR: ABNORMAL
UROBILINOGEN UR STRIP-ACNC: 0.2 E.U./DL
WBC # BLD AUTO: 10.8 K/UL (ref 4–11)
WBC #/AREA URNS AUTO: 1 /HPF (ref 0–5)

## 2025-05-14 PROCEDURE — 82306 VITAMIN D 25 HYDROXY: CPT

## 2025-05-14 PROCEDURE — 81001 URINALYSIS AUTO W/SCOPE: CPT

## 2025-05-14 PROCEDURE — 82570 ASSAY OF URINE CREATININE: CPT

## 2025-05-14 PROCEDURE — 84156 ASSAY OF PROTEIN URINE: CPT

## 2025-05-14 PROCEDURE — 36415 COLL VENOUS BLD VENIPUNCTURE: CPT

## 2025-05-14 PROCEDURE — 85027 COMPLETE CBC AUTOMATED: CPT

## 2025-05-14 PROCEDURE — 83970 ASSAY OF PARATHORMONE: CPT

## 2025-05-14 PROCEDURE — 80069 RENAL FUNCTION PANEL: CPT

## 2025-05-21 ENCOUNTER — HOSPITAL ENCOUNTER (OUTPATIENT)
Age: 85
Discharge: HOME | End: 2025-05-21
Payer: MEDICARE

## 2025-05-21 DIAGNOSIS — N18.1 CKD (CHRONIC KIDNEY DISEASE), STAGE I: ICD-10-CM

## 2025-05-21 LAB
ANION GAP SERPL CALCULATED.3IONS-SCNC: 12 MMOL/L (ref 3–16)
BUN SERPL-MCNC: 35 MG/DL (ref 7–20)
CALCIUM SERPL-MCNC: 9.4 MG/DL (ref 8.3–10.6)
CHLORIDE SERPL-SCNC: 105 MMOL/L (ref 99–110)
CO2 SERPL-SCNC: 21 MMOL/L (ref 21–32)
CREAT SERPL-MCNC: 1.3 MG/DL (ref 0.8–1.3)
GFR SERPLBLD CREATININE-BSD FMLA CKD-EPI: 54 ML/MIN/{1.73_M2}
GLUCOSE SERPL-MCNC: 178 MG/DL (ref 70–99)
POTASSIUM SERPL-SCNC: 4.6 MMOL/L (ref 3.5–5.1)
SODIUM SERPL-SCNC: 138 MMOL/L (ref 136–145)

## 2025-05-21 PROCEDURE — 80048 BASIC METABOLIC PNL TOTAL CA: CPT

## 2025-05-21 PROCEDURE — 36415 COLL VENOUS BLD VENIPUNCTURE: CPT

## 2025-06-18 ENCOUNTER — OFFICE VISIT (OUTPATIENT)
Dept: PULMONOLOGY | Age: 85
End: 2025-06-18
Payer: MEDICARE

## 2025-06-18 VITALS
SYSTOLIC BLOOD PRESSURE: 122 MMHG | HEART RATE: 89 BPM | OXYGEN SATURATION: 94 % | WEIGHT: 161 LBS | DIASTOLIC BLOOD PRESSURE: 62 MMHG | BODY MASS INDEX: 23.85 KG/M2 | HEIGHT: 69 IN

## 2025-06-18 DIAGNOSIS — J30.1 NON-SEASONAL ALLERGIC RHINITIS DUE TO POLLEN: ICD-10-CM

## 2025-06-18 DIAGNOSIS — J84.9 INTERSTITIAL LUNG DISEASE (HCC): Primary | ICD-10-CM

## 2025-06-18 DIAGNOSIS — R05.3 CHRONIC COUGH: ICD-10-CM

## 2025-06-18 PROCEDURE — 99214 OFFICE O/P EST MOD 30 MIN: CPT | Performed by: INTERNAL MEDICINE

## 2025-06-18 PROCEDURE — 1036F TOBACCO NON-USER: CPT | Performed by: INTERNAL MEDICINE

## 2025-06-18 PROCEDURE — 3074F SYST BP LT 130 MM HG: CPT | Performed by: INTERNAL MEDICINE

## 2025-06-18 PROCEDURE — G2211 COMPLEX E/M VISIT ADD ON: HCPCS | Performed by: INTERNAL MEDICINE

## 2025-06-18 PROCEDURE — 3078F DIAST BP <80 MM HG: CPT | Performed by: INTERNAL MEDICINE

## 2025-06-18 PROCEDURE — 1159F MED LIST DOCD IN RCRD: CPT | Performed by: INTERNAL MEDICINE

## 2025-06-18 PROCEDURE — G8427 DOCREV CUR MEDS BY ELIG CLIN: HCPCS | Performed by: INTERNAL MEDICINE

## 2025-06-18 PROCEDURE — 1160F RVW MEDS BY RX/DR IN RCRD: CPT | Performed by: INTERNAL MEDICINE

## 2025-06-18 PROCEDURE — 1123F ACP DISCUSS/DSCN MKR DOCD: CPT | Performed by: INTERNAL MEDICINE

## 2025-06-18 PROCEDURE — G8420 CALC BMI NORM PARAMETERS: HCPCS | Performed by: INTERNAL MEDICINE

## 2025-06-18 NOTE — PROGRESS NOTES
PULMONARY OFFICE FOLLOW-UP VISIT    CONSULTING PHYSICIAN:  Madison Tapia MD     REASON FOR VISIT:   Chief Complaint   Patient presents with    6 Month Follow-Up     ILD    Results     Discuss ct results       DATE OF VISIT: 6/18/2025    HISTORY OF PRESENT ILLNESS: 84 y.o. year old male is into the pulmonary office for a follow-up.  Reports stable breathing without any new symptoms.    Has been walking around with the help of a walker.  His Parkinson's is stable.  He has not had any recent falls.  Denies any cough, discolored expectoration, chest pain or chest tightness.  Has been checking his oxygen levels at home and they have never reduced below 90%.  No anorexia or weight loss.    Previously: Reports stable breathing.  Denies any new symptoms.  No persistent cough.  Has been trying to stay active.  Does walk slowly due to his Parkinson disease.  Has not used oxygen while ambulating.  Reports that whenever he is checking his oxygen level at home it is generally 90% and above.  Recently had a lot of leg wounds and has been going to wound center for management.  Does have diabetes. Patient reports that his breathing has been stable.  He has been walking slowly mostly because of his Parkinson disease.  Denies any wheezing, cough, discolored expectoration.  Denies any chest pain or chest tightness.  Frequently checks his oxygen level at home.  Reports that it mostly stays in the 90s. Patient suffers with Parkinson disease for the last 4 to 5 years.  He has been on Sinemet therapy.  While his tremors have been under control, his balance has been off.  In June of this year he had a fall at home and was admitted to [facility].  He had an extensive diagnostic work-up done including a CT chest.  CT chest showed abnormal findings.  At that time patient was having some dry cough which she felt was due to postnasal drip.  Currently denies any respiratory symptoms.  No shortness of breath, no

## 2025-07-23 DIAGNOSIS — Z79.4 TYPE 2 DIABETES MELLITUS WITH HYPERGLYCEMIA, WITH LONG-TERM CURRENT USE OF INSULIN (HCC): ICD-10-CM

## 2025-07-23 DIAGNOSIS — E11.65 TYPE 2 DIABETES MELLITUS WITH HYPERGLYCEMIA, WITH LONG-TERM CURRENT USE OF INSULIN (HCC): ICD-10-CM

## 2025-07-24 LAB
ALBUMIN SERPL-MCNC: 4.2 G/DL (ref 3.4–5)
ALBUMIN/GLOB SERPL: 1.3 {RATIO} (ref 1.1–2.2)
ALP SERPL-CCNC: 73 U/L (ref 40–129)
ALT SERPL-CCNC: 6 U/L (ref 10–40)
ANION GAP SERPL CALCULATED.3IONS-SCNC: 13 MMOL/L (ref 3–16)
AST SERPL-CCNC: 19 U/L (ref 15–37)
BILIRUB SERPL-MCNC: 0.9 MG/DL (ref 0–1)
BUN SERPL-MCNC: 57 MG/DL (ref 7–20)
CALCIUM SERPL-MCNC: 9.3 MG/DL (ref 8.3–10.6)
CHLORIDE SERPL-SCNC: 105 MMOL/L (ref 99–110)
CHOLEST SERPL-MCNC: 123 MG/DL (ref 0–199)
CO2 SERPL-SCNC: 23 MMOL/L (ref 21–32)
CREAT SERPL-MCNC: 1.5 MG/DL (ref 0.8–1.3)
EST. AVERAGE GLUCOSE BLD GHB EST-MCNC: 205.9 MG/DL
GFR SERPLBLD CREATININE-BSD FMLA CKD-EPI: 45 ML/MIN/{1.73_M2}
GLUCOSE SERPL-MCNC: 176 MG/DL (ref 70–99)
HBA1C MFR BLD: 8.8 %
HDLC SERPL-MCNC: 51 MG/DL (ref 40–60)
LDLC SERPL CALC-MCNC: 61 MG/DL
POTASSIUM SERPL-SCNC: 4.5 MMOL/L (ref 3.5–5.1)
PROT SERPL-MCNC: 7.5 G/DL (ref 6.4–8.2)
SODIUM SERPL-SCNC: 141 MMOL/L (ref 136–145)
TRIGL SERPL-MCNC: 57 MG/DL (ref 0–150)
TSH SERPL DL<=0.005 MIU/L-ACNC: 3.21 UIU/ML (ref 0.27–4.2)
VLDLC SERPL CALC-MCNC: 11 MG/DL

## 2025-07-30 ENCOUNTER — OFFICE VISIT (OUTPATIENT)
Dept: FAMILY MEDICINE CLINIC | Age: 85
End: 2025-07-30

## 2025-07-30 VITALS
SYSTOLIC BLOOD PRESSURE: 110 MMHG | BODY MASS INDEX: 23.2 KG/M2 | WEIGHT: 157.2 LBS | OXYGEN SATURATION: 96 % | DIASTOLIC BLOOD PRESSURE: 56 MMHG | HEART RATE: 72 BPM | TEMPERATURE: 98.2 F

## 2025-07-30 DIAGNOSIS — E11.65 TYPE 2 DIABETES MELLITUS WITH HYPERGLYCEMIA, WITH LONG-TERM CURRENT USE OF INSULIN (HCC): Primary | ICD-10-CM

## 2025-07-30 DIAGNOSIS — E11.22 TYPE 2 DIABETES MELLITUS WITH STAGE 3B CHRONIC KIDNEY DISEASE, WITH LONG-TERM CURRENT USE OF INSULIN (HCC): ICD-10-CM

## 2025-07-30 DIAGNOSIS — E03.9 ACQUIRED HYPOTHYROIDISM: ICD-10-CM

## 2025-07-30 DIAGNOSIS — I10 PRIMARY HYPERTENSION: ICD-10-CM

## 2025-07-30 DIAGNOSIS — N18.32 TYPE 2 DIABETES MELLITUS WITH STAGE 3B CHRONIC KIDNEY DISEASE, WITH LONG-TERM CURRENT USE OF INSULIN (HCC): ICD-10-CM

## 2025-07-30 DIAGNOSIS — Z79.4 TYPE 2 DIABETES MELLITUS WITH STAGE 3B CHRONIC KIDNEY DISEASE, WITH LONG-TERM CURRENT USE OF INSULIN (HCC): ICD-10-CM

## 2025-07-30 DIAGNOSIS — Z79.4 TYPE 2 DIABETES MELLITUS WITH HYPERGLYCEMIA, WITH LONG-TERM CURRENT USE OF INSULIN (HCC): Primary | ICD-10-CM

## 2025-07-30 DIAGNOSIS — E78.00 PURE HYPERCHOLESTEROLEMIA: ICD-10-CM

## 2025-07-30 DIAGNOSIS — G20.A1 PARKINSON'S DISEASE, UNSPECIFIED WHETHER DYSKINESIA PRESENT, UNSPECIFIED WHETHER MANIFESTATIONS FLUCTUATE (HCC): ICD-10-CM

## 2025-07-30 RX ORDER — BLOOD-GLUCOSE METER
1 KIT MISCELLANEOUS 3 TIMES DAILY
Qty: 100 EACH | Refills: 5 | Status: SHIPPED | OUTPATIENT
Start: 2025-07-30

## 2025-07-30 NOTE — PROGRESS NOTES
Chief Complaint   Patient presents with    Diabetes     No concerns         History of Present Illness  Here with daughter    Diabetes  - He has been monitoring his blood sugar levels in the morning, which have ranged from the 90s to 170s.  - He does not consistently check his levels throughout the day or before meals, and he is not using a CGM sensor.  - He has no issues with finger pricking for blood sugar testing and has sufficient test strips.  - He administers insulin with meals intermittently  - He is currently on Jardiance as well, no SE    Hypertension  - He occasionally checks his blood pressure at home, which has never been high or low.  - He is on metoprolol, no SE    Parkinson's Disease  - He reports no changes in his Parkinson's disease.  - He is able to button his shirt independently, although it takes some time.  - He is seeking a new neurologist who is more familiar with Parkinson's disease.    Arthritis  - He has recently developed arthritis in his finger joints, causing pain when attempting to button his shirt.  - He is not currently taking any medication for this condition.    Hyperlipidemia  - He is on Crestor for cholesterol management, no SE    He reports good leg swelling and is wearing support stockings. He reports no breathing difficulties, shortness of breath, or cough, except during a recent cold. He has lost about 4 pounds since his last visit but continues to eat well.      Body mass index is 23.2 kg/m².    Vitals:    07/30/25 0834 07/30/25 0857   BP: (!) 123/59 (!) 110/56   Pulse: 72    Temp: 98.2 °F (36.8 °C)    TempSrc: Infrared    SpO2: 96%    Weight: 71.3 kg (157 lb 3.2 oz)      Wt Readings from Last 3 Encounters:   07/30/25 71.3 kg (157 lb 3.2 oz)   06/18/25 73 kg (161 lb)   05/23/25 72.6 kg (160 lb)         1/27/2025     8:55 AM 10/25/2024     8:01 AM 2/16/2024    10:52 AM 6/1/2023    11:05 AM 9/29/2022     7:27 AM 3/28/2022     7:44 AM 3/1/2021    10:30 AM   PHQ Scores   PHQ2

## 2025-08-01 RX ORDER — BLOOD-GLUCOSE METER
KIT MISCELLANEOUS
Qty: 100 STRIP | OUTPATIENT
Start: 2025-08-01

## 2025-08-01 NOTE — TELEPHONE ENCOUNTER
Medication:   Requested Prescriptions     Pending Prescriptions Disp Refills    FREESTYLE LITE strip [Pharmacy Med Name: FREESTYLE LITE TEST STRIP] 100 strip      Sig: TEST THREE TIMES A DAY       Patient Phone Number: [phone] (home) [phone] (work)    Last appt: 7/30/2025   Next appt: 10/30/2025    Last OARRS:        No data to display              PDMP Monitoring:    Last PDMP Guzman as Reviewed ([place]):  Review User Review Instant Review Result          Preferred Pharmacy:   [facility] [id number] - [place] - [address] [phone] F [phone]  [address] [place] [address]  Phone: [phone] Fax: [phone]

## 2025-09-03 DIAGNOSIS — G20.B1 PARKINSON'S DISEASE WITH DYSKINESIA WITHOUT FLUCTUATING MANIFESTATIONS (HCC): ICD-10-CM

## 2025-09-05 RX ORDER — CARBIDOPA AND LEVODOPA 25; 100 MG/1; MG/1
TABLET ORAL
Qty: 225 TABLET | Refills: 1 | Status: SHIPPED | OUTPATIENT
Start: 2025-09-05